# Patient Record
Sex: MALE | Race: WHITE | Employment: OTHER | URBAN - NONMETROPOLITAN AREA
[De-identification: names, ages, dates, MRNs, and addresses within clinical notes are randomized per-mention and may not be internally consistent; named-entity substitution may affect disease eponyms.]

---

## 2017-04-18 ENCOUNTER — OFFICE VISIT (OUTPATIENT)
Dept: FAMILY MEDICINE CLINIC | Age: 82
End: 2017-04-18

## 2017-04-18 VITALS
TEMPERATURE: 98 F | SYSTOLIC BLOOD PRESSURE: 105 MMHG | BODY MASS INDEX: 31.98 KG/M2 | OXYGEN SATURATION: 96 % | WEIGHT: 199 LBS | HEART RATE: 66 BPM | HEIGHT: 66 IN | DIASTOLIC BLOOD PRESSURE: 68 MMHG

## 2017-04-18 DIAGNOSIS — D22.9 SUSPICIOUS NEVUS: ICD-10-CM

## 2017-04-18 DIAGNOSIS — R05.9 COUGH: ICD-10-CM

## 2017-04-18 DIAGNOSIS — J06.9 URI, ACUTE: Primary | ICD-10-CM

## 2017-04-18 PROCEDURE — 99214 OFFICE O/P EST MOD 30 MIN: CPT | Performed by: NURSE PRACTITIONER

## 2017-04-18 RX ORDER — AZITHROMYCIN 250 MG/1
TABLET, FILM COATED ORAL
Qty: 1 PACKET | Refills: 0 | Status: SHIPPED | OUTPATIENT
Start: 2017-04-18 | End: 2017-08-10 | Stop reason: ALTCHOICE

## 2017-04-18 RX ORDER — BENZONATATE 100 MG/1
100 CAPSULE ORAL 3 TIMES DAILY PRN
Qty: 30 CAPSULE | Refills: 0 | Status: SHIPPED | OUTPATIENT
Start: 2017-04-18 | End: 2017-04-25

## 2017-04-18 ASSESSMENT — ENCOUNTER SYMPTOMS
GASTROINTESTINAL NEGATIVE: 1
SHORTNESS OF BREATH: 0
EYES NEGATIVE: 1
SORE THROAT: 1
COUGH: 1

## 2017-08-07 ENCOUNTER — OFFICE VISIT (OUTPATIENT)
Dept: SURGERY | Age: 82
End: 2017-08-07

## 2017-08-07 VITALS
HEIGHT: 65 IN | DIASTOLIC BLOOD PRESSURE: 70 MMHG | WEIGHT: 206 LBS | BODY MASS INDEX: 34.32 KG/M2 | SYSTOLIC BLOOD PRESSURE: 122 MMHG

## 2017-08-07 DIAGNOSIS — D48.5 NEOPLASM OF UNCERTAIN BEHAVIOR OF SKIN: Primary | ICD-10-CM

## 2017-08-07 PROCEDURE — 99213 OFFICE O/P EST LOW 20 MIN: CPT | Performed by: SURGERY

## 2017-08-09 ENCOUNTER — TELEPHONE (OUTPATIENT)
Dept: SURGERY | Age: 82
End: 2017-08-09

## 2017-08-14 ENCOUNTER — HOSPITAL ENCOUNTER (OUTPATIENT)
Dept: SURGERY | Age: 82
Discharge: HOME | End: 2017-08-14
Attending: SURGERY | Admitting: SURGERY

## 2017-08-15 ENCOUNTER — HOSPITAL ENCOUNTER (OUTPATIENT)
Dept: SURGERY | Age: 82
Discharge: HOME | End: 2017-08-15
Admitting: SURGERY

## 2017-08-15 VITALS
DIASTOLIC BLOOD PRESSURE: 69 MMHG | HEART RATE: 60 BPM | TEMPERATURE: 98.8 F | BODY MASS INDEX: 34.32 KG/M2 | WEIGHT: 206 LBS | HEIGHT: 65 IN | OXYGEN SATURATION: 96 % | SYSTOLIC BLOOD PRESSURE: 103 MMHG | RESPIRATION RATE: 16 BRPM

## 2017-08-15 PROCEDURE — 11441 EXC FACE-MM B9+MARG 0.6-1 CM: CPT | Performed by: SURGERY

## 2017-08-15 PROCEDURE — 12052 INTMD RPR FACE/MM 2.6-5.0 CM: CPT | Performed by: SURGERY

## 2017-08-15 ASSESSMENT — PAIN - FUNCTIONAL ASSESSMENT: PAIN_FUNCTIONAL_ASSESSMENT: 0-10

## 2017-08-15 ASSESSMENT — PAIN SCALES - GENERAL
PAINLEVEL_OUTOF10: 0
PAINLEVEL_OUTOF10: 0

## 2017-08-28 ENCOUNTER — TELEPHONE (OUTPATIENT)
Dept: SURGERY | Age: 82
End: 2017-08-28

## 2017-08-28 ENCOUNTER — OFFICE VISIT (OUTPATIENT)
Dept: SURGERY | Age: 82
End: 2017-08-28

## 2017-08-28 VITALS
SYSTOLIC BLOOD PRESSURE: 120 MMHG | DIASTOLIC BLOOD PRESSURE: 80 MMHG | BODY MASS INDEX: 33.99 KG/M2 | WEIGHT: 204 LBS | HEIGHT: 65 IN

## 2017-08-28 DIAGNOSIS — Z09 SURGERY FOLLOW-UP EXAMINATION: Primary | ICD-10-CM

## 2017-08-28 PROCEDURE — 99024 POSTOP FOLLOW-UP VISIT: CPT | Performed by: SURGERY

## 2017-12-18 ENCOUNTER — TELEPHONE (OUTPATIENT)
Dept: FAMILY MEDICINE CLINIC | Age: 82
End: 2017-12-18

## 2017-12-18 NOTE — TELEPHONE ENCOUNTER
Pt called in regards to seeing Dr Stephan Blakely. Patient has a throat infection and can't get over illness. Wife is asking for a call back.

## 2017-12-19 ENCOUNTER — OFFICE VISIT (OUTPATIENT)
Dept: FAMILY MEDICINE CLINIC | Age: 82
End: 2017-12-19

## 2017-12-19 VITALS
SYSTOLIC BLOOD PRESSURE: 100 MMHG | WEIGHT: 203.6 LBS | DIASTOLIC BLOOD PRESSURE: 60 MMHG | OXYGEN SATURATION: 96 % | BODY MASS INDEX: 33.51 KG/M2 | HEART RATE: 72 BPM

## 2017-12-19 DIAGNOSIS — R05.9 COUGH: Primary | ICD-10-CM

## 2017-12-19 DIAGNOSIS — R06.2 WHEEZE: ICD-10-CM

## 2017-12-19 DIAGNOSIS — J40 BRONCHITIS: ICD-10-CM

## 2017-12-19 DIAGNOSIS — J06.9 ACUTE UPPER RESPIRATORY INFECTION: ICD-10-CM

## 2017-12-19 DIAGNOSIS — J02.9 SORE THROAT: ICD-10-CM

## 2017-12-19 LAB — S PYO AG THROAT QL: NORMAL

## 2017-12-19 PROCEDURE — 99214 OFFICE O/P EST MOD 30 MIN: CPT | Performed by: NURSE PRACTITIONER

## 2017-12-19 PROCEDURE — 94640 AIRWAY INHALATION TREATMENT: CPT | Performed by: NURSE PRACTITIONER

## 2017-12-19 PROCEDURE — 87880 STREP A ASSAY W/OPTIC: CPT | Performed by: NURSE PRACTITIONER

## 2017-12-19 RX ORDER — PREDNISONE 10 MG/1
TABLET ORAL
Qty: 30 TABLET | Refills: 0 | Status: SHIPPED | OUTPATIENT
Start: 2017-12-19 | End: 2018-03-16 | Stop reason: ALTCHOICE

## 2017-12-19 RX ORDER — DOXYCYCLINE HYCLATE 100 MG
100 TABLET ORAL 2 TIMES DAILY
Qty: 20 TABLET | Refills: 0 | Status: SHIPPED | OUTPATIENT
Start: 2017-12-19 | End: 2017-12-29

## 2017-12-19 RX ORDER — BENZONATATE 100 MG/1
100 CAPSULE ORAL 3 TIMES DAILY PRN
Qty: 30 CAPSULE | Refills: 0 | Status: SHIPPED | OUTPATIENT
Start: 2017-12-19 | End: 2018-03-16 | Stop reason: ALTCHOICE

## 2017-12-19 RX ORDER — ALBUTEROL SULFATE 2.5 MG/3ML
2.5 SOLUTION RESPIRATORY (INHALATION) ONCE
Status: COMPLETED | OUTPATIENT
Start: 2017-12-19 | End: 2017-12-19

## 2017-12-19 RX ADMIN — ALBUTEROL SULFATE 2.5 MG: 2.5 SOLUTION RESPIRATORY (INHALATION) at 10:04

## 2017-12-19 RX ADMIN — Medication 0.5 MG: at 10:04

## 2017-12-19 ASSESSMENT — ENCOUNTER SYMPTOMS
RESPIRATORY NEGATIVE: 1
GASTROINTESTINAL NEGATIVE: 1
SORE THROAT: 1
VOICE CHANGE: 1
RHINORRHEA: 1

## 2017-12-19 NOTE — PATIENT INSTRUCTIONS
Patient Education        Cough: Care Instructions  Your Care Instructions    A cough is your body's response to something that bothers your throat or airways. Many things can cause a cough. You might cough because of a cold or the flu, bronchitis, or asthma. Smoking, postnasal drip, allergies, and stomach acid that backs up into your throat also can cause coughs. A cough is a symptom, not a disease. Most coughs stop when the cause, such as a cold, goes away. You can take a few steps at home to cough less and feel better. Follow-up care is a key part of your treatment and safety. Be sure to make and go to all appointments, and call your doctor if you are having problems. It's also a good idea to know your test results and keep a list of the medicines you take. How can you care for yourself at home? · Drink lots of water and other fluids. This helps thin the mucus and soothes a dry or sore throat. Honey or lemon juice in hot water or tea may ease a dry cough. · Take cough medicine as directed by your doctor. · Prop up your head on pillows to help you breathe and ease a dry cough. · Try cough drops to soothe a dry or sore throat. Cough drops don't stop a cough. Medicine-flavored cough drops are no better than candy-flavored drops or hard candy. · Do not smoke. Avoid secondhand smoke. If you need help quitting, talk to your doctor about stop-smoking programs and medicines. These can increase your chances of quitting for good. When should you call for help? Call 911 anytime you think you may need emergency care. For example, call if:  ? · You have severe trouble breathing. ?Call your doctor now or seek immediate medical care if:  ? · You cough up blood. ? · You have new or worse trouble breathing. ? · You have a new or higher fever. ? · You have a new rash. ? Watch closely for changes in your health, and be sure to contact your doctor if:  ? · You cough more deeply or more often, especially if you all instructions on the label. · Do not take two or more pain medicines at the same time unless the doctor told you to. Many pain medicines have acetaminophen, which is Tylenol. Too much acetaminophen (Tylenol) can be harmful. · Take an over-the-counter cough medicine that contains dextromethorphan to help quiet a dry, hacking cough so that you can sleep. Avoid cough medicines that have more than one active ingredient. Read and follow all instructions on the label. · Breathe moist air from a humidifier, hot shower, or sink filled with hot water. The heat and moisture will thin mucus so you can cough it out. · Do not smoke. Smoking can make bronchitis worse. If you need help quitting, talk to your doctor about stop-smoking programs and medicines. These can increase your chances of quitting for good. When should you call for help? Call 911 anytime you think you may need emergency care. For example, call if:  ? · You have severe trouble breathing. ?Call your doctor now or seek immediate medical care if:  ? · You have new or worse trouble breathing. ? · You cough up dark brown or bloody mucus (sputum). ? · You have a new or higher fever. ? · You have a new rash. ? Watch closely for changes in your health, and be sure to contact your doctor if:  ? · You cough more deeply or more often, especially if you notice more mucus or a change in the color of your mucus. ? · You are not getting better as expected. Where can you learn more? Go to https://AkohavirginieJetpac.EC[employer]-GEN Energy. org and sign in to your [employer] account. Enter H333 in the [facility] box to learn more about \"Bronchitis: Care Instructions. \"     If you do not have an account, please click on the \"Sign Up Now\" link. Current as of: May 12, 2017  Content Version: 11.4  © 8674-8659 Healthwise, Incorporated. Care instructions adapted under license by [facility] [facility] ([facility]).  If you have questions about a medical condition or this instruction, always ask your healthcare professional. [place] [address] any warranty or liability for your use of this information.

## 2017-12-19 NOTE — PROGRESS NOTES
[address] [facility] PHYSICIAN PRACTICES  [place] [address]  Dept: [phone]  Dept Fax: [phone]    Sommer Mcrcay is a 80 y.o. male who presents today for his medical conditions/complaints as noted below. Sommer Mccray is c/o of Pharyngitis (Stated about a week ago, no trouble swollowing); Chest Congestion (Started a week ago yesterday and has tried to take Mucinex, no fever); and Cough (that does not seem to be productive)        HPI:     Chief Complaint   Patient presents with    Pharyngitis     Stated about a week ago, no trouble swollowing    Chest Congestion     Started a week ago yesterday and has tried to take Mucinex, no fever    Cough     that does not seem to be productive       HPI    Upper Respiratory Infection  Patient complains of symptoms of a URI. Symptoms include bilateral ear pain, congestion, cough and sore throat. Onset of symptoms was 9 days ago, gradually worsening since that time. He also c/o congestion, cough described as non-productive, without wheezing, dyspnea or hemoptysis, nasal congestion, non productive cough and post nasal drip for the past 6 days . He is drinking moderate amounts of fluids. Evaluation to date: none. Treatment to date: Mucinex, which has been  not very effective.     Past Medical History:   Diagnosis Date    Arthritis     Bradycardia     OA (osteoarthritis)     PE (pulmonary embolism) 1991    Slow heart rate 2011    had pm    Wears glasses       Past Surgical History:   Procedure Laterality Date    CARDIAC SURGERY  09/20/2011    pacemaker, defib    CARPAL TUNNEL RELEASE  1/10/12    Right    CYST REMOVAL      OFF BACK    EYE SURGERY      bilateral cataracts    OTHER SURGICAL HISTORY Left 12/20/2016    excision of skin cancer of hand    OTHER SURGICAL HISTORY Left 08/15/2017    excision of lesions times 2, left ear    SKIN BIOPSY  5/17/2012    excision lesion behind the right ear       Family History field and the left lower field. He has no rales. He exhibits no tenderness. Abdominal: Soft. Bowel sounds are normal. He exhibits no distension and no mass. There is no tenderness. There is no rebound and no guarding. Musculoskeletal: Normal range of motion. He exhibits no edema, tenderness or deformity. Lymphadenopathy:     He has no cervical adenopathy. Neurological: He is alert and oriented to person, place, and time. Skin: Skin is warm and dry. No rash noted. He is not diaphoretic. Psychiatric: He has a normal mood and affect. His behavior is normal. Judgment and thought content normal.       Orders Only on 05/11/2015   Component Date Value Ref Range Status    Sodium 05/11/2015 142  136 - 145 mmol/L Final    Potassium 05/11/2015 5.1  3.5 - 5.1 mmol/L Final    Chloride 05/11/2015 104  99 - 110 mmol/L Final    CO2 05/11/2015 23  21 - 32 mmol/L Final    Anion Gap 05/11/2015 15  3 - 16 Final    Glucose 05/11/2015 111* 70 - 99 mg/dL Final    BUN 05/11/2015 18  7 - 20 mg/dL Final    CREATININE 05/11/2015 1.0  0.8 - 1.3 mg/dL Final    GFR Non- 05/11/2015 >60  >60 Final    Comment: >60 mL/min/1.73m2 EGFR, calc. for ages 25 and older using theMDRD formula (not corrected for   weight), is valid for stablerenal function.  GFR  05/11/2015 >60  >60 Final    Comment: Chronic Kidney Disease: less than 60 ml/min/1.73 sq. m. Kidney Failure: less than 15   ml/min/1.73 sq. m. Results valid for patients 18 years and older.  Calcium 05/11/2015 9.3  8.3 - 10.6 mg/dL Final           Assessment & Plan: The following diagnoses and conditions are stable with no further orders unless indicated:  1. Cough    2. Sore throat    3. Acute upper respiratory infection    4. Bronchitis    5. Wheeze        Annekrissy Ace was seen today for pharyngitis, chest congestion and cough. Will place Mr. Ma on doxycycline and prednisone for URI and bronchitis. Strep negative.   Tessalon peales

## 2018-01-26 ENCOUNTER — OFFICE VISIT (OUTPATIENT)
Dept: FAMILY MEDICINE CLINIC | Age: 83
End: 2018-01-26

## 2018-01-26 VITALS
BODY MASS INDEX: 33.88 KG/M2 | SYSTOLIC BLOOD PRESSURE: 118 MMHG | OXYGEN SATURATION: 95 % | HEART RATE: 74 BPM | WEIGHT: 205.8 LBS | DIASTOLIC BLOOD PRESSURE: 70 MMHG

## 2018-01-26 DIAGNOSIS — J40 BRONCHITIS: ICD-10-CM

## 2018-01-26 DIAGNOSIS — R06.2 WHEEZING: ICD-10-CM

## 2018-01-26 DIAGNOSIS — R68.89 FLU-LIKE SYMPTOMS: ICD-10-CM

## 2018-01-26 DIAGNOSIS — R06.02 SHORTNESS OF BREATH: Primary | ICD-10-CM

## 2018-01-26 LAB
INFLUENZA A ANTIBODY: NEGATIVE
INFLUENZA B ANTIBODY: NEGATIVE

## 2018-01-26 PROCEDURE — 87804 INFLUENZA ASSAY W/OPTIC: CPT | Performed by: NURSE PRACTITIONER

## 2018-01-26 PROCEDURE — 99213 OFFICE O/P EST LOW 20 MIN: CPT | Performed by: NURSE PRACTITIONER

## 2018-01-26 PROCEDURE — 94640 AIRWAY INHALATION TREATMENT: CPT | Performed by: NURSE PRACTITIONER

## 2018-01-26 RX ORDER — METHYLPREDNISOLONE 4 MG/1
TABLET ORAL
Qty: 1 KIT | Refills: 0 | Status: SHIPPED | OUTPATIENT
Start: 2018-01-26 | End: 2018-02-01

## 2018-01-26 RX ORDER — ALBUTEROL SULFATE 90 UG/1
2 AEROSOL, METERED RESPIRATORY (INHALATION) EVERY 6 HOURS PRN
Qty: 1 INHALER | Refills: 0 | Status: SHIPPED | OUTPATIENT
Start: 2018-01-26

## 2018-01-26 RX ORDER — ALBUTEROL SULFATE 2.5 MG/3ML
2.5 SOLUTION RESPIRATORY (INHALATION) ONCE
Status: COMPLETED | OUTPATIENT
Start: 2018-01-26 | End: 2018-01-26

## 2018-01-26 RX ORDER — DOXYCYCLINE HYCLATE 100 MG/1
100 CAPSULE ORAL 2 TIMES DAILY
Qty: 20 CAPSULE | Refills: 0 | Status: SHIPPED | OUTPATIENT
Start: 2018-01-26 | End: 2018-02-05

## 2018-01-26 RX ORDER — BUDESONIDE 0.5 MG/2ML
0.5 INHALANT ORAL ONCE
Status: COMPLETED | OUTPATIENT
Start: 2018-01-26 | End: 2018-01-26

## 2018-01-26 RX ADMIN — ALBUTEROL SULFATE 2.5 MG: 2.5 SOLUTION RESPIRATORY (INHALATION) at 12:01

## 2018-01-26 RX ADMIN — BUDESONIDE 500 MCG: 0.5 INHALANT ORAL at 12:01

## 2018-01-26 ASSESSMENT — ENCOUNTER SYMPTOMS
ABDOMINAL PAIN: 0
SHORTNESS OF BREATH: 1
SORE THROAT: 0
RHINORRHEA: 1
NAUSEA: 0
GASTROINTESTINAL NEGATIVE: 1
DIARRHEA: 0
WHEEZING: 1
SPUTUM PRODUCTION: 0
COUGH: 1
SWOLLEN GLANDS: 0
VOMITING: 0
EYES NEGATIVE: 1
SINUS PAIN: 0
HEMOPTYSIS: 0

## 2018-01-26 NOTE — PATIENT INSTRUCTIONS
Patient Education        Cough: Care Instructions  Your Care Instructions    A cough is your body's response to something that bothers your throat or airways. Many things can cause a cough. You might cough because of a cold or the flu, bronchitis, or asthma. Smoking, postnasal drip, allergies, and stomach acid that backs up into your throat also can cause coughs. A cough is a symptom, not a disease. Most coughs stop when the cause, such as a cold, goes away. You can take a few steps at home to cough less and feel better. Follow-up care is a key part of your treatment and safety. Be sure to make and go to all appointments, and call your doctor if you are having problems. It's also a good idea to know your test results and keep a list of the medicines you take. How can you care for yourself at home? · Drink lots of water and other fluids. This helps thin the mucus and soothes a dry or sore throat. Honey or lemon juice in hot water or tea may ease a dry cough. · Take cough medicine as directed by your doctor. · Prop up your head on pillows to help you breathe and ease a dry cough. · Try cough drops to soothe a dry or sore throat. Cough drops don't stop a cough. Medicine-flavored cough drops are no better than candy-flavored drops or hard candy. · Do not smoke. Avoid secondhand smoke. If you need help quitting, talk to your doctor about stop-smoking programs and medicines. These can increase your chances of quitting for good. When should you call for help? Call 911 anytime you think you may need emergency care. For example, call if:  ? · You have severe trouble breathing. ?Call your doctor now or seek immediate medical care if:  ? · You cough up blood. ? · You have new or worse trouble breathing. ? · You have a new or higher fever. ? · You have a new rash. ? Watch closely for changes in your health, and be sure to contact your doctor if:  ? · You cough more deeply or more often, especially if you notice more mucus or a change in the color of your mucus. ? · You have new symptoms, such as a sore throat, an earache, or sinus pain. ? · You do not get better as expected. Where can you learn more? Go to https://chpeloboeweb.[employer].. org and sign in to your [employer] account. Enter D279 in the [employer] box to learn more about \"Cough: Care Instructions. \"     If you do not have an account, please click on the \"Sign Up Now\" link. Current as of: May 12, 2017  Content Version: 11.5  © 0359-7574 Healthwise, Incorporated. Care instructions adapted under license by [facility] ([facility]). If you have questions about a medical condition or this instruction, always ask your healthcare professional. [address] any warranty or liability for your use of this information.

## 2018-01-26 NOTE — PROGRESS NOTES
Cancer Father      Past Surgical History:   Procedure Laterality Date    CARDIAC SURGERY  09/20/2011    pacemaker, defib    CARPAL TUNNEL RELEASE  1/10/12    Right    CYST REMOVAL      OFF BACK    EYE SURGERY      bilateral cataracts    OTHER SURGICAL HISTORY Left 12/20/2016    excision of skin cancer of hand    OTHER SURGICAL HISTORY Left 08/15/2017    excision of lesions times 2, left ear    SKIN BIOPSY  5/17/2012    excision lesion behind the right ear     Social History     Social History    Marital status:      Spouse name: N/A    Number of children: N/A    Years of education: N/A     Occupational History    Not on file. Social History Main Topics    Smoking status: Never Smoker    Smokeless tobacco: Never Used    Alcohol use No    Drug use: No    Sexual activity: Not on file     Other Topics Concern    Not on file     Social History Narrative    No narrative on file     Current Outpatient Prescriptions   Medication Sig Dispense Refill    predniSONE (DELTASONE) 10 MG tablet Take 40 mg for 3 days then 30 mg for 3 days then 20 mg for 3 days then 10 mg for 3 days 30 tablet 0    benzonatate (TESSALON PERLES) 100 MG capsule Take 1 capsule by mouth 3 times daily as needed for Cough 30 capsule 0    diphenhydrAMINE-APAP, sleep, (TYLENOL PM EXTRA STRENGTH)  MG tablet Take 1 tablet by mouth nightly as needed for Sleep      spironolactone (ALDACTONE) 25 MG tablet Take 25 mg by mouth daily      carvedilol (COREG) 12.5 MG tablet Take 12.5 mg by mouth 2 times daily (with meals)      losartan (COZAAR) 25 MG tablet Take 25 mg by mouth daily      aspirin 81 MG tablet Take 81 mg by mouth daily.  therapeutic multivitamin-minerals (THERAGRAN-M) tablet Take 1 tablet by mouth daily. No current facility-administered medications for this visit. Objective:       Physical Exam   Constitutional: He is oriented to person, place, and time.  Vital signs are normal. He appears well-developed and well-nourished. He is cooperative. Non-toxic appearance. He does not have a sickly appearance. No distress. HENT:   Head: Normocephalic and atraumatic. Right Ear: Hearing, tympanic membrane and ear canal normal.   Left Ear: Hearing, tympanic membrane and ear canal normal.   Nose: Nose normal.   Mouth/Throat: Uvula is midline, oropharynx is clear and moist and mucous membranes are normal.   Eyes: Conjunctivae and lids are normal.   Neck: Neck supple. Cardiovascular: Normal rate, regular rhythm, normal heart sounds and normal pulses. Pulmonary/Chest: Effort normal. No accessory muscle usage. No respiratory distress. He has wheezes. Abdominal: Soft. Normal appearance. There is no tenderness. Lymphadenopathy:        Head (right side): No submental and no submandibular adenopathy present. Head (left side): No submental and no submandibular adenopathy present. He has no cervical adenopathy. Neurological: He is alert and oriented to person, place, and time. Skin: Skin is warm and dry. No lesion and no rash noted. Psychiatric: He has a normal mood and affect. His speech is normal and behavior is normal. Cognition and memory are normal.       /70 (Site: Left Arm, Position: Sitting, Cuff Size: Large Adult)   Pulse 74   Wt 205 lb 12.8 oz (93.4 kg)   SpO2 95%   BMI 33.88 kg/m²   Body mass index is 33.88 kg/m². BP Readings from Last 2 Encounters:   01/26/18 118/70   12/19/17 100/60       Wt Readings from Last 3 Encounters:   01/26/18 205 lb 12.8 oz (93.4 kg)   12/19/17 203 lb 9.6 oz (92.4 kg)   08/28/17 204 lb (92.5 kg)       Lab Review   Office Visit on 12/19/2017   Component Date Value    Strep A Ag 12/19/2017 None Detected        No results found for this visit on 01/26/18. Assessment:       1. Shortness of breath    2. Wheezing    3. Bronchitis    4.  Flu-like symptoms        Results for POC orders placed in visit on 01/26/18   POCT Influenza A/B   Result Value Ref Range    Influenza A Ab negative     Influenza B Ab negative             Plan:       Jerome Hernandez was seen today for cough and chest congestion. Diagnoses and all orders for this visit:    Shortness of breath  -     albuterol (PROVENTIL) nebulizer solution 2.5 mg; Take 3 mLs by nebulization once  -     budesonide (PULMICORT) nebulizer suspension 500 mcg; Take 2 mLs by nebulization once  -     AK PRESSURIZED/NONPRESSURIZED INHALATION TREATMENT  -     albuterol sulfate HFA (VENTOLIN HFA) 108 (90 Base) MCG/ACT inhaler; Inhale 2 puffs into the lungs every 6 hours as needed for Wheezing  Nebulizer Treatment:   Nebulizer treatment given in office today using medications noted above. Breathing treatment performed for decreased air flow and/or mild diffuse wheezes. Patient tolerated procedure well. Lung auscultation post treatment revealed improvement in air flow, diminished or resolved wheezes, and subjective improvement. Patients pulse ox before treatment 95 %  Patients pulse ox after treatment 97 %     Wheezing  -     albuterol (PROVENTIL) nebulizer solution 2.5 mg; Take 3 mLs by nebulization once  -     budesonide (PULMICORT) nebulizer suspension 500 mcg; Take 2 mLs by nebulization once  -     AK PRESSURIZED/NONPRESSURIZED INHALATION TREATMENT  -     methylPREDNISolone (MEDROL, ARLYN,) 4 MG tablet; Take as directed for 6 days. -     albuterol sulfate HFA (VENTOLIN HFA) 108 (90 Base) MCG/ACT inhaler; Inhale 2 puffs into the lungs every 6 hours as needed for Wheezing    Bronchitis  -     doxycycline hyclate (VIBRAMYCIN) 100 MG capsule; Take 1 capsule by mouth 2 times daily for 10 days    Flu-like symptoms  -     POCT Influenza A/B          Patient has been instructed call the office immediately with new symptoms, change in symptoms or worsening of symptoms. If this is not feasible, patient is instructed to report to the emergency room. Medication profile reviewed.  Medication side effects and possible impairments from medications were discussed as applicable. Allergies were reviewed. Health maintenance was reviewed and updated as appropriate.

## 2018-03-16 ENCOUNTER — OFFICE VISIT (OUTPATIENT)
Dept: FAMILY MEDICINE CLINIC | Age: 83
End: 2018-03-16

## 2018-03-16 VITALS
DIASTOLIC BLOOD PRESSURE: 72 MMHG | OXYGEN SATURATION: 96 % | TEMPERATURE: 97.7 F | HEIGHT: 65 IN | WEIGHT: 207.6 LBS | SYSTOLIC BLOOD PRESSURE: 116 MMHG | HEART RATE: 66 BPM | BODY MASS INDEX: 34.59 KG/M2

## 2018-03-16 DIAGNOSIS — K12.0 CANKER SORES ORAL: Primary | ICD-10-CM

## 2018-03-16 PROCEDURE — 99213 OFFICE O/P EST LOW 20 MIN: CPT | Performed by: NURSE PRACTITIONER

## 2018-03-16 ASSESSMENT — PATIENT HEALTH QUESTIONNAIRE - PHQ9
2. FEELING DOWN, DEPRESSED OR HOPELESS: 0
SUM OF ALL RESPONSES TO PHQ QUESTIONS 1-9: 0
1. LITTLE INTEREST OR PLEASURE IN DOING THINGS: 0
SUM OF ALL RESPONSES TO PHQ9 QUESTIONS 1 & 2: 0

## 2018-03-16 NOTE — PATIENT INSTRUCTIONS
citrus fruits, nuts, seeds, and tomatoes. · To soothe your canker sore and help it heal:  ¨ Use an over-the-counter numbing medicine, such as Orabase or Anbesol. ¨ Dab a bit of Milk of Magnesia on the canker sore 3 or 4 times a day. · Put ice on your sore to reduce the pain. · Take anti-inflammatory medicines to reduce pain, as needed. These include ibuprofen (Advil, Motrin) and naproxen (Aleve). Read and follow all instructions on the label. · Use a soft-bristle toothbrush, and brush your teeth well but carefully. · Do not smoke or use spit tobacco. Tobacco can cause mouth problems and slow healing. If you need help quitting, talk to your doctor about stop-smoking programs and medicines. These can increase your chances of quitting for good. When should you call for help? Call your doctor now or seek immediate medical care if:  ? · You have signs of infection, such as:  ¨ Increased pain, swelling, warmth, or redness. ¨ Red streaks leading from the area. ¨ Pus draining from the area. ¨ A fever. ? Watch closely for changes in your health, and be sure to contact your doctor if:  ? · You do not get better as expected. Where can you learn more? Go to https://GigaBryte.Guavas. org and sign in to your [employer] account. Enter C962 in the [facility] box to learn more about \"Canker Sore: Care Instructions. \"     If you do not have an account, please click on the \"Sign Up Now\" link. Current as of: May 12, 2017  Content Version: 11.5  © 8882-6812 Healthwise, [email or web address]. Care instructions adapted under license by [facility] ([facility]). If you have questions about a medical condition or this instruction, always ask your healthcare professional. [address] any warranty or liability for your use of this information.

## 2018-03-19 ASSESSMENT — ENCOUNTER SYMPTOMS
SINUS PAIN: 0
VOICE CHANGE: 0
RESPIRATORY NEGATIVE: 1
TROUBLE SWALLOWING: 0
SINUS PRESSURE: 0
SORE THROAT: 0
RHINORRHEA: 0
FACIAL SWELLING: 0

## 2018-03-19 NOTE — PROGRESS NOTES
[facility]YSICIAN PRACTICES  [facility] FAMILY [place] [address]  Dept: [phone]  Dept Fax: [phone]    Abel Clement is a 80 y.o. male who presents today for his medical conditions/complaints as noted below. Abel Clement is c/o of Mouth Lesions (He went to dentist 1 week ago for dental filling. He has felt a rough spot where the numbing medication was at. It is sore and his lower lip is swollen. )        HPI:     Chief Complaint   Patient presents with    Mouth Lesions     He went to dentist 1 week ago for dental filling. He has felt a rough spot where the numbing medication was at. It is sore and his lower lip is swollen. HPI    Patient presents to the office today for a mouth sore that he has had for about one week. He states he went to the dentist for a dental filling one week ago and noticed the same day that he developed a sore the bottom of his left leg. This sore is at the right lower side of his lip and is causing pain, swelling, tenderness. He states that the sore was improving over the last week and now it has not improved over the last 2 days. He has not used anything to help with the pain.     Past Medical History:   Diagnosis Date    Arthritis     Bradycardia     OA (osteoarthritis)     PE (pulmonary embolism) 1991    Slow heart rate 2011    had pm    Wears glasses       Past Surgical History:   Procedure Laterality Date    CARDIAC SURGERY  09/20/2011    pacemaker, defib    CARPAL TUNNEL RELEASE  1/10/12    Right    CYST REMOVAL      OFF BACK    EYE SURGERY      bilateral cataracts    OTHER SURGICAL HISTORY Left 12/20/2016    excision of skin cancer of hand    OTHER SURGICAL HISTORY Left 08/15/2017    excision of lesions times 2, left ear    SKIN BIOPSY  5/17/2012    excision lesion behind the right ear       Family History   Problem Relation Age of Onset    Arthritis Father     Cancer Father        Social History   Substance Use Topics

## 2018-07-05 ENCOUNTER — TELEPHONE (OUTPATIENT)
Dept: FAMILY MEDICINE CLINIC | Age: 83
End: 2018-07-05

## 2018-07-05 NOTE — TELEPHONE ENCOUNTER
Attempted to contact patient on 7/5/2018. Result: left message on the patient's voicemail asking patient to return my call. Pre-Visit planning not completed.

## 2018-07-09 RX ORDER — ATORVASTATIN CALCIUM 10 MG/1
TABLET, FILM COATED ORAL
COMMUNITY
Start: 2018-05-16

## 2018-07-09 NOTE — TELEPHONE ENCOUNTER
Dr. Dena Simms:    Notes: This patient has an upcoming appointment with you for a Routine Physical.     In planning for that visit I have completed the following pre-visit planning:     Pre-Visit Planning Checklist:  Patient contacted: yes  Verified patient by name and date of birth: yes    Health Maintenance items reviewed:    No pre-visit planning health maintenance topics to review at this time    Labs and procedures pended: Fasting Lipid Panel  and CMP   Labs and procedures discussed with patient: yes  Reminded patient to check with their insurance company about coverage for lab tests and lab location: no    Preliminary Medication Reconciliation: was performed. Reminded patient to arrive early: yes    Please complete the med-reconciliation and sign the appropriate labs as soon as possible.       Eryn Sheets, [address]  Pre-Services Specialist

## 2018-07-16 ENCOUNTER — OFFICE VISIT (OUTPATIENT)
Dept: FAMILY MEDICINE CLINIC | Age: 83
End: 2018-07-16

## 2018-07-16 VITALS
SYSTOLIC BLOOD PRESSURE: 118 MMHG | BODY MASS INDEX: 34.25 KG/M2 | DIASTOLIC BLOOD PRESSURE: 68 MMHG | WEIGHT: 205.8 LBS | OXYGEN SATURATION: 96 % | HEART RATE: 67 BPM

## 2018-07-16 DIAGNOSIS — I25.83 CORONARY ARTERY DISEASE DUE TO LIPID RICH PLAQUE: ICD-10-CM

## 2018-07-16 DIAGNOSIS — Z23 NEED FOR SHINGLES VACCINE: ICD-10-CM

## 2018-07-16 DIAGNOSIS — E78.2 MIXED HYPERLIPIDEMIA: ICD-10-CM

## 2018-07-16 DIAGNOSIS — I25.5 ISCHEMIC CARDIOMYOPATHY: ICD-10-CM

## 2018-07-16 DIAGNOSIS — M51.36 LUMBAR DEGENERATIVE DISC DISEASE: ICD-10-CM

## 2018-07-16 DIAGNOSIS — M47.812 CERVICAL SPINE ARTHRITIS: ICD-10-CM

## 2018-07-16 DIAGNOSIS — Z00.00 ANNUAL PHYSICAL EXAM: Primary | ICD-10-CM

## 2018-07-16 DIAGNOSIS — I25.10 CORONARY ARTERY DISEASE DUE TO LIPID RICH PLAQUE: ICD-10-CM

## 2018-07-16 PROCEDURE — 99397 PER PM REEVAL EST PAT 65+ YR: CPT | Performed by: FAMILY MEDICINE

## 2018-07-16 NOTE — PATIENT INSTRUCTIONS
Please return to our office for fasting labwork. You are being asked to return for fasting labs. Our definition of fasting is nothing to eat for 12 hours prior to the lab draw. We encourage you to drink all the black coffee and/or water that you can. This allows your veins to be prominent, making it easier for the phlebotomist to draw your blood.

## 2018-07-16 NOTE — PROGRESS NOTES
History and Physical      Ericka Lucero  YOB: 1932    Date of Service:  7/16/2018    Chief Complaint:  Ericka Lucero is a 80 y.o. male who presents for complete physical examination. He has the MDI for bronchitis last winter but hasn't needed it recently. He sees Dr Camille Ordonez, he states his EF was low and now has a defibrillator. He states he does have a low back pain, he uses lidocaine patches OTC and Icy Hot and Tylenol Arthritis, advised him due to heart issues he should not use NSAID's. HPI: Annual exam    Patient now has a defibrillator pacemaker. Ejection fraction has ran around 35. He does find he wears out easily with activity. Low back is hurting considerable no radiation down the legs no bowel or bladder issues. Takes Tylenol no NSAIDs.     Wt Readings from Last 3 Encounters:   07/16/18 205 lb 12.8 oz (93.4 kg)   03/16/18 207 lb 9.6 oz (94.2 kg)   01/26/18 205 lb 12.8 oz (93.4 kg)     BP Readings from Last 3 Encounters:   07/16/18 118/68   03/16/18 116/72   01/26/18 118/70       Patient Active Problem List   Diagnosis    Arthritis    Pulmonary embolism ([address]    Bradycardia    Wears glasses    Slow heart rate    OA (osteoarthritis)    Cervical spine arthritis ([address]    Squamous cell skin cancer    Neoplasm of uncertain behavior of skin       Preventive Care:  Health Maintenance   Topic Date Due    Shingles Vaccine (1 of 2 - 2 Dose Series) 08/12/1982    Potassium monitoring  05/11/2016    Creatinine monitoring  05/11/2016    Flu vaccine (1) 09/01/2018    Pneumococcal low/med risk (2 of 2 - PPSV23) 10/10/2018    DTaP/Tdap/Td vaccine (2 - Td) 01/30/2023      Self-testicular exams: No  Sexual activity: Discussed   Last eye exam: Uncertain  Exercise: no regular exercise  Seatbelt use: Yes  Lipid panel:  No results found for: CHOL, TRIG, HDL, LDLCALC, LDLDIRECT    Living will: Uncertain    Immunization History   Administered Date(s) Administered    Influenza Virus Vaccine are normal. There is no organomegaly, mass or bruit. Genitourinary:  Not examine. Musculoskeletal: Normal range of motion, no synovitis. He exhibits 1-2+ LE bilateral edema. Neurological: He is alert and oriented to person, place, and time. He has normal reflexes. No cranial nerve deficit. Coordination normal.   Skin: Skin is warm, dry and intact. No suspicious lesions are noted. Psychiatric: He has a normal mood and affect. His speech is normal and behavior is normal. Judgment, cognition and memory are normal.     Assessment/Plan:     Diagnosis Orders   1. Annual physical exam     2. Need for shingles vaccine  zoster recombinant adjuvanted vaccine (SHINGRIX) 50 MCG SUSR injection   3. Lumbar degenerative disc disease     4. Coronary artery disease due to lipid rich plaque  Lipid, Fasting    COMPREHENSIVE METABOLIC PANEL   5. Ischemic cardiomyopathy  Lipid, Fasting    COMPREHENSIVE METABOLIC PANEL   6. Mixed hyperlipidemia  Lipid, Fasting    COMPREHENSIVE METABOLIC PANEL   7. Cervical spine arthritis ([facility] [address]     Patient will except a shingles vaccine. No symptoms of coronary insufficiency. No overt findings of CHF. He will continue treatment with 4% lidocaine topical and Tylenol for his low back, does not feel he needs to see a specialist at this time. Lipids will be updated. Arthritis of the neck as well. Follow-up in a year, sooner as needed. Patient should call the office immediately with new or ongoing signs or symptoms or worsening, or proceed to the emergency room. No changes in past medical history, past surgical history, social history, or family history were noted during the patient encounter unless specifically listed above. All updates of past medical history, past surgical history, social history, or family history were reviewed personally by me during the office visit. All problems listed in the assessment are stable unless noted otherwise.   Medication profile reviewed personally by me

## 2018-07-18 ENCOUNTER — NURSE ONLY (OUTPATIENT)
Dept: FAMILY MEDICINE CLINIC | Age: 83
End: 2018-07-18

## 2018-07-18 DIAGNOSIS — E78.2 MIXED HYPERLIPIDEMIA: ICD-10-CM

## 2018-07-18 DIAGNOSIS — I25.83 CORONARY ARTERY DISEASE DUE TO LIPID RICH PLAQUE: ICD-10-CM

## 2018-07-18 DIAGNOSIS — I25.10 CORONARY ARTERY DISEASE DUE TO LIPID RICH PLAQUE: ICD-10-CM

## 2018-07-18 DIAGNOSIS — I25.5 ISCHEMIC CARDIOMYOPATHY: ICD-10-CM

## 2018-07-18 LAB
A/G RATIO: 1.7 (ref 1.1–2.2)
ALBUMIN SERPL-MCNC: 4.1 G/DL (ref 3.4–5)
ALP BLD-CCNC: 76 U/L (ref 40–129)
ALT SERPL-CCNC: 16 U/L (ref 10–40)
ANION GAP SERPL CALCULATED.3IONS-SCNC: 12 MMOL/L (ref 3–16)
AST SERPL-CCNC: 21 U/L (ref 15–37)
BILIRUB SERPL-MCNC: 0.8 MG/DL (ref 0–1)
BUN BLDV-MCNC: 25 MG/DL (ref 7–20)
CALCIUM SERPL-MCNC: 9.2 MG/DL (ref 8.3–10.6)
CHLORIDE BLD-SCNC: 103 MMOL/L (ref 99–110)
CHOLESTEROL, FASTING: 85 MG/DL (ref 0–199)
CO2: 25 MMOL/L (ref 21–32)
CREAT SERPL-MCNC: 1.4 MG/DL (ref 0.8–1.3)
GFR AFRICAN AMERICAN: 58
GFR NON-AFRICAN AMERICAN: 48
GLOBULIN: 2.4 G/DL
GLUCOSE BLD-MCNC: 136 MG/DL (ref 70–99)
HDLC SERPL-MCNC: 33 MG/DL (ref 40–60)
LDL CHOLESTEROL CALCULATED: 34 MG/DL
POTASSIUM SERPL-SCNC: 5.4 MMOL/L (ref 3.5–5.1)
SODIUM BLD-SCNC: 140 MMOL/L (ref 136–145)
TOTAL PROTEIN: 6.5 G/DL (ref 6.4–8.2)
TRIGLYCERIDE, FASTING: 88 MG/DL (ref 0–150)
VLDLC SERPL CALC-MCNC: 18 MG/DL

## 2018-07-18 PROCEDURE — 36415 COLL VENOUS BLD VENIPUNCTURE: CPT | Performed by: FAMILY MEDICINE

## 2018-07-20 DIAGNOSIS — R94.4 KIDNEY FUNCTION TEST ABNORMAL: Primary | ICD-10-CM

## 2018-07-20 DIAGNOSIS — E87.5 SERUM POTASSIUM ELEVATED: ICD-10-CM

## 2018-07-21 LAB
ANION GAP SERPL CALCULATED.3IONS-SCNC: 13 MMOL/L (ref 3–16)
BUN BLDV-MCNC: 23 MG/DL (ref 7–20)
CALCIUM SERPL-MCNC: 8.8 MG/DL (ref 8.3–10.6)
CHLORIDE BLD-SCNC: 104 MMOL/L (ref 99–110)
CO2: 23 MMOL/L (ref 21–32)
CREAT SERPL-MCNC: 1.3 MG/DL (ref 0.8–1.3)
GFR AFRICAN AMERICAN: >60
GFR NON-AFRICAN AMERICAN: 52
GLUCOSE BLD-MCNC: 186 MG/DL (ref 70–99)
POTASSIUM SERPL-SCNC: 4.8 MMOL/L (ref 3.5–5.1)
SODIUM BLD-SCNC: 140 MMOL/L (ref 136–145)

## 2018-07-23 DIAGNOSIS — R73.9 HYPERGLYCEMIA: Primary | ICD-10-CM

## 2018-07-23 DIAGNOSIS — Z13.0 SCREENING FOR DEFICIENCY ANEMIA: ICD-10-CM

## 2018-07-25 ENCOUNTER — NURSE ONLY (OUTPATIENT)
Dept: FAMILY MEDICINE CLINIC | Age: 83
End: 2018-07-25

## 2018-07-25 DIAGNOSIS — Z13.0 SCREENING FOR DEFICIENCY ANEMIA: ICD-10-CM

## 2018-07-25 DIAGNOSIS — R73.9 HYPERGLYCEMIA: ICD-10-CM

## 2018-07-25 LAB
BASOPHILS ABSOLUTE: 0.1 K/UL (ref 0–0.2)
BASOPHILS RELATIVE PERCENT: 0.8 %
EOSINOPHILS ABSOLUTE: 0.5 K/UL (ref 0–0.6)
EOSINOPHILS RELATIVE PERCENT: 6 %
HCT VFR BLD CALC: 37.7 % (ref 40.5–52.5)
HEMOGLOBIN: 12.9 G/DL (ref 13.5–17.5)
LYMPHOCYTES ABSOLUTE: 2.8 K/UL (ref 1–5.1)
LYMPHOCYTES RELATIVE PERCENT: 33.4 %
MCH RBC QN AUTO: 33.6 PG (ref 26–34)
MCHC RBC AUTO-ENTMCNC: 34.3 G/DL (ref 31–36)
MCV RBC AUTO: 97.9 FL (ref 80–100)
MONOCYTES ABSOLUTE: 1.1 K/UL (ref 0–1.3)
MONOCYTES RELATIVE PERCENT: 13.4 %
NEUTROPHILS ABSOLUTE: 3.9 K/UL (ref 1.7–7.7)
NEUTROPHILS RELATIVE PERCENT: 46.4 %
PDW BLD-RTO: 13 % (ref 12.4–15.4)
PLATELET # BLD: 205 K/UL (ref 135–450)
PMV BLD AUTO: 8.1 FL (ref 5–10.5)
RBC # BLD: 3.85 M/UL (ref 4.2–5.9)
WBC # BLD: 8.3 K/UL (ref 4–11)

## 2018-07-25 PROCEDURE — 36415 COLL VENOUS BLD VENIPUNCTURE: CPT | Performed by: FAMILY MEDICINE

## 2018-07-26 LAB
ESTIMATED AVERAGE GLUCOSE: 177.2 MG/DL
HBA1C MFR BLD: 7.8 %

## 2018-07-28 DIAGNOSIS — R73.9 HYPERGLYCEMIA: Primary | ICD-10-CM

## 2018-08-17 ENCOUNTER — TELEPHONE (OUTPATIENT)
Dept: FAMILY MEDICINE CLINIC | Age: 83
End: 2018-08-17

## 2018-08-20 RX ORDER — METFORMIN HYDROCHLORIDE 500 MG/1
TABLET, EXTENDED RELEASE ORAL
Qty: 180 TABLET | Refills: 3 | Status: SHIPPED | OUTPATIENT
Start: 2018-08-20 | End: 2019-08-19 | Stop reason: SDUPTHER

## 2019-06-13 ENCOUNTER — NURSE ONLY (OUTPATIENT)
Dept: FAMILY MEDICINE CLINIC | Age: 84
End: 2019-06-13

## 2019-08-19 RX ORDER — METFORMIN HYDROCHLORIDE 500 MG/1
TABLET, EXTENDED RELEASE ORAL
Qty: 180 TABLET | Refills: 3 | Status: SHIPPED | OUTPATIENT
Start: 2019-08-19 | End: 2020-12-24

## 2020-01-21 ENCOUNTER — OFFICE VISIT (OUTPATIENT)
Dept: ORTHOPEDIC SURGERY | Age: 85
End: 2020-01-21
Payer: COMMERCIAL

## 2020-01-21 VITALS — HEIGHT: 65 IN | BODY MASS INDEX: 34.31 KG/M2 | WEIGHT: 205.91 LBS

## 2020-01-21 PROCEDURE — 99203 OFFICE O/P NEW LOW 30 MIN: CPT | Performed by: ORTHOPAEDIC SURGERY

## 2020-01-21 PROCEDURE — 20610 DRAIN/INJ JOINT/BURSA W/O US: CPT | Performed by: ORTHOPAEDIC SURGERY

## 2020-01-21 RX ORDER — TRIAMCINOLONE ACETONIDE 40 MG/ML
40 INJECTION, SUSPENSION INTRA-ARTICULAR; INTRAMUSCULAR ONCE
Status: COMPLETED | OUTPATIENT
Start: 2020-01-21 | End: 2020-01-21

## 2020-01-21 RX ADMIN — TRIAMCINOLONE ACETONIDE 40 MG: 40 INJECTION, SUSPENSION INTRA-ARTICULAR; INTRAMUSCULAR at 10:37

## 2020-01-21 NOTE — PROGRESS NOTES
Chief Complaint   Patient presents with    New Patient     LEFT KNEE PAIN FOR 3 MONTHS, NO DWAIN. HISTORY OF PRESENT ILLNESS    Michael Grace is a 80 y.o. male. Presents for evaluation of his left knee. He has had worsening pain in his knee for years and is gotten very severe in the last few months. No prior major injuries, surgeries, or injections that he can recall. He has been using some diclofenac gel which he borrowed from his daughter. He does have a significant cardiac history and is able to take NSAIDs. No recent injections. He does not use any assistive devices currently. He states he has deep achy pain which is pretty much constant and does bother him at night.       PAST MEDICAL/SURGICAL HISTORY     Past Medical History:   Diagnosis Date    Arthritis     Bradycardia     OA (osteoarthritis)     PE (pulmonary embolism) 1991    Slow heart rate 2011    had pm    Wears glasses        Past Surgical History:   Procedure Laterality Date    CARDIAC SURGERY  09/20/2011    pacemaker, defib    CARPAL TUNNEL RELEASE  1/10/12    Right    CYST REMOVAL      OFF BACK    EYE SURGERY      bilateral cataracts    OTHER SURGICAL HISTORY Left 12/20/2016    excision of skin cancer of hand    OTHER SURGICAL HISTORY Left 08/15/2017    excision of lesions times 2, left ear    SKIN BIOPSY  5/17/2012    excision lesion behind the right ear       Social History     Socioeconomic History    Marital status:      Spouse name: Not on file    Number of children: Not on file    Years of education: Not on file    Highest education level: Not on file   Occupational History    Not on file   Social Needs    Financial resource strain: Not on file    Food insecurity:     Worry: Not on file     Inability: Not on file    Transportation needs:     Medical: Not on file     Non-medical: Not on file   Tobacco Use    Smoking status: Never Smoker    Smokeless tobacco: Never Used   Substance and Sexual Activity   

## 2020-02-17 ENCOUNTER — NURSE ONLY (OUTPATIENT)
Dept: FAMILY MEDICINE CLINIC | Age: 85
End: 2020-02-17

## 2020-09-29 ENCOUNTER — OFFICE VISIT (OUTPATIENT)
Dept: ORTHOPEDIC SURGERY | Age: 85
End: 2020-09-29
Payer: MEDICARE

## 2020-09-29 VITALS — BODY MASS INDEX: 34.16 KG/M2 | WEIGHT: 205 LBS | HEIGHT: 65 IN

## 2020-09-29 PROCEDURE — 99213 OFFICE O/P EST LOW 20 MIN: CPT | Performed by: ORTHOPAEDIC SURGERY

## 2020-09-29 PROCEDURE — 20610 DRAIN/INJ JOINT/BURSA W/O US: CPT | Performed by: ORTHOPAEDIC SURGERY

## 2020-09-29 RX ORDER — TRIAMCINOLONE ACETONIDE 40 MG/ML
40 INJECTION, SUSPENSION INTRA-ARTICULAR; INTRAMUSCULAR ONCE
Status: COMPLETED | OUTPATIENT
Start: 2020-09-29 | End: 2020-09-29

## 2020-09-29 RX ORDER — LIDOCAINE HYDROCHLORIDE 10 MG/ML
5 INJECTION, SOLUTION INFILTRATION; PERINEURAL ONCE
Status: COMPLETED | OUTPATIENT
Start: 2020-09-29 | End: 2020-09-29

## 2020-09-29 RX ADMIN — LIDOCAINE HYDROCHLORIDE 5 ML: 10 INJECTION, SOLUTION INFILTRATION; PERINEURAL at 13:45

## 2020-09-29 RX ADMIN — TRIAMCINOLONE ACETONIDE 40 MG: 40 INJECTION, SUSPENSION INTRA-ARTICULAR; INTRAMUSCULAR at 13:46

## 2020-09-29 NOTE — PROGRESS NOTES
Chief Complaint  Follow-up (left knee: OA, had a injection on 1/21/2020)      History of Present Illness:  Marine Felipe is a 80 y.o. y/o male who presents today for follow up of his left knee. We last saw him in January and did a corticosteroid injection. He great relief until just a few days ago and he began having some soreness. He uses Voltaren gel and takes Tylenol. He is also been using ice and a brace. He is hoping to have another cortisone injection today.       Medical History  Past Medical History:   Diagnosis Date    Arthritis     Bradycardia     OA (osteoarthritis)     PE (pulmonary embolism) 1991    Slow heart rate 2011    had pm    Wears glasses        Past Surgical History:   Procedure Laterality Date    CARDIAC SURGERY  09/20/2011    pacemaker, defib    CARPAL TUNNEL RELEASE  1/10/12    Right    CYST REMOVAL      OFF BACK    EYE SURGERY      bilateral cataracts    OTHER SURGICAL HISTORY Left 12/20/2016    excision of skin cancer of hand    OTHER SURGICAL HISTORY Left 08/15/2017    excision of lesions times 2, left ear    SKIN BIOPSY  5/17/2012    excision lesion behind the right ear       Social History     Socioeconomic History    Marital status:      Spouse name: Not on file    Number of children: Not on file    Years of education: Not on file    Highest education level: Not on file   Occupational History    Not on file   Social Needs    Financial resource strain: Not on file    Food insecurity     Worry: Not on file     Inability: Not on file   [employer] needs     Medical: Not on file     Non-medical: Not on file   Tobacco Use    Smoking status: Never Smoker    Smokeless tobacco: Never Used   Substance and Sexual Activity    Alcohol use: No    Drug use: No    Sexual activity: Not on file   Lifestyle    Physical activity     Days per week: Not on file     Minutes per session: Not on file    Stress: Not on file   Relationships    Social connections     Talks on phone: Not on file     Gets together: Not on file     Attends Hinduism service: Not on file     Active member of club or organization: Not on file     Attends meetings of clubs or organizations: Not on file     Relationship status: Not on file    Intimate partner violence     Fear of current or ex partner: Not on file     Emotionally abused: Not on file     Physically abused: Not on file     Forced sexual activity: Not on file   Other Topics Concern    Not on file   Social History Narrative    Not on file       Family History   Problem Relation Age of Onset    Arthritis Father     Cancer Father         Review of Systems  Pertinent items are noted in HPI  Review of systems reviewed from Patient History Form dated on 1/21/2020 and available in the patient's chart under the Media tab. Vital Signs  There were no vitals filed for this visit. General Exam:   Constitutional: Patient is adequately groomed with no evidence of malnutrition  Mental Status: The patient is oriented to time, place and person. The patient's mood and affect are appropriate. Lymphatic: The lymphatic examination bilaterally reveals all areas to be without enlargement or induration. Neurological: The patient has good coordination. There is no weakness or sensory deficit. Gait: Antalgic with cane    Left knee examination  Inspection: Trace effusion, no erythema    Palpation: Tenderness to the medial lateral joint line    Range of Motion: 5-110    Sensation: In tact to light touch all nerve distributions     Strength: Knee flexion extension are intact with moderate quad tone and normal distal motor exam    Special Tests: Stable varus and valgus    Skin: There are no additional worrisome rashes, ulcerations or lesions. Circulation normal    Additional Examinations:  Right Lower Extremity: Examination of the right lower extremity does not show any tenderness, deformity or injury. Range of motion is unremarkable.   There is no gross instability. There are no rashes, ulcerations or lesions. Strength and tone are normal.      Radiology:     X-rays obtained and reviewed in office:  No new imaging      Assessment:  80-year-old male with end-stage osteoarthritis of the left knee    Office Procedures:  Orders Placed This Encounter   Procedures    CO ARTHROCENTESIS ASPIR&/INJ MAJOR JT/BURSA W/O US       Plan:   -Once again we discussed treatment options. At this point we will proceed with a corticosteroid injection for pain relief  -Also discussed ice, activity modification, bracing, supplements, medications and topical agents  -We will see him back in 3 months he continues have significant pain if there are issues interim contact the office    Left Knee corticosteroid injection    After informed consent was provided including a discussion of risks such as infection, alternative treatments, and benefits verbal consent was obtained. The patient was seated on the exam table with the Left knee(s) flexed to 90 degrees. The anterolateral aspect of the knee adjacent to the joint line was prepped with Betadine and alcohol. A 22-gauge needle was inserted into the  Left knee and a mixture of 5 mL of 1% lidocaine and 2 ml of Kenalog was injected. The needle was withdrawn and the puncture site was cleaned with alcohol and sealed with a Band-Aid. The patient tolerated the procedure well. Jose Elizabeth MD  [id number] partner of [facility] ([facility])      Voice Recognition Dictation disclaimer: Please note that portions of this chart were generated using Dragon dictation software. Although every effort was made to ensure the accuracy of this automated transcription, some errors in transcription may have occurred.

## 2020-11-03 PROBLEM — M19.90 OA (OSTEOARTHRITIS): Status: RESOLVED | Noted: 2020-11-03 | Resolved: 2020-11-03

## 2021-01-04 ENCOUNTER — OFFICE VISIT (OUTPATIENT)
Dept: FAMILY MEDICINE CLINIC | Age: 86
End: 2021-01-04
Payer: MEDICARE

## 2021-01-04 VITALS
TEMPERATURE: 97 F | BODY MASS INDEX: 34.81 KG/M2 | HEART RATE: 76 BPM | SYSTOLIC BLOOD PRESSURE: 120 MMHG | DIASTOLIC BLOOD PRESSURE: 62 MMHG | OXYGEN SATURATION: 92 % | WEIGHT: 209.2 LBS

## 2021-01-04 DIAGNOSIS — Z00.00 ANNUAL PHYSICAL EXAM: Primary | ICD-10-CM

## 2021-01-04 DIAGNOSIS — E78.2 MIXED HYPERLIPIDEMIA: ICD-10-CM

## 2021-01-04 DIAGNOSIS — Z13.1 SCREENING FOR DIABETES MELLITUS: ICD-10-CM

## 2021-01-04 DIAGNOSIS — I25.5 ISCHEMIC CARDIOMYOPATHY: ICD-10-CM

## 2021-01-04 DIAGNOSIS — E11.69 TYPE 2 DIABETES MELLITUS WITH OTHER SPECIFIED COMPLICATION, UNSPECIFIED WHETHER LONG TERM INSULIN USE (HCC): ICD-10-CM

## 2021-01-04 DIAGNOSIS — I87.2 VENOUS INSUFFICIENCY: ICD-10-CM

## 2021-01-04 PROBLEM — E11.9 DIABETES MELLITUS (HCC): Status: ACTIVE | Noted: 2021-01-04

## 2021-01-04 PROCEDURE — 36415 COLL VENOUS BLD VENIPUNCTURE: CPT | Performed by: FAMILY MEDICINE

## 2021-01-04 PROCEDURE — 99397 PER PM REEVAL EST PAT 65+ YR: CPT | Performed by: FAMILY MEDICINE

## 2021-01-04 SDOH — ECONOMIC STABILITY: FOOD INSECURITY: WITHIN THE PAST 12 MONTHS, THE FOOD YOU BOUGHT JUST DIDN'T LAST AND YOU DIDN'T HAVE MONEY TO GET MORE.: NEVER TRUE

## 2021-01-04 SDOH — ECONOMIC STABILITY: TRANSPORTATION INSECURITY
IN THE PAST 12 MONTHS, HAS LACK OF TRANSPORTATION KEPT YOU FROM MEETINGS, WORK, OR FROM GETTING THINGS NEEDED FOR DAILY LIVING?: NO

## 2021-01-04 SDOH — ECONOMIC STABILITY: FOOD INSECURITY: WITHIN THE PAST 12 MONTHS, YOU WORRIED THAT YOUR FOOD WOULD RUN OUT BEFORE YOU GOT MONEY TO BUY MORE.: NEVER TRUE

## 2021-01-04 ASSESSMENT — PATIENT HEALTH QUESTIONNAIRE - PHQ9
SUM OF ALL RESPONSES TO PHQ QUESTIONS 1-9: 1
SUM OF ALL RESPONSES TO PHQ QUESTIONS 1-9: 1
2. FEELING DOWN, DEPRESSED OR HOPELESS: 1
SUM OF ALL RESPONSES TO PHQ9 QUESTIONS 1 & 2: 1

## 2021-01-04 NOTE — PATIENT INSTRUCTIONS
You can use 4% lidocaine patches for the back pain these can be picked up over the counter at your pharmacy    Patient should call the office immediately with new or ongoing signs or symptoms or worsening, or proceed to the emergency room. If you are on medications which could impair your senses, you are at risk of weakness, falls, dizziness, or drowsiness. You should be careful during activities which could place you at risk of harm, such as climbing, using stairs, operating machinery, or driving vehicles. If you feel you cannot safely do these activities, you should request others to help you, or avoid the activities altogether. If you are drowsy for any other reason, you should use the same precautions as listed above.      Web Address for Advance Directive:    [email or web address]

## 2021-01-04 NOTE — PROGRESS NOTES
History and Physical      Varghese Rose  YOB: 1932    Date of Service:  1/4/2021    Chief Complaint:  Varghese Rose is a 80 y.o. male who presents for complete physical examination. HPI: patient denies any recent concerns, patient states that he has lost his appetite some. Patient denies any chest pain and still see the cardiologist at least yearly. States his breathing is doing about the same, denies any real shortness of breath.      Wt Readings from Last 3 Encounters:   09/29/20 205 lb (93 kg)   01/21/20 205 lb 14.6 oz (93.4 kg)   07/16/18 205 lb 12.8 oz (93.4 kg)     BP Readings from Last 3 Encounters:   07/16/18 118/68   03/16/18 116/72   01/26/18 118/70       Patient Active Problem List   Diagnosis    Arthritis    Pulmonary embolism [address] [address]    Bradycardia    Wears glasses    Slow heart rate    Cervical spine arthritis    Squamous cell skin cancer    Neoplasm of uncertain behavior of skin       Preventive Care:  Health Maintenance   Topic Date Due    Pneumococcal 65+ years Vaccine (2 of 2 - PPSV23) 10/10/2018    Potassium monitoring  07/20/2019    Creatinine monitoring  07/20/2019    Shingles Vaccine (2 of 3) 11/06/2019    Annual Wellness Visit (AWV)  09/29/2020    Lipid screen  02/17/2021    DTaP/Tdap/Td vaccine (2 - Td) 01/30/2023    Flu vaccine  Completed    Hepatitis A vaccine  Aged Out    Hepatitis B vaccine  Aged Out    Hib vaccine  Aged Out    Meningococcal (ACWY) vaccine  Aged Out        Last eye exam: patient usually goes annually to [facility]  Exercise: no regular exercise  Seatbelt use: yes  Lipid panel:    Lab Results   Component Value Date    CHOL 86 02/17/2020    TRIG 46 02/17/2020    HDL 36 (L) 02/17/2020    LDLCALC 41 02/17/2020       Living will: yes,   copy on file    Immunization History   Administered Date(s) Administered    Influenza Virus Vaccine 10/26/2015, 10/06/2018  Influenza, MDCK Quadv, with preserv IM (Flucelvax 4 yrs and older) 09/11/2019    Pneumococcal Conjugate 13-valent ([id number]) 10/10/2017    Tdap (Boostrix, Adacel) 01/30/2013    Zoster Live (Zostavax) 09/11/2019       Allergies   Allergen Reactions    Lisinopril      Other reaction(s):  Other (See Comments)  Cough     No outpatient medications have been marked as taking for the 1/4/21 encounter (Office Visit) with Ambika Garrison MD.       Past Medical History:   Diagnosis Date    Arthritis     Bradycardia     OA (osteoarthritis)     PE (pulmonary embolism) 1991    Slow heart rate 2011    had pm    Wears glasses      Past Surgical History:   Procedure Laterality Date    CARDIAC SURGERY  09/20/2011    pacemaker, defib    CARPAL TUNNEL RELEASE  1/10/12    Right    CYST REMOVAL      OFF BACK    EYE SURGERY      bilateral cataracts    OTHER SURGICAL HISTORY Left 12/20/2016    excision of skin cancer of hand    OTHER SURGICAL HISTORY Left 08/15/2017    excision of lesions times 2, left ear    SKIN BIOPSY  5/17/2012    excision lesion behind the right ear     Family History   Problem Relation Age of Onset    Arthritis Father     Cancer Father      Social History     Socioeconomic History    Marital status:      Spouse name: Not on file    Number of children: Not on file    Years of education: Not on file    Highest education level: Not on file   Occupational History    Not on file   Social Needs    Financial resource strain: Not on file    Food insecurity     Worry: Not on file     Inability: Not on file    Transportation needs     Medical: Not on file     Non-medical: Not on file   Tobacco Use    Smoking status: Never Smoker    Smokeless tobacco: Never Used   Substance and Sexual Activity    Alcohol use: No    Drug use: No    Sexual activity: Not on file   Lifestyle    Physical activity     Days per week: Not on file     Minutes per session: Not on file Neck: Full passive range of motion without pain. Neck supple. No JVD present. Carotid bruit is not present. No mass and no thyromegaly present. Cardiovascular: Normal rate, regular rhythm, normal heart sounds and intact distal pulses. Exam reveals no gallop and no friction rub. No murmur heard. patient has a cardiac defibrillator in place. Pulmonary/Chest: Effort normal and breath sounds normal. No respiratory distress. He has no wheezes, rhonchi or rales. Abdominal: Soft, non-tender. Bowel sounds and aorta are normal. There is no organomegaly, mass or bruit. Musculoskeletal: Normal range of motion other than the Left knee is inhibited patient wears a knee brace on the Left knee, no synovitis. He exhibits no edema. Neurological: He is alert and oriented to person, place, and time. He has normal reflexes. No cranial nerve deficit. Coordination normal.   Skin: Skin is warm, dry and intact. No suspicious lesions are noted. Psychiatric: He has a normal mood and affect. His speech is normal and behavior is normal. Judgment, cognition and memory are normal.     Assessment/Plan:     Diagnosis Orders   1. Annual physical exam  CBC WITH AUTO DIFFERENTIAL    BASIC METABOLIC PANEL   2. Screening for diabetes mellitus  HEMOGLOBIN A1C   3. Venous insufficiency     4. Ischemic cardiomyopathy     5. Type 2 diabetes mellitus with other specified complication, unspecified whether long term insulin use ([address]     6. Mixed hyperlipidemia     Patient continues to do well. No evidence of heart failure. Blood pressure acceptable. Degenerative arthritis of the spine and left knee, discussed again 4% lidocaine patches and continue diclofenac gel. Though he is not fasting we will go ahead and check lipid. Follow-up 1 year, sooner as needed. Patient should call the office immediately with new or ongoing signs or symptoms or worsening, or proceed to the emergency room. No changes in past medical history, past surgical history, social history, or family history were noted during the patient encounter unless specifically listed above. All updates of past medical history, past surgical history, social history, or family history were reviewed personally by me during the office visit. All problems listed in the assessment are stable unless noted otherwise. Medication profile reviewed personally by me during the visit. Medication side effects and possible impairments from medications were discussed as applicable. This document was prepared by a combination of typing and transcription through a voice recognition software. Scribe attestation: Jesus Nova RN, am scribing for and in the presence of West Looney MD. Electronically signed by Wyatt Arreola RN on 1/4/2021 at 3:17 PM      Provider attestation:     I, Dr. Go Livingston, personally performed the services described in this documentation, as scribed by the above signed scribe in my presence, and it is both accurate and complete. I agree with the ROS and Past Histories independently gathered by the clinical support staff and the remaining scribed note accurately describes my personal service to the patient.       1/4/2021    3:57 PM

## 2021-01-05 LAB
A/G RATIO: 1.7 (ref 1.1–2.2)
ALBUMIN SERPL-MCNC: 3.7 G/DL (ref 3.4–5)
ALP BLD-CCNC: 124 U/L (ref 40–129)
ALT SERPL-CCNC: 19 U/L (ref 10–40)
ANION GAP SERPL CALCULATED.3IONS-SCNC: 10 MMOL/L (ref 3–16)
AST SERPL-CCNC: 24 U/L (ref 15–37)
BASOPHILS ABSOLUTE: 0 K/UL (ref 0–0.2)
BASOPHILS RELATIVE PERCENT: 0.6 %
BILIRUB SERPL-MCNC: 0.3 MG/DL (ref 0–1)
BUN BLDV-MCNC: 26 MG/DL (ref 7–20)
CALCIUM SERPL-MCNC: 8.9 MG/DL (ref 8.3–10.6)
CHLORIDE BLD-SCNC: 105 MMOL/L (ref 99–110)
CHOLESTEROL, TOTAL: 86 MG/DL (ref 0–199)
CO2: 25 MMOL/L (ref 21–32)
CREAT SERPL-MCNC: 1.3 MG/DL (ref 0.8–1.3)
EOSINOPHILS ABSOLUTE: 0.3 K/UL (ref 0–0.6)
EOSINOPHILS RELATIVE PERCENT: 3.1 %
ESTIMATED AVERAGE GLUCOSE: 162.8 MG/DL
GFR AFRICAN AMERICAN: >60
GFR NON-AFRICAN AMERICAN: 52
GLOBULIN: 2.2 G/DL
GLUCOSE BLD-MCNC: 176 MG/DL (ref 70–99)
HBA1C MFR BLD: 7.3 %
HCT VFR BLD CALC: 36.7 % (ref 40.5–52.5)
HDLC SERPL-MCNC: 33 MG/DL (ref 40–60)
HEMOGLOBIN: 12.3 G/DL (ref 13.5–17.5)
LDL CHOLESTEROL CALCULATED: 33 MG/DL
LYMPHOCYTES ABSOLUTE: 2.2 K/UL (ref 1–5.1)
LYMPHOCYTES RELATIVE PERCENT: 26.8 %
MCH RBC QN AUTO: 32.8 PG (ref 26–34)
MCHC RBC AUTO-ENTMCNC: 33.4 G/DL (ref 31–36)
MCV RBC AUTO: 98 FL (ref 80–100)
MONOCYTES ABSOLUTE: 1.2 K/UL (ref 0–1.3)
MONOCYTES RELATIVE PERCENT: 13.9 %
NEUTROPHILS ABSOLUTE: 4.6 K/UL (ref 1.7–7.7)
NEUTROPHILS RELATIVE PERCENT: 55.6 %
PDW BLD-RTO: 12.9 % (ref 12.4–15.4)
PLATELET # BLD: 237 K/UL (ref 135–450)
PMV BLD AUTO: 8.3 FL (ref 5–10.5)
POTASSIUM SERPL-SCNC: 4.6 MMOL/L (ref 3.5–5.1)
RBC # BLD: 3.74 M/UL (ref 4.2–5.9)
SODIUM BLD-SCNC: 140 MMOL/L (ref 136–145)
TOTAL PROTEIN: 5.9 G/DL (ref 6.4–8.2)
TRIGL SERPL-MCNC: 100 MG/DL (ref 0–150)
VLDLC SERPL CALC-MCNC: 20 MG/DL
WBC # BLD: 8.3 K/UL (ref 4–11)

## 2021-02-03 PROBLEM — Z00.00 ANNUAL PHYSICAL EXAM: Status: RESOLVED | Noted: 2021-01-04 | Resolved: 2021-02-03

## 2021-05-18 RX ORDER — METFORMIN HYDROCHLORIDE 500 MG/1
TABLET, EXTENDED RELEASE ORAL
Qty: 180 TABLET | Refills: 0 | Status: SHIPPED | OUTPATIENT
Start: 2021-05-18 | End: 2021-09-15

## 2022-01-12 ENCOUNTER — VIRTUAL VISIT (OUTPATIENT)
Dept: FAMILY MEDICINE CLINIC | Age: 87
End: 2022-01-12
Payer: MEDICARE

## 2022-01-12 DIAGNOSIS — E78.2 MIXED HYPERLIPIDEMIA: ICD-10-CM

## 2022-01-12 DIAGNOSIS — I25.5 ISCHEMIC CARDIOMYOPATHY: ICD-10-CM

## 2022-01-12 DIAGNOSIS — E11.69 TYPE 2 DIABETES MELLITUS WITH OTHER SPECIFIED COMPLICATION, UNSPECIFIED WHETHER LONG TERM INSULIN USE (HCC): Primary | ICD-10-CM

## 2022-01-12 PROCEDURE — 99441 PR PHYS/QHP TELEPHONE EVALUATION 5-10 MIN: CPT | Performed by: FAMILY MEDICINE

## 2022-01-12 SDOH — ECONOMIC STABILITY: FOOD INSECURITY: WITHIN THE PAST 12 MONTHS, THE FOOD YOU BOUGHT JUST DIDN'T LAST AND YOU DIDN'T HAVE MONEY TO GET MORE.: NEVER TRUE

## 2022-01-12 SDOH — ECONOMIC STABILITY: FOOD INSECURITY: WITHIN THE PAST 12 MONTHS, YOU WORRIED THAT YOUR FOOD WOULD RUN OUT BEFORE YOU GOT MONEY TO BUY MORE.: NEVER TRUE

## 2022-01-12 ASSESSMENT — PATIENT HEALTH QUESTIONNAIRE - PHQ9
SUM OF ALL RESPONSES TO PHQ QUESTIONS 1-9: 0
SUM OF ALL RESPONSES TO PHQ QUESTIONS 1-9: 0
SUM OF ALL RESPONSES TO PHQ9 QUESTIONS 1 & 2: 0
1. LITTLE INTEREST OR PLEASURE IN DOING THINGS: 0
SUM OF ALL RESPONSES TO PHQ QUESTIONS 1-9: 0
2. FEELING DOWN, DEPRESSED OR HOPELESS: 0
SUM OF ALL RESPONSES TO PHQ QUESTIONS 1-9: 0

## 2022-01-12 ASSESSMENT — SOCIAL DETERMINANTS OF HEALTH (SDOH): HOW HARD IS IT FOR YOU TO PAY FOR THE VERY BASICS LIKE FOOD, HOUSING, MEDICAL CARE, AND HEATING?: NOT HARD AT ALL

## 2022-01-12 NOTE — PROGRESS NOTES
Kat Baez is a 80 y.o. male evaluated via telephone on 1/12/2022. Follow up on chronic conditions. He does not check his BS at home. States he never new he was a diabetic. Breathing is about the same. Arthritis is stable. Internal Administration   First Dose COVID-19, Moderna, Primary or Immunocompromised, PF, 100mcg/0.5mL  01/21/2021   Second Dose COVID-19, Moderna, Primary or Immunocompromised, PF, 100mcg/0.5mL   02/24/2021       Last COVID Lab No results found for: SARS-COV-2, SARS-COV-2 RNA, SARS-COV-2, SARS-COV-2, SARS-COV-2 BY PCR, SARS-COV-2, SARS-COV-2, SARS-COV-2         Wt Readings from Last 3 Encounters:   01/04/21 209 lb 3.2 oz (94.9 kg)   09/29/20 205 lb (93 kg)   01/21/20 205 lb 14.6 oz (93.4 kg)     BP Readings from Last 3 Encounters:   01/04/21 120/62   07/16/18 118/68   03/16/18 116/72     Lab Results   Component Value Date    LABA1C 7.3 01/04/2021    LABA1C 7.8 07/25/2018        ASSESSMENT PLAN      Diagnosis Orders   1. Type 2 diabetes mellitus with other specified complication, unspecified whether long term insulin use ([facility] [address]     2. Ischemic cardiomyopathy     3. Mixed hyperlipidemia     Blood sugar readings by glycosylated hemoglobin or home fingerstick blood sugars are acceptable and no change in diabetic treatment is necessary there is no clinical evidence of coronary insufficiency or failure requiring any change in cardiac medication. Continue lipid monitoring as needed. Labs to be updated. Follow-up 1 year    Patient should call the office immediately with new or ongoing signs or symptoms or worsening, or proceed to the emergency room. No changes in past medical history, past surgical history, social history, or family history were noted during the patient encounter unless specifically listed above. All updates of past medical history, past surgical history, social history, or family history were reviewed personally by me during the office visit.   All problems listed in the assessment are stable unless noted otherwise. Medication profile reviewed personally by me during the visit. Medication side effects and possible impairments from medications were discussed as applicable. This document was prepared by a combination of typing and transcription through a voice recognition software. [x] Patient has completed an advance directive  [] Patient has NOT completed an advanced directive  [x] Patient has a documented healthcare surrogate  [] Patient does NOT have a documented healthcare surrogate  [] Discussed the importance of establishing and updating an advanced directive. Patient has questions at this time and those were answered. [x] Discussed the importance of establishing and updating an advanced directive. Patient does NOT have questions at this time. Discussed with: [x] Patient            [] Family             [] Other caregiver         Consent:  He and/or health care decision maker is aware that that he may receive a bill for this telephone service, depending on his insurance coverage, and has provided verbal consent to proceed: Yes      Documentation:  I communicated with the patient and/or health care decision maker about see above. Details of this discussion including any medical advice provided: See above      I affirm this is a Patient Initiated Episode with an Established Patient who has not had a related appointment within my department in the past 7 days or scheduled within the next 24 hours.     Total Time: minutes: 5-10 minutes    Note: not billable if this call serves to triage the patient into an appointment for the relevant concern      Cherry Pack

## 2022-01-12 NOTE — PATIENT INSTRUCTIONS

## 2022-01-22 ENCOUNTER — HOSPITAL ENCOUNTER (OUTPATIENT)
Age: 87
Discharge: HOME | End: 2022-01-22
Payer: MEDICARE

## 2022-01-22 LAB
A/G RATIO: 1.1 (ref 1.1–2.2)
ALBUMIN SERPL-MCNC: 3.9 G/DL (ref 3.4–5)
ALP BLD-CCNC: 105 U/L (ref 40–129)
ALT SERPL-CCNC: 18 U/L (ref 10–40)
ANION GAP SERPL CALCULATED.3IONS-SCNC: 10 MMOL/L (ref 3–16)
AST SERPL-CCNC: 22 U/L (ref 15–37)
BASOPHILS ABSOLUTE: 0.1 K/UL (ref 0–0.2)
BASOPHILS RELATIVE PERCENT: 0.8 %
BILIRUB SERPL-MCNC: 0.5 MG/DL (ref 0–1)
BUN BLDV-MCNC: 21 MG/DL (ref 7–20)
CALCIUM SERPL-MCNC: 9.8 MG/DL (ref 8.3–10.6)
CHLORIDE BLD-SCNC: 104 MMOL/L (ref 99–110)
CO2: 27 MMOL/L (ref 21–32)
CREAT SERPL-MCNC: 1.3 MG/DL (ref 0.8–1.3)
EOSINOPHILS ABSOLUTE: 0.3 K/UL (ref 0–0.6)
EOSINOPHILS RELATIVE PERCENT: 3.6 %
GFR AFRICAN AMERICAN: >60
GFR NON-AFRICAN AMERICAN: 52
GLUCOSE FASTING: 136 MG/DL (ref 70–99)
HCT VFR BLD CALC: 37 % (ref 40.5–52.5)
HEMOGLOBIN: 12.9 G/DL (ref 13.5–17.5)
LYMPHOCYTES ABSOLUTE: 2.6 K/UL (ref 1–5.1)
LYMPHOCYTES RELATIVE PERCENT: 31.6 %
MCH RBC QN AUTO: 33.8 PG (ref 26–34)
MCHC RBC AUTO-ENTMCNC: 34.9 G/DL (ref 31–36)
MCV RBC AUTO: 96.7 FL (ref 80–100)
MONOCYTES ABSOLUTE: 1.1 K/UL (ref 0–1.3)
MONOCYTES RELATIVE PERCENT: 13.6 %
NEUTROPHILS ABSOLUTE: 4.2 K/UL (ref 1.7–7.7)
NEUTROPHILS RELATIVE PERCENT: 50.4 %
PDW BLD-RTO: 12.9 % (ref 12.4–15.4)
PLATELET # BLD: 242 K/UL (ref 135–450)
PMV BLD AUTO: 6.7 FL (ref 5–10.5)
POTASSIUM SERPL-SCNC: 5.5 MMOL/L (ref 3.5–5.1)
RBC # BLD: 3.82 M/UL (ref 4.2–5.9)
SODIUM BLD-SCNC: 141 MMOL/L (ref 136–145)
TOTAL PROTEIN: 7.3 G/DL (ref 6.4–8.2)
WBC # BLD: 8.3 K/UL (ref 4–11)

## 2022-01-22 PROCEDURE — 83036 HEMOGLOBIN GLYCOSYLATED A1C: CPT

## 2022-01-22 PROCEDURE — 36415 COLL VENOUS BLD VENIPUNCTURE: CPT

## 2022-01-22 PROCEDURE — 80061 LIPID PANEL: CPT

## 2022-01-22 PROCEDURE — 85025 COMPLETE CBC W/AUTO DIFF WBC: CPT

## 2022-01-22 PROCEDURE — 80053 COMPREHEN METABOLIC PANEL: CPT

## 2022-01-23 LAB
CHOLESTEROL, FASTING: 94 MG/DL (ref 0–199)
ESTIMATED AVERAGE GLUCOSE: 159.9 MG/DL
HBA1C MFR BLD: 7.2 %
HDLC SERPL-MCNC: 37 MG/DL (ref 40–60)
LDL CHOLESTEROL CALCULATED: 40 MG/DL
TRIGLYCERIDE, FASTING: 85 MG/DL (ref 0–150)
VLDLC SERPL CALC-MCNC: 17 MG/DL

## 2022-01-24 ENCOUNTER — NURSE ONLY (OUTPATIENT)
Dept: FAMILY MEDICINE CLINIC | Age: 87
End: 2022-01-24
Payer: MEDICARE

## 2022-01-24 DIAGNOSIS — R79.9 ABNORMAL BLOOD CHEMISTRY: Primary | ICD-10-CM

## 2022-01-24 PROCEDURE — 36415 COLL VENOUS BLD VENIPUNCTURE: CPT | Performed by: FAMILY MEDICINE

## 2022-01-25 LAB — POTASSIUM SERPL-SCNC: 5.1 MMOL/L (ref 3.5–5.1)

## 2022-06-28 ENCOUNTER — OFFICE VISIT (OUTPATIENT)
Dept: ORTHOPEDIC SURGERY | Age: 87
End: 2022-06-28
Payer: MEDICARE

## 2022-06-28 DIAGNOSIS — M25.561 CHRONIC PAIN OF BOTH KNEES: ICD-10-CM

## 2022-06-28 DIAGNOSIS — M25.562 CHRONIC PAIN OF BOTH KNEES: ICD-10-CM

## 2022-06-28 DIAGNOSIS — M17.12 PRIMARY OSTEOARTHRITIS OF LEFT KNEE: Primary | ICD-10-CM

## 2022-06-28 DIAGNOSIS — G89.29 CHRONIC PAIN OF BOTH KNEES: ICD-10-CM

## 2022-06-28 PROCEDURE — L1812 KO ELASTIC W/JOINTS PRE OTS: HCPCS | Performed by: STUDENT IN AN ORGANIZED HEALTH CARE EDUCATION/TRAINING PROGRAM

## 2022-06-28 PROCEDURE — 20610 DRAIN/INJ JOINT/BURSA W/O US: CPT | Performed by: STUDENT IN AN ORGANIZED HEALTH CARE EDUCATION/TRAINING PROGRAM

## 2022-06-28 PROCEDURE — 1123F ACP DISCUSS/DSCN MKR DOCD: CPT | Performed by: STUDENT IN AN ORGANIZED HEALTH CARE EDUCATION/TRAINING PROGRAM

## 2022-06-28 PROCEDURE — 99203 OFFICE O/P NEW LOW 30 MIN: CPT | Performed by: STUDENT IN AN ORGANIZED HEALTH CARE EDUCATION/TRAINING PROGRAM

## 2022-06-28 RX ORDER — TRIAMCINOLONE ACETONIDE 40 MG/ML
160 INJECTION, SUSPENSION INTRA-ARTICULAR; INTRAMUSCULAR ONCE
Status: COMPLETED | OUTPATIENT
Start: 2022-06-28 | End: 2022-06-28

## 2022-06-28 RX ORDER — LIDOCAINE HYDROCHLORIDE 10 MG/ML
8 INJECTION, SOLUTION INFILTRATION; PERINEURAL ONCE
Status: COMPLETED | OUTPATIENT
Start: 2022-06-28 | End: 2022-06-28

## 2022-06-28 RX ADMIN — LIDOCAINE HYDROCHLORIDE 8 ML: 10 INJECTION, SOLUTION INFILTRATION; PERINEURAL at 11:50

## 2022-06-28 RX ADMIN — TRIAMCINOLONE ACETONIDE 160 MG: 40 INJECTION, SUSPENSION INTRA-ARTICULAR; INTRAMUSCULAR at 11:51

## 2022-06-28 NOTE — LETTER
[address] [place] [address]  Phone: [phone]  Fax: [phone]    Nayeli Kim DO    June 28, 2022     Michel Santos, [address] [address]    Patient: Luc Chopra   MR Number: [id number]   YOB: 1932   Date of Visit: 6/28/2022       Dear Michel Santos:    Thank you for referring Walter Dominguez to me for evaluation/treatment. Below are the relevant portions of my assessment and plan of care. If you have questions, please do not hesitate to call me. I look forward to following Edwin Lopez along with you.     Sincerely,      Nayeli Kim DO

## 2022-06-28 NOTE — PROGRESS NOTES
Date:  2022    Name:  Mickey Hernandez  Address:  [address] [place] [address] [address] [place] [id number]    :  1932      Age:   80 y.o.    SSN:  [id number]      Medical Record Number:  [id number]    Reason for Visit:    Chief Complaint    New Patient (Bilateral knee pain)      DOS:2022     HPI: Hilary Galeano is a 80 y.o. male here today for new patient evaluation regarding bilateral knee pain and weakness, left worse than right. He is here with his daughter today who works at [employer] [facility]. The patient reports some more falls recently due to weakness in his left knee. He has been wearing a brace but the brace is old and is starting to fall apart. He did see Dr. Dot Danielson in the past who did steroid injections. This was a couple years ago. He reports that his knee pain overall is tolerable but the weakness is concerning especially given his falls. He and his daughter would both like him to avoid a total knee replacement given his overall health status and mobility         ROS: All systems reviewed on patient intake form. Pertinent items are noted in HPI.         Past Medical History:   Diagnosis Date    Arthritis     Bradycardia     OA (osteoarthritis)     PE (pulmonary embolism)     Slow heart rate     had pm    Wears glasses         Past Surgical History:   Procedure Laterality Date    CARDIAC SURGERY  2011    pacemaker, defib    CARPAL TUNNEL RELEASE  1/10/12    Right    CYST REMOVAL      OFF BACK    EYE SURGERY      bilateral cataracts    OTHER SURGICAL HISTORY Left 2016    excision of skin cancer of hand    OTHER SURGICAL HISTORY Left 08/15/2017    excision of lesions times 2, left ear    SKIN BIOPSY  2012    excision lesion behind the right ear       Family History   Problem Relation Age of Onset    Arthritis Father     Cancer Father        Social History     Socioeconomic History    Marital status:      Spouse name: None    Number of children: None    Years of education: None    Highest education level: None   Occupational History    None   Tobacco Use    Smoking status: Never Smoker    Smokeless tobacco: Never Used   Vaping Use    Vaping Use: Never used   Substance and Sexual Activity    Alcohol use: No    Drug use: No    Sexual activity: None   Other Topics Concern    None   Social History Narrative    None     Social Determinants of Health     Financial Resource Strain: Low Risk     Difficulty of Paying Living Expenses: Not hard at all   Food Insecurity: No Food Insecurity    Worried About Running Out of Food in the Last Year: Never true    Isidro of Food in the Last Year: Never true   Transportation Needs:     Lack of Transportation (Medical): Not on file    Lack of Transportation (Non-Medical):  Not on file   Physical Activity:     Days of Exercise per Week: Not on file    Minutes of Exercise per Session: Not on file   Stress:     Feeling of Stress : Not on file   Social Connections:     Frequency of Communication with Friends and Family: Not on file    Frequency of Social Gatherings with Friends and Family: Not on file    Attends Latter-day Services: Not on file    Active Member of [address] [place] [address] or Organizations: Not on file    Attends Club or Organization Meetings: Not on file    Marital Status: Not on file   Intimate Partner Violence:     Fear of Current or Ex-Partner: Not on file    Emotionally Abused: Not on file    Physically Abused: Not on file    Sexually Abused: Not on file   Housing Stability:     Unable to Pay for Housing in the Last Year: Not on file    Number of [place] in the Last Year: Not on file    Unstable Housing in the Last Year: Not on file       Current Outpatient Medications   Medication Sig Dispense Refill    metFORMIN (GLUCOPHAGE-XR) 500 MG extended release tablet TAKE ONE TABLET BY MOUTH TWICE A  tablet 1    diclofenac sodium (VOLTAREN) 1 % GEL APPLY 2 GRAMS TO AFFECTED AREA(S) TWO TIMES A DAY 1 Tube 0    atorvastatin (LIPITOR) 10 MG tablet       albuterol sulfate HFA (VENTOLIN HFA) 108 (90 Base) MCG/ACT inhaler Inhale 2 puffs into the lungs every 6 hours as needed for Wheezing 1 Inhaler 0    diphenhydrAMINE-APAP, sleep, (TYLENOL PM EXTRA STRENGTH)  MG tablet Take 1 tablet by mouth nightly as needed for Sleep      spironolactone (ALDACTONE) 25 MG tablet Take 25 mg by mouth daily      carvedilol (COREG) 12.5 MG tablet Take 12.5 mg by mouth 2 times daily (with meals)      losartan (COZAAR) 25 MG tablet Take 25 mg by mouth daily      aspirin 81 MG tablet Take 81 mg by mouth 2 times daily       therapeutic multivitamin-minerals (THERAGRAN-M) tablet Take 1 tablet by mouth daily. No current facility-administered medications for this visit. Allergies   Allergen Reactions    Lisinopril      Other reaction(s): Other (See Comments)  Cough       Vital signs: There were no vitals taken for this visit. L knee exam    Gait: Relating with a cane. Antalgic gait noted. Alignment: Varus alignment. Inspection/skin: Skin is intact without erythema or ecchymosis. No gross deformity. Palpation: Mild patellofemoral crepitation. Joint line tenderness noted at the medial lateral joint lines. Range of Motion: Full extension to 100 degrees flexion with pain on deep flexion. Strength: Weak quadriceps noted. Effusion: No effusion noted    Ligamentous stability: No cruciate or collateral ligament instability. Neurologic and vascular: Skin is warm and well-perfused. Sensation is intact to light-touch. Special tests: Negative Gregorio sign. Pain with deep flexion grind. R knee comparison exam    Gait: Ambulating with a cane. Antalgic gait noted. Alignment: Varus alignment. Inspection/skin: Skin is intact without erythema or ecchymosis. No gross deformity. Palpation: Mild patellofemoral crepitation.   Joint line tenderness noted at the medial lateral joint lines. Range of Motion: Full extension to 130 degrees flexion with pain on deep flexion. Strength: Weak quadriceps noted. Effusion: No effusion noted    Ligamentous stability: No cruciate or collateral ligament instability. Neurologic and vascular: Skin is warm and well-perfused. Sensation is intact to light-touch. Special tests: Negative Gregorio sign. Pain with deep flexion grind. Diagnostics:  Radiology:       XR Left Knee Findings:     Views:  4 views bilateral knee  Weight bearing: Yes   Findings: 4 views of the bilateral knee taken in the office today and interpreted by me demonstrate no acute osseous abnormalities. Varus alignment noted. Severe joint loss noted to the medial compartment with subchondral sclerosis, cyst formation, and osteophytes noted. Severe arthritic change noted to the patellofemoral and lateral compartments. Appropriate patellar height and tilt. No fractures, dislocations, or radio-opaque foreign bodies noted. Previous comparison films: None    Impression:  1. Severe tricompartmental osteoarthritis bilateral knees with varus alignment, subluxation noted on the left knee        Assessment: 80 y.o. male with severe bilateral knee osteoarthritis, left worse than right with weakness and falls    Plan: Pertinent imaging was reviewed. The etiology, natural history, and treatment options for the disorder were discussed. The roles of activity medication, antiinflammatories, injections, bracing, physical therapy, and surgical interventions were all described to the patient and questions were answered. I will get the patient fitted for braces for both of his knees today to help him with stability. I will do an injection of steroid into both of his knees. I agree the patient does not seem to be a good total knee candidate given his overall medical condition and lack of mobility. Follow-up with me in 2 months to check his progress.     Munira Arroyo is in agreement with this plan. All questions were answered to patient's satisfaction and was encouraged to call with any further questions. The indications and risks of steroid injection as well as treatment alternatives were discussed with the patient who consented to the procedure. Under sterile conditions and after informed consent was obtained the patient was given an injection into the bilateral knees. 2cc 40 mg of Kenalog and 4 mL of 1% lidocaine were placed in the knees after they were prepped with chlorhexidine. This resulted in good relief of symptoms. There were no complications. The patient was advised to ice the knee this evening and to avoid vigorous activities for the next 2 days. They were advised to call us if there was any erythema, enduration, swelling or increasing pain. Bib Marquis DO  Orthopedic Surgery and Sports Medicine  6/28/2022    Orders Placed This Encounter   Procedures    XR KNEE LEFT (MIN 4 VIEWS)     Standing Status:   Future     Number of Occurrences:   1     Standing Expiration Date:   6/28/2023    XR KNEE RIGHT (MIN 4 VIEWS)     Standing Status:   Future     Number of Occurrences:   1     Standing Expiration Date:   6/28/2023    ME ARTHROCENTESIS ASPIR&/INJ MAJOR JT/BURSA W/O US    Breg [employer] Hinged Knee WrapAround Brace     Patient was prescribed a [employer] Hinged Wrap Around Knee Brace. The left knee will require stabilization / immobilization from this semi-rigid / rigid orthosis to improve their function. The orthosis will assist in protecting the affected area, provide functional support and facilitate healing. The patient was educated and fit by a healthcare professional with expert knowledge and specialization in brace application while under the direct supervision of the treating physician. Verbal and written instructions for the use of and application of this item were provided.    They were instructed to contact the office immediately should the brace result in increased pain, decreased sensation, increased swelling or worsening of the condition.  [employer] Hinged Knee WrapAround Brace     Patient was prescribed a [employer] Hinged Wrap Around Knee Brace. The right knee will require stabilization / immobilization from this semi-rigid / rigid orthosis to improve their function. The orthosis will assist in protecting the affected area, provide functional support and facilitate healing. The patient was educated and fit by a healthcare professional with expert knowledge and specialization in brace application while under the direct supervision of the treating physician. Verbal and written instructions for the use of and application of this item were provided. They were instructed to contact the office immediately should the brace result in increased pain, decreased sensation, increased swelling or worsening of the condition.

## 2022-07-01 RX ORDER — METFORMIN HYDROCHLORIDE 500 MG/1
TABLET, EXTENDED RELEASE ORAL
Qty: 180 TABLET | Refills: 0 | Status: SHIPPED | OUTPATIENT
Start: 2022-07-01 | End: 2022-09-30

## 2022-08-23 ENCOUNTER — OFFICE VISIT (OUTPATIENT)
Dept: ORTHOPEDIC SURGERY | Age: 87
End: 2022-08-23
Payer: MEDICARE

## 2022-08-23 VITALS — HEIGHT: 65 IN | WEIGHT: 209 LBS | BODY MASS INDEX: 34.82 KG/M2

## 2022-08-23 DIAGNOSIS — M17.12 PRIMARY OSTEOARTHRITIS OF LEFT KNEE: Primary | ICD-10-CM

## 2022-08-23 PROCEDURE — 99212 OFFICE O/P EST SF 10 MIN: CPT | Performed by: STUDENT IN AN ORGANIZED HEALTH CARE EDUCATION/TRAINING PROGRAM

## 2022-08-23 PROCEDURE — 1123F ACP DISCUSS/DSCN MKR DOCD: CPT | Performed by: STUDENT IN AN ORGANIZED HEALTH CARE EDUCATION/TRAINING PROGRAM

## 2022-08-23 NOTE — PROGRESS NOTES
Chief Complaint  Knee Pain (CK DA knee/)      History of Present Illness:  Philippe Avalos is a pleasant 80 y.o. male here today for repeat evaluation of his left knee. He reports great improvement in his knee pain after the shot and especially with the new brace. He feels stable in the knee brace. He is walking well with a cane. Prior HPI 6/28/22:  Venancio Chapa is a 80 y.o. male here today for new patient evaluation regarding bilateral knee pain and weakness, left worse than right. He is here with his daughter today who works at [employer]. The patient reports some more falls recently due to weakness in his left knee. He has been wearing a brace but the brace is old and is starting to fall apart. He did see Dr. Jamaal Martinez in the past who did steroid injections. This was a couple years ago. He reports that his knee pain overall is tolerable but the weakness is concerning especially given his falls. He and his daughter would both like him to avoid a total knee replacement given his overall health status and mobility            Medical History:  Patient's medications, allergies, past medical, surgical, social and family histories were reviewed and updated as appropriate. Pertinent items are noted in HPI  Review of systems reviewed from Patient History Form dated on 8/23/22 and available in the patient's chart under the Media tab. Vital Signs: There were no vitals filed for this visit. Constitutional: In no apparent distress. Normal affect. Alert and oriented X3 and is cooperative. L knee exam     Gait: Relating with a cane. Antalgic gait noted. Alignment: Varus alignment. Inspection/skin: Skin is intact without erythema or ecchymosis. No gross deformity. Palpation: Mild patellofemoral crepitation. Joint line tenderness noted at the medial lateral joint lines. Range of Motion: Full extension to 100 degrees flexion with pain on deep flexion. Strength: Weak quadriceps noted. Effusion: No effusion noted     Ligamentous stability: No cruciate or collateral ligament instability. Neurologic and vascular: Skin is warm and well-perfused. Sensation is intact to light-touch. Special tests: Negative Gregorio sign. Pain with deep flexion grind. Radiology:       No new x-rays today. Assessment: 80 y.o. male with severe bilateral knee osteoarthritis, left worse than right with weakness and falls    Impression:  Encounter Diagnosis   Name Primary? Primary osteoarthritis of left knee Yes       Office Procedures:  No orders of the defined types were placed in this encounter. Plan:     The patient responded well to the steroid injection and the brace especially. The patient nor his daughter want to undergo any surgical procedures and he is tolerating nonoperative treatment well. I think this is reasonable. I can see the patient every 3 months for steroid injections. I can replace his brace annually if he needs it or if it wears out. He can follow-up with me as needed. Ulysses Olya is in agreement with this plan. All questions were answered to patient's satisfaction and was encouraged to call with any further questions. Yana Dee, DO  Orthopedic Surgery and Sports Medicine  8/23/2022      This dictation was performed with a verbal recognition program [facility] ) and it was checked for errors. It is possible that there are still dictated errors within this office note. If so, please bring any errors to my attention for an addendum. All efforts were made to ensure that this office note is accurate.

## 2022-09-30 RX ORDER — METFORMIN HYDROCHLORIDE 500 MG/1
TABLET, EXTENDED RELEASE ORAL
Qty: 180 TABLET | Refills: 0 | Status: SHIPPED | OUTPATIENT
Start: 2022-09-30

## 2022-09-30 NOTE — TELEPHONE ENCOUNTER
Refill Request     CONFIRM preferrred pharmacy with the patient. If Mail Order Rx - Pend for 90 day refill. Last Seen: Last Seen Department: 1/12/2022  Last Seen by PCP: 1/12/2022    Last Written: 07/01/2022, 180 tablets, 0 refills        Next Appointment:   No future appointments.           Requested Prescriptions     Pending Prescriptions Disp Refills    metFORMIN (GLUCOPHAGE-XR) 500 MG extended release tablet [Pharmacy Med Name: METFORMIN HCL  MG TABLET] 180 tablet 0     Sig: TAKE ONE TABLET BY MOUTH TWICE A DAY

## 2022-10-05 ENCOUNTER — NURSE ONLY (OUTPATIENT)
Dept: FAMILY MEDICINE CLINIC | Age: 87
End: 2022-10-05
Payer: MEDICARE

## 2022-10-05 DIAGNOSIS — Z23 NEED FOR INFLUENZA VACCINATION: Primary | ICD-10-CM

## 2022-10-05 PROCEDURE — 90694 VACC AIIV4 NO PRSRV 0.5ML IM: CPT | Performed by: NURSE PRACTITIONER

## 2022-10-05 PROCEDURE — G0008 ADMIN INFLUENZA VIRUS VAC: HCPCS | Performed by: NURSE PRACTITIONER

## 2022-11-11 ENCOUNTER — TELEPHONE (OUTPATIENT)
Dept: FAMILY MEDICINE CLINIC | Age: 87
End: 2022-11-11

## 2022-11-11 NOTE — TELEPHONE ENCOUNTER
Called to schedule patients Medicare AW appointment but was unable to leave message as voicemail is not set up. His spouse has a VV Medicare appointment on 11/15 if he calls the office I could do his visit at this same time.

## 2022-12-05 ENCOUNTER — OFFICE VISIT (OUTPATIENT)
Dept: FAMILY MEDICINE CLINIC | Age: 87
End: 2022-12-05
Payer: MEDICARE

## 2022-12-05 VITALS
WEIGHT: 193 LBS | HEIGHT: 65 IN | BODY MASS INDEX: 32.15 KG/M2 | DIASTOLIC BLOOD PRESSURE: 76 MMHG | HEART RATE: 70 BPM | SYSTOLIC BLOOD PRESSURE: 122 MMHG | OXYGEN SATURATION: 100 %

## 2022-12-05 DIAGNOSIS — S31.829A BUTTOCK WOUND, LEFT, INITIAL ENCOUNTER: Primary | ICD-10-CM

## 2022-12-05 DIAGNOSIS — L89.321 PRESSURE INJURY OF LEFT BUTTOCK, STAGE 1: ICD-10-CM

## 2022-12-05 PROCEDURE — 1123F ACP DISCUSS/DSCN MKR DOCD: CPT | Performed by: NURSE PRACTITIONER

## 2022-12-05 PROCEDURE — 99213 OFFICE O/P EST LOW 20 MIN: CPT | Performed by: NURSE PRACTITIONER

## 2022-12-05 RX ORDER — FOAM BANDAGE 8"X5.5"
BANDAGE TOPICAL
Qty: 15 EACH | Refills: 0 | Status: SHIPPED | OUTPATIENT
Start: 2022-12-05

## 2022-12-05 RX ORDER — ZINC OXIDE/COD LIVER OIL 40 %
PASTE (GRAM) TOPICAL
Qty: 99 G | Refills: 0 | Status: SHIPPED | OUTPATIENT
Start: 2022-12-05

## 2022-12-05 ASSESSMENT — ENCOUNTER SYMPTOMS
RESPIRATORY NEGATIVE: 1
COLOR CHANGE: 0

## 2022-12-05 NOTE — PROGRESS NOTES
[facility]  [facility]  [address] [place] [address]  [phone]  Dept: [phone]  Dept Fax: [phone]  Loc: [phone]    Carmen Chavez is a 80 y.o. male who presents today for his medical conditions/complaints as noted below. Carmen Chavez is c/o of Other (Pt states he has a wound on his left butt cheek that has been there for a month and is painful. )        HPI:     Chief Complaint   Patient presents with    Other     Pt states he has a wound on his left butt cheek that has been there for a month and is painful. HPI    Zachariah Cronin presents to the office today to discuss a sore on his bottom that he feels is not healing. He states he has noticed it for about one month. He states the area is sore. He states it is hard for him to sit on his buttocks region because of the pain. He has not placed anything on the wound. He states it does not itch. It is not sore to touch. He has not noticed any drainage. He denies any fever or chills.     Past Medical History:   Diagnosis Date    Arthritis     Bradycardia     OA (osteoarthritis)     PE (pulmonary embolism) 1991    Slow heart rate 2011    had pm    Wears glasses       Past Surgical History:   Procedure Laterality Date    CARDIAC SURGERY  09/20/2011    pacemaker, defib    CARPAL TUNNEL RELEASE  1/10/12    Right    CYST REMOVAL      OFF BACK    EYE SURGERY      bilateral cataracts    OTHER SURGICAL HISTORY Left 12/20/2016    excision of skin cancer of hand    OTHER SURGICAL HISTORY Left 08/15/2017    excision of lesions times 2, left ear    SKIN BIOPSY  5/17/2012    excision lesion behind the right ear       Family History   Problem Relation Age of Onset    Arthritis Father     Cancer Father        Social History     Tobacco Use    Smoking status: Never    Smokeless tobacco: Never   Substance Use Topics    Alcohol use: No      Current Outpatient Medications   Medication Sig Dispense Refill    Diaper Rash Products (Ileana Mullins MULTI-PURPOSE HEALING) OINT Apply to buttocks region two times a day 99 g 0    Silver (AQUACEL AG FOAM/SACRAL) 8\"X7\" PADS Place sacral mepliex border on buttocks and change every 3 days or sooner if soiled 15 each 0    metFORMIN (GLUCOPHAGE-XR) 500 MG extended release tablet TAKE ONE TABLET BY MOUTH TWICE A  tablet 0    diclofenac sodium (VOLTAREN) 1 % GEL APPLY 2 GRAMS TO AFFECTED AREA(S) TWO TIMES A DAY 1 Tube 0    atorvastatin (LIPITOR) 10 MG tablet       albuterol sulfate HFA (VENTOLIN HFA) 108 (90 Base) MCG/ACT inhaler Inhale 2 puffs into the lungs every 6 hours as needed for Wheezing 1 Inhaler 0    diphenhydrAMINE-APAP, sleep, (TYLENOL PM EXTRA STRENGTH)  MG tablet Take 1 tablet by mouth nightly as needed for Sleep      spironolactone (ALDACTONE) 25 MG tablet Take 25 mg by mouth daily      carvedilol (COREG) 12.5 MG tablet Take 12.5 mg by mouth 2 times daily (with meals)      losartan (COZAAR) 25 MG tablet Take 25 mg by mouth daily      aspirin 81 MG tablet Take 81 mg by mouth 2 times daily       therapeutic multivitamin-minerals (THERAGRAN-M) tablet Take 1 tablet by mouth daily. No current facility-administered medications for this visit. Allergies   Allergen Reactions    Lisinopril      Other reaction(s): Other (See Comments)  Cough       :      Review of Systems   Constitutional: Negative. Respiratory: Negative. Cardiovascular: Negative. Skin:  Positive for wound. Negative for color change, pallor and rash. Neurological: Negative. Psychiatric/Behavioral: Negative.          Objective:     Vitals:    12/05/22 1201   BP: 122/76   Site: Right Upper Arm   Position: Sitting   Cuff Size: Medium Adult   Pulse: 70   SpO2: 100%   Weight: 193 lb (87.5 kg)   Height: 5' 5\" (1.651 m)     Wt Readings from Last 3 Encounters:   12/05/22 193 lb (87.5 kg)   08/23/22 209 lb (94.8 kg)   01/04/21 209 lb 3.2 oz (94.9 kg)     Temp Readings from Last 3 Encounters:   01/04/21 97 °F (36.1 °C) (Temporal)   03/16/18 97.7 °F (36.5 °C) (Oral)   08/15/17 98.8 °F (37.1 °C) (Temporal)     BP Readings from Last 3 Encounters:   12/05/22 122/76   01/04/21 120/62   07/16/18 118/68     Pulse Readings from Last 3 Encounters:   12/05/22 70   01/04/21 76   07/16/18 67     Physical Exam  Vitals and nursing note reviewed. Constitutional:       General: He is not in acute distress. Appearance: Normal appearance. He is well-developed. He is obese. He is not diaphoretic. HENT:      Head: Normocephalic and atraumatic. Right Ear: External ear normal.      Left Ear: External ear normal.      Nose: Nose normal.   Eyes:      General:         Right eye: No discharge. Left eye: No discharge. Conjunctiva/sclera: Conjunctivae normal.   Cardiovascular:      Rate and Rhythm: Normal rate. Pulmonary:      Effort: Pulmonary effort is normal.   Musculoskeletal:         General: No deformity. Normal range of motion. Cervical back: Normal range of motion and neck supple. No rigidity. Skin:     General: Skin is warm and dry. Coloration: Skin is not jaundiced or pale. Findings: Wound present. No bruising, erythema, lesion or rash. Neurological:      Mental Status: He is alert and oriented to person, place, and time. Mental status is at baseline. Psychiatric:         Mood and Affect: Mood normal.         Behavior: Behavior normal.         Thought Content: Thought content normal.         Judgment: Judgment normal.       Nurse Only on 01/24/2022   Component Date Value Ref Range Status    Potassium 01/24/2022 5.1  3.5 - 5.1 mmol/L Final           Assessment & Plan: The following diagnoses and conditions are stable with no further orders unless indicated:  1. Buttock wound, left, initial encounter    2. Pressure injury of left buttock, stage 1        Mimi Huerta was seen today for other. Superficial pressure ulcer to left buttocks with surrounding dry and flaky skin.   Recommend him placing Desitin ointment to the buttocks region at least 2 times daily. Recommend sacral Mepilex border to help relieve pressure off of the buttocks to help the wound heal.  Recommend him changing positions often. Recommend him sitting on soft cushions instead of hard cushions such as sitting on a chair with a cushion instead of a hard wooden chair. He verbalizes understanding. Informed him to call the office if his symptoms fail to improve. Wound recheck follow up in one week. He verbalized understanding. Diagnoses and all orders for this visit:    Buttock wound, left, initial encounter  -     Diaper Rash Products ([address]) Angel De La Garza; Apply to buttocks region two times a day  -     Silver (AQUACEL AG FOAM/SACRAL) 8\"X7\" PADS; Place sacral mepliex border on buttocks and change every 3 days or sooner if soiled    Pressure injury of left buttock, stage 1  -     Diaper Rash Products (DESITIN MULTI-PURPOSE HEALING) OINT; Apply to buttocks region two times a day  -     Silver (AQUACEL AG FOAM/SACRAL) 8\"X7\" PADS; Place sacral mepliex border on buttocks and change every 3 days or sooner if soiled    Prior to Visit Medications    Medication Sig Taking?  Authorizing Provider   Diaper Rash Products (DESITIN MULTI-PURPOSE HEALING) OINT Apply to buttocks region two times a day Yes MARIFER Garland CNP   Silver (AQUACEL AG FOAM/SACRAL) 8\"X7\" PADS Place sacral mepliex border on buttocks and change every 3 days or sooner if soiled Yes MARIFER Garland CNP   metFORMIN (GLUCOPHAGE-XR) 500 MG extended release tablet TAKE ONE TABLET BY MOUTH TWICE A DAY Yes MARIFER Gordillo CNP   diclofenac sodium (VOLTAREN) 1 % GEL APPLY 2 GRAMS TO AFFECTED AREA(S) TWO TIMES A DAY Yes Margie Joiner MD   atorvastatin (LIPITOR) 10 MG tablet  Yes Historical Provider, MD   albuterol sulfate HFA (VENTOLIN HFA) 108 (90 Base) MCG/ACT inhaler Inhale 2 puffs into the lungs every 6 hours as needed for Wheezing Yes MARIFER Michelle - CNP   diphenhydrAMINE-APAP, sleep, (TYLENOL PM EXTRA STRENGTH)  MG tablet Take 1 tablet by mouth nightly as needed for Sleep Yes Historical Provider, MD   spironolactone (ALDACTONE) 25 MG tablet Take 25 mg by mouth daily Yes Historical Provider, MD   carvedilol (COREG) 12.5 MG tablet Take 12.5 mg by mouth 2 times daily (with meals) Yes Historical Provider, MD   losartan (COZAAR) 25 MG tablet Take 25 mg by mouth daily Yes Historical Provider, MD   aspirin 81 MG tablet Take 81 mg by mouth 2 times daily  Yes Historical Provider, MD   therapeutic multivitamin-minerals (THERAGRAN-M) tablet Take 1 tablet by mouth daily. Yes Historical Provider, MD     Orders Placed This Encounter   Medications    Diaper Rash Products (DESITIN MULTI-PURPOSE HEALING) OINT     Sig: Apply to buttocks region two times a day     Dispense:  99 g     Refill:  0    Silver (AQUACEL AG FOAM/SACRAL) 8\"X7\" PADS     Sig: Place sacral mepliex border on buttocks and change every 3 days or sooner if soiled     Dispense:  15 each     Refill:  0     Please order for patient if possible. May not be covered by insurance and he may need to pay cash price         Return if symptoms worsen or fail to improve. Patient should call the office immediately with new or ongoing signs or symptoms or worsening, or proceedto the emergency room. No changes in past medical history, past surgical history, social history, or family history were noted during the patient encounter unless specifically listed above. All updates of past medicalhistory, past surgical history, social history, or family history were reviewed personally by me during the office visit. All problems listed in the assessment are stable unless noted otherwise. Medication profilereviewed personally by me during the office visit. Medication side effects and possible impairments from medications were discussed as applicable.      Call if pattern of symptoms change or persists for an extended time. This document was prepared by a combination of typing and transcription through a voice recognition software. All medications have the potential for adverse effects. All medications effect each person differently. Please read and review provided information related to medication. If the medication that you have been prescribed has the potential to cause sedation, do not drive or operate car, truck, or heavy machinery until you know how the medication will effect you. If you experience any adverse effects from the medication, please call the office or report to the emergency department. Learning About Preventing Pressure Injuries  What are pressure injuries? A pressure injury to the skin is caused by constant pressure over a period of time. The constant pressure blocks the blood supply to the skin. This causes skin cells to die and creates a sore. Pressure injuries are also called bedsores. Pressure injuries usually occur over bony areas, such as the hips, lower back, elbows, heels, and shoulders. Pressure injuries can also occur in places where the skin folds over on itself, or where medical equipment presses on the skin, such as when oxygen tubes press on the ears or cheeks. Pressure injuries can range from red areas on the surface of the skin to severe tissue damage that goes deep into muscle and bone. Severe sores are hard to treat and slow to heal. When pressure injuries do not heal properly, problems such as bone, blood, and skin infections can develop. What causes pressure injuries? Things that cause pressure injuries include:  Pressure on the skin and tissues. For example, the sores may happen when a person lies in bed or sits in a wheelchair for a long time. This is the most common cause of pressure injuries. Sliding down in a bed or chair, forcing the skin to fold over itself (shear force).   Being pulled across bed sheets or other surfaces (friction burns). As we get older, our skin gets more thin and dry and less elastic, so it is easier to damage. Poor nutrition and not getting enough fluids make these natural changes in the skin worse. Skin in this condition may easily develop a pressure injury. Skin can also be damaged by sweat, feces, or urine, making pressure injuries more likely and harder to heal.  How can you help prevent pressure injuries? If you are not able to do these things yourself, ask a family member or friend for help. Change position often  In a bed, change position every 2 hours. In a wheelchair or other type of chair, shift your weight every 15 minutes, and give yourself a full relief of weight every hour. For a weight shift, lean forward and to the left and right. Push up out of the chair with your arms. If you have a chair that tilts, use the tilt control to help relieve pressure. For a full relief of weight, stand up or move to another chair or bed if you are able to. Personal care  Check your skin every day, especially around bony areas. When a pressure injury is forming, skin temperature can be different than nearby skin. It might be warmer or cooler. The skin can feel either firmer or softer than the surrounding skin. Keep your skin clean and free of sweat, wound drainage, urine, and feces. Use skin lotions to keep your skin from drying out and cracking. Barrier lotions or creams have ingredients that can act as a shield to help protect the skin from moisture or irritation. Try not to slide or slump across sheets in a chair or bed. And try not to sleep in a recliner chair. Lifestyle choices  Eat healthy foods with plenty of protein to help heal damaged skin and to help new skin grow. Get plenty of fluids. Stay at a healthy weight. Being either overweight or underweight can make pressure injuries more likely. If you smoke, stop. Smoking dries the skin and reduces its blood supply.  If you need help quitting, talk to your doctor about stop-smoking programs and medicines. These can increase your chances of quitting for good. Ask about using cushions or pads  Overlays are special pads you put on top of a mattress. They provide a softer surface that will fit your body's shape better than a regular mattress. Cushions or devices can be used to reduce pressure on certain areas of the body. For example, you can use a \"medical heel pillow\" to help prevent pressure injuries on heels. You can also get cushions for seating surfaces, such as wheelchair seats. Talk with your doctor about cushions and pads. Some products, such as doughnut-type devices, may actually cause pressure injuries or make them worse. Where can you learn more? Go to https://[employer]. org and sign in to your [employer] account. Enter [id number] in the [employer] box to learn more about \"Learning About Preventing Pressure Injuries. \"     If you do not have an account, please click on the \"Sign Up Now\" link. Current as of: March 28, 2022               Content Version: 13.4  © 9481-2705 [employer]. Care instructions adapted under license by [facility] ([facility]). If you have questions about a medical condition or this instruction, always ask your healthcare professional. [address] any warranty or liability for your use of this information.

## 2023-02-16 ENCOUNTER — OFFICE VISIT (OUTPATIENT)
Dept: FAMILY MEDICINE CLINIC | Age: 88
End: 2023-02-16
Payer: MEDICARE

## 2023-02-16 VITALS
OXYGEN SATURATION: 95 % | HEART RATE: 62 BPM | HEIGHT: 65 IN | WEIGHT: 196 LBS | BODY MASS INDEX: 32.65 KG/M2 | DIASTOLIC BLOOD PRESSURE: 64 MMHG | SYSTOLIC BLOOD PRESSURE: 108 MMHG

## 2023-02-16 DIAGNOSIS — R09.89 GLOBUS SENSATION: ICD-10-CM

## 2023-02-16 DIAGNOSIS — K21.9 GASTROESOPHAGEAL REFLUX DISEASE WITHOUT ESOPHAGITIS: Primary | ICD-10-CM

## 2023-02-16 DIAGNOSIS — R14.0 ABDOMINAL BLOATING: ICD-10-CM

## 2023-02-16 DIAGNOSIS — R14.2 BELCHING: ICD-10-CM

## 2023-02-16 PROCEDURE — 1123F ACP DISCUSS/DSCN MKR DOCD: CPT | Performed by: NURSE PRACTITIONER

## 2023-02-16 PROCEDURE — 99213 OFFICE O/P EST LOW 20 MIN: CPT | Performed by: NURSE PRACTITIONER

## 2023-02-16 RX ORDER — PANTOPRAZOLE SODIUM 40 MG/1
40 TABLET, DELAYED RELEASE ORAL
Qty: 30 TABLET | Refills: 1 | Status: SHIPPED | OUTPATIENT
Start: 2023-02-16

## 2023-02-16 SDOH — ECONOMIC STABILITY: INCOME INSECURITY: HOW HARD IS IT FOR YOU TO PAY FOR THE VERY BASICS LIKE FOOD, HOUSING, MEDICAL CARE, AND HEATING?: NOT HARD AT ALL

## 2023-02-16 SDOH — ECONOMIC STABILITY: FOOD INSECURITY: WITHIN THE PAST 12 MONTHS, THE FOOD YOU BOUGHT JUST DIDN'T LAST AND YOU DIDN'T HAVE MONEY TO GET MORE.: NEVER TRUE

## 2023-02-16 SDOH — ECONOMIC STABILITY: HOUSING INSECURITY
IN THE LAST 12 MONTHS, WAS THERE A TIME WHEN YOU DID NOT HAVE A STEADY PLACE TO SLEEP OR SLEPT IN A SHELTER (INCLUDING NOW)?: NO

## 2023-02-16 SDOH — ECONOMIC STABILITY: FOOD INSECURITY: WITHIN THE PAST 12 MONTHS, YOU WORRIED THAT YOUR FOOD WOULD RUN OUT BEFORE YOU GOT MONEY TO BUY MORE.: NEVER TRUE

## 2023-02-16 ASSESSMENT — ENCOUNTER SYMPTOMS
CONSTIPATION: 0
CHANGE IN BOWEL HABIT: 0
NAUSEA: 1
ABDOMINAL PAIN: 0
BLOOD IN STOOL: 0
SWOLLEN GLANDS: 0
RECTAL PAIN: 0
VOICE CHANGE: 1
SORE THROAT: 0
RHINORRHEA: 0
SINUS PAIN: 0
COUGH: 0
SINUS PRESSURE: 0
VOMITING: 0
ANAL BLEEDING: 0
RESPIRATORY NEGATIVE: 1
TROUBLE SWALLOWING: 1
ABDOMINAL DISTENTION: 1
VISUAL CHANGE: 0
DIARRHEA: 0

## 2023-02-16 ASSESSMENT — PATIENT HEALTH QUESTIONNAIRE - PHQ9
SUM OF ALL RESPONSES TO PHQ QUESTIONS 1-9: 0
SUM OF ALL RESPONSES TO PHQ QUESTIONS 1-9: 0
SUM OF ALL RESPONSES TO PHQ9 QUESTIONS 1 & 2: 0
2. FEELING DOWN, DEPRESSED OR HOPELESS: 0
SUM OF ALL RESPONSES TO PHQ QUESTIONS 1-9: 0
SUM OF ALL RESPONSES TO PHQ QUESTIONS 1-9: 0
1. LITTLE INTEREST OR PLEASURE IN DOING THINGS: 0

## 2023-02-16 NOTE — PROGRESS NOTES
[place]  PHYSICIAN PRACTICES  [facility]  [address] [place] [address]  [phone]  Dept: [phone]  Dept Fax: [phone]  Loc: [phone]    Zaria Pascal is a 80 y.o. male who presents today for his medical conditions/complaints as noted below. Zaria Pascal is c/o of Other (Pt has been having gas and feels like when he does eat he feels full and bloated. )        HPI:     Chief Complaint   Patient presents with    Other     Pt has been having gas and feels like when he does eat he feels full and bloated. Bloated  This is a new problem. The current episode started more than 1 month ago (3 to 4 months ago). The problem occurs constantly. The problem has been unchanged. Associated symptoms include anorexia (Lack of appetite) and nausea. Pertinent negatives include no abdominal pain, arthralgias, change in bowel habit, chest pain, chills, congestion, coughing, diaphoresis, fatigue, fever, headaches, joint swelling, myalgias, neck pain, numbness, rash, sore throat, swollen glands, urinary symptoms, vertigo, visual change, vomiting or weakness. Associated symptoms comments: Globulus sensation and postnasal drainage; Frequent throat clearing; hoarse at times; hard time swallowing at times with dry foods . Nothing aggravates the symptoms.  Treatments tried: Nexium 20 mg daily (helped initially, but no longer)       Past Medical History:   Diagnosis Date    Arthritis     Bradycardia     OA (osteoarthritis)     PE (pulmonary embolism) 1991    Slow heart rate 2011    had pm    Wears glasses       Past Surgical History:   Procedure Laterality Date    CARDIAC SURGERY  09/20/2011    pacemaker, defib    CARPAL TUNNEL RELEASE  1/10/12    Right    CYST REMOVAL      OFF BACK    EYE SURGERY      bilateral cataracts    OTHER SURGICAL HISTORY Left 12/20/2016    excision of skin cancer of hand    OTHER SURGICAL HISTORY Left 08/15/2017    excision of lesions times 2, left ear    SKIN BIOPSY  5/17/2012 excision lesion behind the right ear       Family History   Problem Relation Age of Onset    Arthritis Father     Cancer Father        Social History     Tobacco Use    Smoking status: Never    Smokeless tobacco: Never   Substance Use Topics    Alcohol use: No      Current Outpatient Medications   Medication Sig Dispense Refill    pantoprazole (PROTONIX) 40 MG tablet Take 1 tablet by mouth every morning (before breakfast) 30 tablet 1    Diaper Rash Products (DESITIN MULTI-PURPOSE HEALING) OINT Apply to buttocks region two times a day 99 g 0    Silver (AQUACEL AG FOAM/SACRAL) 8\"X7\" PADS Place sacral mepliex border on buttocks and change every 3 days or sooner if soiled 15 each 0    metFORMIN (GLUCOPHAGE-XR) 500 MG extended release tablet TAKE ONE TABLET BY MOUTH TWICE A  tablet 0    diclofenac sodium (VOLTAREN) 1 % GEL APPLY 2 GRAMS TO AFFECTED AREA(S) TWO TIMES A DAY 1 Tube 0    atorvastatin (LIPITOR) 10 MG tablet       albuterol sulfate HFA (VENTOLIN HFA) 108 (90 Base) MCG/ACT inhaler Inhale 2 puffs into the lungs every 6 hours as needed for Wheezing 1 Inhaler 0    diphenhydrAMINE-APAP, sleep, (TYLENOL PM EXTRA STRENGTH)  MG tablet Take 1 tablet by mouth nightly as needed for Sleep      spironolactone (ALDACTONE) 25 MG tablet Take 25 mg by mouth daily      carvedilol (COREG) 12.5 MG tablet Take 12.5 mg by mouth 2 times daily (with meals)      losartan (COZAAR) 25 MG tablet Take 25 mg by mouth daily      aspirin 81 MG tablet Take 81 mg by mouth 2 times daily       therapeutic multivitamin-minerals (THERAGRAN-M) tablet Take 1 tablet by mouth daily. No current facility-administered medications for this visit. Allergies   Allergen Reactions    Lisinopril      Other reaction(s): Other (See Comments)  Cough       :      Review of Systems   Constitutional:  Positive for appetite change (Lack of appetite at times).  Negative for activity change, chills, diaphoresis, fatigue, fever and unexpected weight change. HENT:  Positive for postnasal drip, trouble swallowing (INtermittently with dry foods) and voice change (Hoarse; has to clear throat often). Negative for congestion, rhinorrhea, sinus pressure, sinus pain, sneezing, sore throat and tinnitus. Respiratory: Negative. Negative for cough. Cardiovascular: Negative. Negative for chest pain. Gastrointestinal:  Positive for abdominal distention (Bloating sensation that improved after belching at times), anorexia (Lack of appetite) and nausea. Negative for abdominal pain, anal bleeding, blood in stool, change in bowel habit, constipation, diarrhea, rectal pain and vomiting. Musculoskeletal:  Negative for arthralgias, joint swelling, myalgias and neck pain. Skin: Negative. Negative for rash. Neurological: Negative. Negative for vertigo, weakness, numbness and headaches. Psychiatric/Behavioral: Negative. Objective:     Vitals:    02/16/23 1544   BP: 108/64   Site: Right Upper Arm   Position: Sitting   Cuff Size: Large Adult   Pulse: 62   SpO2: 95%   Weight: 196 lb (88.9 kg)   Height: 5' 5\" (1.651 m)     Wt Readings from Last 3 Encounters:   02/16/23 196 lb (88.9 kg)   12/05/22 193 lb (87.5 kg)   08/23/22 209 lb (94.8 kg)     Temp Readings from Last 3 Encounters:   01/04/21 97 °F (36.1 °C) (Temporal)   03/16/18 97.7 °F (36.5 °C) (Oral)   08/15/17 98.8 °F (37.1 °C) (Temporal)     BP Readings from Last 3 Encounters:   02/16/23 108/64   12/05/22 122/76   01/04/21 120/62     Pulse Readings from Last 3 Encounters:   02/16/23 62   12/05/22 70   01/04/21 76     Physical Exam  Vitals and nursing note reviewed. Constitutional:       General: He is not in acute distress. Appearance: Normal appearance. He is well-developed. He is obese. He is not diaphoretic. HENT:      Head: Normocephalic and atraumatic. Right Ear: Tympanic membrane, ear canal and external ear normal. There is no impacted cerumen.       Left Ear: Tympanic membrane, ear canal and external ear normal. There is no impacted cerumen. Nose: Nose normal. No congestion or rhinorrhea. Mouth/Throat:      Mouth: Mucous membranes are moist.      Pharynx: Oropharynx is clear. No oropharyngeal exudate or posterior oropharyngeal erythema. Eyes:      General: No scleral icterus. Right eye: No discharge. Left eye: No discharge. Conjunctiva/sclera: Conjunctivae normal.   Neck:      Vascular: No carotid bruit. Trachea: No tracheal deviation. Cardiovascular:      Rate and Rhythm: Normal rate and regular rhythm. Pulses: Normal pulses. Heart sounds: Normal heart sounds. No murmur heard. No friction rub. No gallop. Pulmonary:      Effort: Pulmonary effort is normal. No respiratory distress. Breath sounds: Normal breath sounds. No stridor. No wheezing, rhonchi or rales. Chest:      Chest wall: No tenderness. Abdominal:      General: Bowel sounds are normal. There is no distension. Palpations: Abdomen is soft. There is no mass. Tenderness: There is abdominal tenderness in the epigastric area and left upper quadrant. There is no guarding or rebound. Hernia: No hernia is present. Musculoskeletal:         General: No swelling, tenderness, deformity or signs of injury. Normal range of motion. Cervical back: Normal range of motion and neck supple. No rigidity. No muscular tenderness. Right lower leg: No edema. Left lower leg: No edema. Lymphadenopathy:      Cervical: No cervical adenopathy. Skin:     General: Skin is warm and dry. Capillary Refill: Capillary refill takes less than 2 seconds. Coloration: Skin is not jaundiced or pale. Findings: No bruising, erythema, lesion or rash. Neurological:      General: No focal deficit present. Mental Status: He is alert and oriented to person, place, and time. Mental status is at baseline. Cranial Nerves: No cranial nerve deficit. Sensory: No sensory deficit. Motor: No weakness or abnormal muscle tone. Coordination: Coordination normal.      Gait: Gait normal.      Deep Tendon Reflexes: Reflexes are normal and symmetric. Reflexes normal.   Psychiatric:         Mood and Affect: Mood normal.         Behavior: Behavior normal.         Thought Content: Thought content normal.         Judgment: Judgment normal.       Nurse Only on 01/24/2022   Component Date Value Ref Range Status    Potassium 01/24/2022 5.1  3.5 - 5.1 mmol/L Final           Assessment & Plan: The following diagnoses and conditions are stable with no further orders unless indicated:  1. Gastroesophageal reflux disease without esophagitis    2. Abdominal bloating    3. Belching    4. Globus sensation        Jimena Laurent was seen today for other. Concerned that GERD is not well controlled at this time. He is taking over-the-counter Nexium 20 mg daily. Recommend him increase his PPI to pantoprazole 40 mg daily. He is to stop taking over-the-counter Nexium once he starts taking pantoprazole 40 mg daily. Discussed improving diet and not eating before he goes to bed by 2 to 3 hours. He does admit that he has a snack or before he goes to bed. Recommend decreasing caffeine intake, high-fat foods, and spicy foods and foods that are high citric acid. He verbalized understanding. Recommend him following up in about 2 weeks. Did inform him that if he continues to feel like his symptoms are not improving or if he feels that he still having trouble swallowing at times, would recommend him following up with GI at that time. He verbalized understanding. Diagnoses and all orders for this visit:    Gastroesophageal reflux disease without esophagitis  -     pantoprazole (PROTONIX) 40 MG tablet; Take 1 tablet by mouth every morning (before breakfast)    Abdominal bloating  -     pantoprazole (PROTONIX) 40 MG tablet;  Take 1 tablet by mouth every morning (before breakfast)    Belching  -     pantoprazole (PROTONIX) 40 MG tablet; Take 1 tablet by mouth every morning (before breakfast)    Globus sensation  -     pantoprazole (PROTONIX) 40 MG tablet; Take 1 tablet by mouth every morning (before breakfast)    Prior to Visit Medications    Medication Sig Taking? Authorizing Provider   pantoprazole (PROTONIX) 40 MG tablet Take 1 tablet by mouth every morning (before breakfast) Yes MARIFER Gillis CNP   Diaper Rash Products ([address]) OINT Apply to buttocks region two times a day Yes MARIFER Gillis CNP   Silver (AQUACEL AG FOAM/SACRAL) 8\"X7\" PADS Place sacral mepliex border on buttocks and change every 3 days or sooner if soiled Yes MARIFER Gillis CNP   metFORMIN (GLUCOPHAGE-XR) 500 MG extended release tablet TAKE ONE TABLET BY MOUTH TWICE A DAY Yes MARIFER Betancourt CNP   diclofenac sodium (VOLTAREN) 1 % GEL APPLY 2 GRAMS TO AFFECTED AREA(S) TWO TIMES A DAY Yes Yudi Ogden MD   atorvastatin (LIPITOR) 10 MG tablet  Yes Historical Provider, MD   albuterol sulfate HFA (VENTOLIN HFA) 108 (90 Base) MCG/ACT inhaler Inhale 2 puffs into the lungs every 6 hours as needed for Wheezing Yes MARIFER Betancourt CNP   diphenhydrAMINE-APAP, sleep, (TYLENOL PM EXTRA STRENGTH)  MG tablet Take 1 tablet by mouth nightly as needed for Sleep Yes Historical Provider, MD   spironolactone (ALDACTONE) 25 MG tablet Take 25 mg by mouth daily Yes Historical Provider, MD   carvedilol (COREG) 12.5 MG tablet Take 12.5 mg by mouth 2 times daily (with meals) Yes Historical Provider, MD   losartan (COZAAR) 25 MG tablet Take 25 mg by mouth daily Yes Historical Provider, MD   aspirin 81 MG tablet Take 81 mg by mouth 2 times daily  Yes Historical Provider, MD   therapeutic multivitamin-minerals (THERAGRAN-M) tablet Take 1 tablet by mouth daily.  Yes Historical Provider, MD     Orders Placed This Encounter   Medications pantoprazole (PROTONIX) 40 MG tablet     Sig: Take 1 tablet by mouth every morning (before breakfast)     Dispense:  30 tablet     Refill:  1         Return in about 2 weeks (around 3/2/2023) for GERD follow up .    Patient should call the office immediately with new or ongoing signs or symptoms or worsening, or proceedto the emergency room.  No changes in past medical history, past surgical history, social history, or family history were noted during the patient encounter unless specifically listed above.  All updates of past medicalhistory, past surgical history, social history, or family history were reviewed personally by me during the office visit.  All problems listed in the assessment are stable unless noted otherwise.  Medication profilereviewed personally by me during the office visit.  Medication side effects and possible impairments from medications were discussed as applicable.     Call if pattern of symptoms change or persists for an extended time.    This document was prepared by a combination of typing and transcription through a voice recognition software.    All medications have the potential for adverse effects. All medications effect each person differently. Please read and review provided information related to medication. If the medication that you have been prescribed has the potential to cause sedation, do not drive or operate car, truck, or heavy machinery until you know how the medication will effect you. If you experience any adverse effects from the medication, please call the office or report to the emergency department.    GERD- information given to patient  Gastroesophageal reflux disease (GERD) is the backward flow of stomach acid into the esophagus. The esophagus is the tube that leads from your throat to your stomach. A one-way valve prevents the stomach acid from moving up into this tube. When you have GERD, this valve does not close tightly enough.  If you have mild GERD symptoms  including heartburn, you may be able to control the problem with antacids or over-the-counter medicine. Changing your diet, losing weight, and making other lifestyle changes can also help reduce symptoms. Follow-up care is a key part of your treatment and safety. Be sure to make and go to all appointments, and call your doctor if you are having problems. It's also a good idea to know your test results and keep a list of the medicines you take. How can you care for yourself at home? Take your medicines exactly as prescribed. Call your doctor if you think you are having a problem with your medicine. Your doctor may recommend over-the-counter medicine. For mild or occasional indigestion, antacids, such as Tums, Gaviscon, Mylanta, or Maalox, may help. Your doctor also may recommend over-the-counter acid reducers, such as Pepcid AC, Tagamet HB, Zantac 75, or Prilosec. Read and follow all instructions on the label. If you use these medicines often, talk with your doctor. Change your eating habits. It's best to eat several small meals instead of two or three large meals. After you eat, wait 2 to 3 hours before you lie down. Chocolate, mint, and alcohol can make GERD worse. Spicy foods, foods that have a lot of acid (like tomatoes and oranges), and coffee can make GERD symptoms worse in some people. If your symptoms are worse after you eat a certain food, you may want to stop eating that food to see if your symptoms get better. Do not smoke or chew tobacco. Smoking can make GERD worse. If you need help quitting, talk to your doctor about stop-smoking programs and medicines. These can increase your chances of quitting for good. If you have GERD symptoms at night, raise the head of your bed 6 to 8 inches by putting the frame on blocks or placing a foam wedge under the head of your mattress. (Adding extra pillows does not work.)  Do not wear tight clothing around your middle. Lose weight if you need to.  Losing just 5 to 10 pounds can help.

## 2023-03-02 ENCOUNTER — TELEPHONE (OUTPATIENT)
Dept: FAMILY MEDICINE CLINIC | Age: 88
End: 2023-03-02

## 2023-03-02 NOTE — TELEPHONE ENCOUNTER
----- Message from Kacey Elaine sent at 3/2/2023  3:50 PM EST -----  Subject: Message to Provider    QUESTIONS  Information for Provider? The patient called on 3/2/2023. He is asking if   he still needs to come in for his appt on 3/6/2023 with Iveth Jaramillo. He   stated the medicine she put him on is working and he was wondering if she   still wanted to see him. please contact the patient about this request.  ---------------------------------------------------------------------------  --------------  Shola CARR  [phone]; OK to leave message on voicemail  ---------------------------------------------------------------------------  --------------  SCRIPT ANSWERS  Relationship to Patient?  Self

## 2023-03-06 ENCOUNTER — OFFICE VISIT (OUTPATIENT)
Dept: FAMILY MEDICINE CLINIC | Age: 88
End: 2023-03-06
Payer: MEDICARE

## 2023-03-06 VITALS
HEART RATE: 60 BPM | SYSTOLIC BLOOD PRESSURE: 108 MMHG | OXYGEN SATURATION: 97 % | DIASTOLIC BLOOD PRESSURE: 62 MMHG | BODY MASS INDEX: 32.32 KG/M2 | WEIGHT: 194 LBS | HEIGHT: 65 IN

## 2023-03-06 DIAGNOSIS — R14.2 BELCHING: ICD-10-CM

## 2023-03-06 DIAGNOSIS — K21.9 GASTROESOPHAGEAL REFLUX DISEASE WITHOUT ESOPHAGITIS: Primary | ICD-10-CM

## 2023-03-06 DIAGNOSIS — R14.0 ABDOMINAL BLOATING: ICD-10-CM

## 2023-03-06 DIAGNOSIS — R09.89 GLOBUS SENSATION: ICD-10-CM

## 2023-03-06 DIAGNOSIS — Z23 NEED FOR PNEUMOCOCCAL VACCINE: ICD-10-CM

## 2023-03-06 PROCEDURE — 1123F ACP DISCUSS/DSCN MKR DOCD: CPT | Performed by: NURSE PRACTITIONER

## 2023-03-06 PROCEDURE — 99213 OFFICE O/P EST LOW 20 MIN: CPT | Performed by: NURSE PRACTITIONER

## 2023-03-06 PROCEDURE — G0009 ADMIN PNEUMOCOCCAL VACCINE: HCPCS | Performed by: NURSE PRACTITIONER

## 2023-03-06 PROCEDURE — 90677 PCV20 VACCINE IM: CPT | Performed by: NURSE PRACTITIONER

## 2023-03-06 RX ORDER — PANTOPRAZOLE SODIUM 40 MG/1
40 TABLET, DELAYED RELEASE ORAL
Qty: 90 TABLET | Refills: 0 | Status: SHIPPED | OUTPATIENT
Start: 2023-03-06

## 2023-03-06 ASSESSMENT — ENCOUNTER SYMPTOMS
RESPIRATORY NEGATIVE: 1
GASTROINTESTINAL NEGATIVE: 1

## 2023-03-06 NOTE — PATIENT INSTRUCTIONS
You are due COVID-19 vaccine (5th dose)     You are to stay on Pantoprazole 40 mg daily for 8 to 12 weeks and then may do a lower dose

## 2023-03-06 NOTE — PROGRESS NOTES
[place]  PHYSICIAN PRACTICES  [facility]  [address] [place] [address]  [phone]  Dept: [phone]  Dept Fax: [phone]  Loc: [phone]    Carmella Haas is a 7[address] y.o. male who presents today for his medical conditions/complaints as noted below. Carmella Haas is c/o of Gastroesophageal Reflux (Pt is doing much better and is taking the medication every day. )        HPI:     Chief Complaint   Patient presents with    Gastroesophageal Reflux     Pt is doing much better and is taking the medication every day. HPI    Abdullahi presents to the office today to follow up on his bloated sensation and GERD. At his last appointment, he was noted to having lack of appetite and nausea. He felt like he had a globulus sensation, hoarse voice, postnasal drainage, frequent throat clearing, and a hard time swallowing dry foods at times. He was taking Nexium 20 mg daily. His PPI was increased to 40 mg daily and he was told to stop his OTC Nexium. He was instructed not to eat prior to going to bed by 2 to 3 hours as he was having a snack prior to going to bed. Today, Abdullahi has been taking Pantoprazole 40 mg daily. He states he is doing much better. He states he is not having as much bloating sensation. He is not feeling nauseas. He no longer feels like he has to clear his throat frequently or postnasal drainage. He states he is swallowing much better now. He feels like his appetite is doing better. He states he is not belching as much. He admits to still having some postnasal drainage, but not as much as before. He is not eating late at night anymore and feel like this has helped. He is not up-to-date on his Prevnar. He is wanting his Prevnar vaccine today.      Past Medical History:   Diagnosis Date    Arthritis     Bradycardia     OA (osteoarthritis)     PE (pulmonary embolism) 1991    Slow heart rate 2011    had pm    Wears glasses       Past Surgical History:   Procedure Laterality Date CARDIAC SURGERY  09/20/2011    pacemaker, defib    CARPAL TUNNEL RELEASE  1/10/12    Right    CYST REMOVAL      OFF BACK    EYE SURGERY      bilateral cataracts    OTHER SURGICAL HISTORY Left 12/20/2016    excision of skin cancer of hand    OTHER SURGICAL HISTORY Left 08/15/2017    excision of lesions times 2, left ear    SKIN BIOPSY  5/17/2012    excision lesion behind the right ear       Family History   Problem Relation Age of Onset    Arthritis Father     Cancer Father        Social History     Tobacco Use    Smoking status: Never    Smokeless tobacco: Never   Substance Use Topics    Alcohol use: No      Current Outpatient Medications   Medication Sig Dispense Refill    pantoprazole (PROTONIX) 40 MG tablet Take 1 tablet by mouth every morning (before breakfast) Call if doing well for a lower dose when almost complete 90 tablet 0    Diaper Rash Products (DESITIN MULTI-PURPOSE HEALING) OINT Apply to buttocks region two times a day 99 g 0    Silver (AQUACEL AG FOAM/SACRAL) 8\"X7\" PADS Place sacral mepliex border on buttocks and change every 3 days or sooner if soiled 15 each 0    metFORMIN (GLUCOPHAGE-XR) 500 MG extended release tablet TAKE ONE TABLET BY MOUTH TWICE A  tablet 0    diclofenac sodium (VOLTAREN) 1 % GEL APPLY 2 GRAMS TO AFFECTED AREA(S) TWO TIMES A DAY 1 Tube 0    atorvastatin (LIPITOR) 10 MG tablet       albuterol sulfate HFA (VENTOLIN HFA) 108 (90 Base) MCG/ACT inhaler Inhale 2 puffs into the lungs every 6 hours as needed for Wheezing 1 Inhaler 0    diphenhydrAMINE-APAP, sleep, (TYLENOL PM EXTRA STRENGTH)  MG tablet Take 1 tablet by mouth nightly as needed for Sleep      spironolactone (ALDACTONE) 25 MG tablet Take 25 mg by mouth daily      carvedilol (COREG) 12.5 MG tablet Take 12.5 mg by mouth 2 times daily (with meals)      losartan (COZAAR) 25 MG tablet Take 25 mg by mouth daily      aspirin 81 MG tablet Take 81 mg by mouth 2 times daily       therapeutic multivitamin-minerals [facility]) tablet Take 1 tablet by mouth daily. No current facility-administered medications for this visit. Allergies   Allergen Reactions    Lisinopril      Other reaction(s): Other (See Comments)  Cough       :      Review of Systems   Constitutional: Negative. Respiratory: Negative. Cardiovascular: Negative. Gastrointestinal: Negative. Skin: Negative. Neurological: Negative. Psychiatric/Behavioral: Negative. Objective:     Vitals:    03/06/23 1358   BP: 108/62   Site: Right Upper Arm   Position: Sitting   Cuff Size: Medium Adult   Pulse: 60   SpO2: 97%   Weight: 194 lb (88 kg)   Height: 5' 5\" (1.651 m)     Wt Readings from Last 3 Encounters:   03/06/23 194 lb (88 kg)   02/16/23 196 lb (88.9 kg)   12/05/22 193 lb (87.5 kg)     Temp Readings from Last 3 Encounters:   01/04/21 97 °F (36.1 °C) (Temporal)   03/16/18 97.7 °F (36.5 °C) (Oral)   08/15/17 98.8 °F (37.1 °C) (Temporal)     BP Readings from Last 3 Encounters:   03/06/23 108/62   02/16/23 108/64   12/05/22 122/76     Pulse Readings from Last 3 Encounters:   03/06/23 60   02/16/23 62   12/05/22 70     Physical Exam  Vitals and nursing note reviewed. Constitutional:       General: He is not in acute distress. Appearance: Normal appearance. He is well-developed. He is obese. He is not diaphoretic. HENT:      Head: Normocephalic and atraumatic. Right Ear: Tympanic membrane, ear canal and external ear normal. There is no impacted cerumen. Left Ear: Tympanic membrane, ear canal and external ear normal. There is no impacted cerumen. Nose: Nose normal. No congestion or rhinorrhea. Mouth/Throat:      Mouth: Mucous membranes are moist.      Pharynx: No oropharyngeal exudate or posterior oropharyngeal erythema. Eyes:      General:         Right eye: No discharge. Left eye: No discharge. Extraocular Movements: Extraocular movements intact.       Conjunctiva/sclera: Conjunctivae normal. Pupils: Pupils are equal, round, and reactive to light. Cardiovascular:      Rate and Rhythm: Normal rate. Pulmonary:      Effort: Pulmonary effort is normal.   Abdominal:      General: Bowel sounds are normal. There is no distension. Palpations: Abdomen is soft. There is no mass. Tenderness: There is no abdominal tenderness. There is no right CVA tenderness, left CVA tenderness, guarding or rebound. Hernia: No hernia is present. Musculoskeletal:         General: No deformity. Normal range of motion. Cervical back: Normal range of motion and neck supple. No rigidity. Skin:     General: Skin is warm and dry. Coloration: Skin is not jaundiced or pale. Findings: No bruising, erythema, lesion or rash. Neurological:      Mental Status: He is alert and oriented to person, place, and time. Mental status is at baseline. Psychiatric:         Mood and Affect: Mood normal.         Behavior: Behavior normal.         Thought Content: Thought content normal.         Judgment: Judgment normal.       Nurse Only on 01/24/2022   Component Date Value Ref Range Status    Potassium 01/24/2022 5.1  3.5 - 5.1 mmol/L Final           Assessment & Plan: The following diagnoses and conditions are stable with no further orders unless indicated:  1. Gastroesophageal reflux disease without esophagitis    2. Abdominal bloating    3. Globus sensation    4. Belching    5. Need for pneumococcal vaccine        Ofelia Cortez was seen today for gastroesophageal reflux. GERD is well controlled. He may continue taking the pantoprazole 40 mg daily for the next 8 weeks. Will slowly decrease to a lower dose that he may take daily for several weeks and then may stop as long as his GERD continues to be well controlled. He verbalized understanding. 90-day prescription sent to the pharmacy. He is not having any trouble swallowing. He admits to doing much better with the higher dose of the pantoprazole.   Recommend him continuing to work on not eating 2 to 3 hours prior to going to bed and limiting caffeine intake. He verbalizes understanding. Pneumonia vaccine updated today. Diagnoses and all orders for this visit:    Gastroesophageal reflux disease without esophagitis  -     pantoprazole (PROTONIX) 40 MG tablet; Take 1 tablet by mouth every morning (before breakfast) Call if doing well for a lower dose when almost complete    Abdominal bloating  -     pantoprazole (PROTONIX) 40 MG tablet; Take 1 tablet by mouth every morning (before breakfast) Call if doing well for a lower dose when almost complete    Globus sensation  -     pantoprazole (PROTONIX) 40 MG tablet; Take 1 tablet by mouth every morning (before breakfast) Call if doing well for a lower dose when almost complete    Belching  -     pantoprazole (PROTONIX) 40 MG tablet; Take 1 tablet by mouth every morning (before breakfast) Call if doing well for a lower dose when almost complete    Need for pneumococcal vaccine    Prior to Visit Medications    Medication Sig Taking?  Authorizing Provider   pantoprazole (PROTONIX) 40 MG tablet Take 1 tablet by mouth every morning (before breakfast) Call if doing well for a lower dose when almost complete Yes MARIFER Power - CNP   Diaper Rash Products (DESITIN MULTI-PURPOSE HEALING) OINT Apply to buttocks region two times a day Yes Shantel DasilvaariaMARIFER - CNP   Silver (AQUACEL AG FOAM/SACRAL) 8\"X7\" PADS Place sacral mepliex border on buttocks and change every 3 days or sooner if soiled Yes MARIFER Power CNP   metFORMIN (GLUCOPHAGE-XR) 500 MG extended release tablet TAKE ONE TABLET BY MOUTH TWICE A DAY Yes MARIFER Townsend CNP   diclofenac sodium (VOLTAREN) 1 % GEL APPLY 2 GRAMS TO AFFECTED AREA(S) TWO TIMES A DAY Yes Zion Maxwell MD   atorvastatin (LIPITOR) 10 MG tablet  Yes Historical Provider, MD   albuterol sulfate HFA (VENTOLIN HFA) 108 (90 Base) MCG/ACT inhaler Inhale 2 puffs into the lungs every 6 hours as needed for Wheezing Yes Rhett Li APRN - CNP   diphenhydrAMINE-APAP, sleep, (TYLENOL PM EXTRA STRENGTH)  MG tablet Take 1 tablet by mouth nightly as needed for Sleep Yes Historical Provider, MD   spironolactone (ALDACTONE) 25 MG tablet Take 25 mg by mouth daily Yes Historical Provider, MD   carvedilol (COREG) 12.5 MG tablet Take 12.5 mg by mouth 2 times daily (with meals) Yes Historical Provider, MD   losartan (COZAAR) 25 MG tablet Take 25 mg by mouth daily Yes Historical Provider, MD   aspirin 81 MG tablet Take 81 mg by mouth 2 times daily  Yes Historical Provider, MD   therapeutic multivitamin-minerals (THERAGRAN-M) tablet Take 1 tablet by mouth daily. Yes Historical Provider, MD     Orders Placed This Encounter   Medications    pantoprazole (PROTONIX) 40 MG tablet     Sig: Take 1 tablet by mouth every morning (before breakfast) Call if doing well for a lower dose when almost complete     Dispense:  90 tablet     Refill:  0           Return if symptoms worsen or fail to improve. Patient should call the office immediately with new or ongoing signs or symptoms or worsening, or proceedto the emergency room. No changes in past medical history, past surgical history, social history, or family history were noted during the patient encounter unless specifically listed above. All updates of past medicalhistory, past surgical history, social history, or family history were reviewed personally by me during the office visit. All problems listed in the assessment are stable unless noted otherwise. Medication profilereviewed personally by me during the office visit. Medication side effects and possible impairments from medications were discussed as applicable. Call if pattern of symptoms change or persists for an extended time. This document was prepared by a combination of typing and transcription through a voice recognition software.     All medications have the potential for adverse effects. All medications effect each person differently. Please read and review provided information related to medication. If the medication that you have been prescribed has the potential to cause sedation, do not drive or operate car, truck, or heavy machinery until you know how the medication will effect you. If you experience any adverse effects from the medication, please call the office or report to the emergency department.

## 2023-03-07 ENCOUNTER — OFFICE VISIT (OUTPATIENT)
Dept: FAMILY MEDICINE CLINIC | Age: 88
End: 2023-03-07
Payer: MEDICARE

## 2023-03-07 VITALS
HEART RATE: 73 BPM | OXYGEN SATURATION: 91 % | WEIGHT: 195 LBS | SYSTOLIC BLOOD PRESSURE: 114 MMHG | DIASTOLIC BLOOD PRESSURE: 70 MMHG | BODY MASS INDEX: 32.45 KG/M2

## 2023-03-07 DIAGNOSIS — G56.03 BILATERAL CARPAL TUNNEL SYNDROME: Primary | ICD-10-CM

## 2023-03-07 DIAGNOSIS — Z98.890 HISTORY OF CARPAL TUNNEL RELEASE OF BOTH WRISTS: ICD-10-CM

## 2023-03-07 PROCEDURE — 99213 OFFICE O/P EST LOW 20 MIN: CPT | Performed by: FAMILY MEDICINE

## 2023-03-07 PROCEDURE — 1123F ACP DISCUSS/DSCN MKR DOCD: CPT | Performed by: FAMILY MEDICINE

## 2023-03-07 NOTE — PROGRESS NOTES
Chief Complaint   Patient presents with    Numbness     Left thumb        Internal Administration   First Dose COVID-19, MODERNA BLUE border, Primary or Immunocompromised, (age 12y+), IM, 100 mcg/0.5mL  2021   Second Dose COVID-19, MODERNA ESTUARDO border, Primary or Immunocompromised, (age 12y+), IM, 100 mcg/0.5mL   2021       Last COVID Lab No results found for: SARS-COV-2, SARS-COV-2 RNA, SARS-COV-2, SARS-COV-2, SARS-COV-2 BY PCR, SARS-COV-2, SARS-COV-2, SARS-COV-2         Wt Readings from Last 3 Encounters:   23 195 lb (88.5 kg)   23 194 lb (88 kg)   23 196 lb (88.9 kg)     BP Readings from Last 3 Encounters:   23 114/70   23 108/62   23 108/64     Lab Results   Component Value Date    LABA1C 7.2 2022    LABA1C 7.3 2021    LABA1C 7.8 2018         HPI:   Ana Farrar is a 80 y.o. (: 1932) here today for     Numbness of the left thumb and sometimes his right thumb. States its been worse the last 6 weeks. He states he had carpal tunnel surgery years ago- about 25 years ago for it. Informed its possible its carpal tunnel coming back. Discussed that since its likely carpal tunnel coming back informed he could go to see a hand surgeon to get his opinion on what to do for it. [x] Patient has completed an advance directive  [] Patient has NOT completed an advanced directive  [x] Patient has a documented healthcare surrogate  [] Patient does NOT have a documented healthcare surrogate  [] Discussed the importance of establishing and updating an advanced directive. Patient has questions at this time and those were answered. [x] Discussed the importance of establishing and updating an advanced directive. Patient does NOT have questions at this time.     Discussed with: [x] Patient            [] Family             [] Other caregiver    Patient's medications, allergies, past medical, surgical, social and family histories were reviewed and updated as appropriateon 3/7/2023 at 1:47 PM.      Review of Systems  Additional review of systems may be scanned into the mediasection of this medical record. Any responses requiring further intervention were pursued. OBJECTIVE:  Estimated body mass index is 32.45 kg/m² as calculated from the following:    Height as of 3/6/23: 5' 5\" (1.651 m). Weight as of this encounter: 195 lb (88.5 kg). Vitals:    03/07/23 1341   BP: 114/70   Pulse: 73   SpO2: 91%   Weight: 195 lb (88.5 kg)     Physical Exam  Vitals and nursing note reviewed. Constitutional:       General: He is not in acute distress. Appearance: He is well-developed. He is not diaphoretic. HENT:      Head: Normocephalic and atraumatic. Right Ear: External ear normal.      Left Ear: External ear normal.      Nose: Nose normal.   Eyes:      General: Lids are normal. No scleral icterus. Right eye: No discharge. Left eye: No discharge. Pupils: Pupils are equal, round, and reactive to light. Neck:      Thyroid: No thyromegaly. Vascular: No JVD. Cardiovascular:      Rate and Rhythm: Normal rate and regular rhythm. Pulmonary:      Effort: Pulmonary effort is normal. No respiratory distress. Abdominal:      Palpations: Abdomen is soft. There is no hepatomegaly or splenomegaly. Tenderness: There is no abdominal tenderness. Musculoskeletal:      Comments: Decreased muscle mass of the left hand muscle near thumb and right thumb, left is worse than right. Decreased sensation left thumb left index and middle finger. Prior scar bilateral palm. Skin:     General: Skin is warm and dry. Coloration: Skin is not pale. Findings: No erythema or rash. Comments: Turgor normal   Psychiatric:         Behavior: Behavior normal.         Thought Content: Thought content normal.         Judgment: Judgment normal.          ASSESSMENT PLAN        Diagnosis Orders   1.  Bilateral carpal tunnel syndrome  Abril Rivera MD, Hand Surgery (Hand, Wrist, Upper Extremity), [place]      2. History of carpal tunnel release of both wrists  Meir Arana MD, Hand Surgery (Hand, Wrist, Upper Extremity), [place]      The patient does seem to be significantly affected by this. Refer to hand surgery. I did warn him that any intervention or procedure may not fully recover his sensation. He understands but would like to pursue further. Patient should call the office immediately with new or ongoing signs or symptoms or worsening, or proceed to the emergency room. No changes in past medical history, past surgical history, social history, or family history were noted during the patient encounter unless specifically listed above. All updates of past medical history, past surgical history, social history, or family history were reviewed personally by me during the office visit. All problems listed in the assessment are stable unless noted otherwise. Medication profile reviewed personally by me during the visit. Medication side effects and possible impairments from medications were discussed as applicable. Unless stated otherwise, we will continue current medications as listed in the medication review report contained in the patient's medical record. This document was prepared by a combination of typing and transcription through a voice recognition software. Scribe attestation: Johny Staples RN, am scribing for and in the presence of Aniya Salazar MD. Electronically signed by Emma Wilhelm RN on 3/7/2023 at 1:47 PM    Physician attestation:     I, Dr. Marge Ivy, personally performed the services described in this documentation, as scribed by the above signed scribe in my presence, and it is both accurate and complete.  I agree with the ROS and Past Histories independently gathered by the clinical support staff and the remaining scribed note accurately describes my personal service to the patient.       3/7/2023    2:26 PM

## 2023-03-21 ENCOUNTER — OFFICE VISIT (OUTPATIENT)
Dept: ORTHOPEDIC SURGERY | Age: 88
End: 2023-03-21
Payer: MEDICARE

## 2023-03-21 VITALS — RESPIRATION RATE: 18 BRPM | HEIGHT: 65 IN | BODY MASS INDEX: 32.49 KG/M2 | WEIGHT: 195 LBS

## 2023-03-21 DIAGNOSIS — R20.0 NUMBNESS AND TINGLING: Primary | ICD-10-CM

## 2023-03-21 DIAGNOSIS — R20.2 NUMBNESS AND TINGLING: Primary | ICD-10-CM

## 2023-03-21 PROCEDURE — 99213 OFFICE O/P EST LOW 20 MIN: CPT | Performed by: NURSE PRACTITIONER

## 2023-03-21 NOTE — PROGRESS NOTES
in the same distribution bilaterally. Vascular examination reveals normal, good capillary refill, and good color bilaterally  Swelling is mild in the distal volar forearm bilaterally  Muscular strength is clinically appropriate bilaterally. Examination for Carpal Tunnel Syndrome shows Carpal Tunnel Compression Test to be Moderately Positive on the right & Mildly Positive on the left. The patient displays moderate baseline symptoms to potentially confound the exam.  The thenar musculature is mildly atrophied & weakened. Impression:  Mr. Keisha Chaves has developed Carpal Tunnel Syndrome, which is currently exacerbated, and presents requesting further treatment. Plan:  I have had a thorough discussion with Mr. Keisha Chaves regarding the treatment options available for his initially presenting bilateral carpal tunnel syndrome, which is causing him significant symptoms and difficulty. I have outlined for Mr. Keisha Chaves the risk, benefits and consequences of the various treatment modalities, including a reasonable expectation for the long term success of each. We have discussed the likelihood that further, more aggressive treatment may be required for his current presenting condition. Based upon our current discussion and a reasonable understating of the options available to him, Mr. Keisha Chaves has selected to proceed with a conservative plan of treatment consisting of: the use of protective splints, activity modification, and the judicious use of over-the-counter anti-inflammatory medications if allowed by his primary care physician. An appropriately sized Removable Carpal Tunnel Splint was not indicated. Instructions were given regarding splint use and wear as well as suggestions for use of the other modalities were discussed.   I have clearly explained to him that the above outlined treatment plan should not be expected to 'cure' his carpal tunnel syndrome, but we are rather treating the

## 2023-03-23 ENCOUNTER — TELEPHONE (OUTPATIENT)
Dept: FAMILY MEDICINE CLINIC | Age: 88
End: 2023-03-23

## 2023-03-23 DIAGNOSIS — K21.9 GASTROESOPHAGEAL REFLUX DISEASE WITHOUT ESOPHAGITIS: ICD-10-CM

## 2023-03-23 DIAGNOSIS — R14.0 ABDOMINAL BLOATING: ICD-10-CM

## 2023-03-23 DIAGNOSIS — R14.2 BELCHING: ICD-10-CM

## 2023-03-23 DIAGNOSIS — R09.89 GLOBUS SENSATION: ICD-10-CM

## 2023-03-23 NOTE — TELEPHONE ENCOUNTER
Did he take the Pantoprazole 40 mg for a full 8 weeks? If so, can send in lower dose. If no, would recommend Pantoprazole 40 mg daily for at least 8 weeks and then can slowly decrease to 20 mg daily.  Please route back to this provider

## 2023-03-23 NOTE — TELEPHONE ENCOUNTER
----- Message from [facility] sent at 3/23/2023 10:51 AM EDT -----  Subject: Message to Provider    QUESTIONS  Information for Provider? Patient wanted to let the doctor know that he is   still using the prescription for the 30 day supply of the pantoprazole   (PROTONIX) 40 MG tablet. He is now just getting into the 90 day supply. He   said with the regimen the doctor gave him at the appt on 3/6 that he is   doing better. He has been off of it for a few days and is doing good. He   wants to know if he should call the pharmacy and request the 90 day refill   at 40 MG or have the lower dose call in? Send to the [facility] in [place].  ---------------------------------------------------------------------------  --------------  [facility]  [phone]; OK to leave message on voicemail  ---------------------------------------------------------------------------  --------------  SCRIPT ANSWERS  Relationship to Patient?  Self

## 2023-03-24 RX ORDER — PANTOPRAZOLE SODIUM 20 MG/1
20 TABLET, DELAYED RELEASE ORAL DAILY
Qty: 90 TABLET | Refills: 0 | Status: SHIPPED | OUTPATIENT
Start: 2023-03-24

## 2023-03-24 RX ORDER — PANTOPRAZOLE SODIUM 40 MG/1
40 TABLET, DELAYED RELEASE ORAL
Qty: 90 TABLET | Refills: 0 | Status: CANCELLED | OUTPATIENT
Start: 2023-03-24

## 2023-03-24 NOTE — TELEPHONE ENCOUNTER
I understand that the patient wants to stay on the Protonix 40 mg but the Issue is we do not want the patient to stay on the Protonix 40 mg more than 8 weeks.   If he has completed 8 weeks at the 40 mg then we can send a prescription for the Protonix 20 mg to his pharmacy as requested  Please clarify if the patient did complete a full 8 weeks of Protonix 40 mg

## 2023-03-24 NOTE — TELEPHONE ENCOUNTER
Spoke with patient. He wants to stay on the Protonix 40 mg 1 PO qam.  His insurance will cover a 90 day supply. He is completely out of this medication and requesting a 90 day supply sent to [facility] in [place]. I cancelled the one that Adela sent to the [facility] in [place]. [place].   They were not smiley to transfer the Rx to [place]

## 2023-03-24 NOTE — TELEPHONE ENCOUNTER
Patient did complete 8 weeks of the Protonix 40 mg. Pending Protonix 20 mg to be sent to [facility] in [place].   Patient advised on the decrease

## 2023-05-31 ENCOUNTER — TELEPHONE (OUTPATIENT)
Dept: FAMILY MEDICINE CLINIC | Age: 88
End: 2023-05-31

## 2023-05-31 ENCOUNTER — TELEPHONE (OUTPATIENT)
Dept: ORTHOPEDIC SURGERY | Age: 88
End: 2023-05-31

## 2023-05-31 NOTE — TELEPHONE ENCOUNTER
Test Results     Type of Test: EMG  Date of Test: 5/9/23  Location of Test: [facility]  Patient Contact Number: Medardo Romero [phone]    REQUESTING THAT CATALINA RICHTER CALL WITH RESULTS.

## 2023-05-31 NOTE — TELEPHONE ENCOUNTER
Called to schedule Medicare AW appointment patient not in left a message with his son to have him give the office a call back

## 2023-06-01 ENCOUNTER — TELEPHONE (OUTPATIENT)
Dept: ORTHOPEDIC SURGERY | Age: 88
End: 2023-06-01

## 2023-06-01 NOTE — TELEPHONE ENCOUNTER
I called patient's daughter, Horacio Shelton, to review EMG results with her, with the patient's permission. Review of Electrodiagnostic Testing:  Electrodiagnostic Studies performed by another Physician outside of my practice were Personally Reviewed & Interpreted by myself today. Test performed on: 5/9/23    NERVE CONDUCTION STUDY:  RIGHT   Median Nerve: Sensory Latency: NR  Motor Latency: 5.7  Ulnar Nerve:  Conduction Velocity:  42.3    LEFT  Median Nerve: Sensory Latency: NR  Motor Latency: 5.7  Ulnar Nerve:  Conduction Velocity:  46.0    EMG:  RIGHT  Not Performed    LEFT  Abnormal  ABD    My Interpretation: This study is consistent with: Severe RIGHT Median Nerve Entrapment at the Carpal Tunnel, Severe LEFT Median Nerve Entrapment at the Carpal Tunnel, Mild RIGHT Ulnar Nerve Entrapment at the Cubital Tunnel, and Mild LEFT  Ulnar Nerve Entrapment at the Cubital Tunnel      I discussed treatment options with [facility]. They are considering diagnostic cortisone carpal tunnel injections vs surgery. Patient to follow up with Dr. Kiara Zamora to discuss further.     Kenneth Nicholson, APRN - CNP

## 2023-06-19 RX ORDER — PANTOPRAZOLE SODIUM 20 MG/1
20 TABLET, DELAYED RELEASE ORAL DAILY
Qty: 90 TABLET | Refills: 0 | Status: SHIPPED | OUTPATIENT
Start: 2023-06-19

## 2023-06-19 NOTE — TELEPHONE ENCOUNTER
Last appt 3/7/2023, no appt scheduled  Routing to [facility] due to PCP out of office

## 2023-06-20 ENCOUNTER — TELEPHONE (OUTPATIENT)
Dept: FAMILY MEDICINE CLINIC | Age: 88
End: 2023-06-20

## 2023-06-20 NOTE — TELEPHONE ENCOUNTER
Pt's insurance care coordinator called and she said that she noticed that pt had not filled his metformin for awhile. So she gave him a call and he told her that he was taking 1/2 tab of the 500 mg BID. The script was suppose to be take it 1 tab by mouth BID. She was just wanting to let you know.

## 2023-07-24 ENCOUNTER — OFFICE VISIT (OUTPATIENT)
Dept: ORTHOPEDIC SURGERY | Age: 88
End: 2023-07-24
Payer: MEDICARE

## 2023-07-24 VITALS — WEIGHT: 195 LBS | BODY MASS INDEX: 32.49 KG/M2 | HEIGHT: 65 IN | RESPIRATION RATE: 16 BRPM

## 2023-07-24 DIAGNOSIS — G56.03 BILATERAL CARPAL TUNNEL SYNDROME: Primary | ICD-10-CM

## 2023-07-24 PROCEDURE — 20526 THER INJECTION CARP TUNNEL: CPT | Performed by: ORTHOPAEDIC SURGERY

## 2023-07-24 PROCEDURE — 99204 OFFICE O/P NEW MOD 45 MIN: CPT | Performed by: ORTHOPAEDIC SURGERY

## 2023-07-24 RX ORDER — TRIAMCINOLONE ACETONIDE 40 MG/ML
40 INJECTION, SUSPENSION INTRA-ARTICULAR; INTRAMUSCULAR ONCE
Status: COMPLETED | OUTPATIENT
Start: 2023-07-24 | End: 2023-07-24

## 2023-07-24 RX ADMIN — TRIAMCINOLONE ACETONIDE 40 MG: 40 INJECTION, SUSPENSION INTRA-ARTICULAR; INTRAMUSCULAR at 16:07

## 2023-07-24 NOTE — PROGRESS NOTES
Mr. Laina Heller is a 80 y.o. right handed man  who is seen today in Hand Surgical Consultation at the request of Kavita Hilario MD.    He presents today regarding Bilateral symptoms which have been present for approximately 20+ years. A history of antecedent trauma or injury is Absent. He reports symptoms to include severe numbness & tingling in the Median Innervated Digits. Hand symptoms do not Frequently awaken him from sleep. He reports mild pain located in the palmar both wrists. Symptoms are worsening over time. His family reports that he had SEVERE carpal tunnel syndrome previously treated with surgery which was not entirely helpful    Previous treatment has included Prior Surgical release by Reid Sanchez. Patience Wilson MD  of the both Carpal Tunnels 20+ years ago which relieved his symptoms A Little. Sandipkirill Chung He does not claim relation of his symptoms to his required work activities. He has undergone electrodiagnostic testing. I have today reviewed with Laina Heller the clinically relevant, past medical history, medications, allergies,  family history, social history, and Review Of Systems & I have documented any details relevant to today's presenting complaints in my history above. Mr. Malachi Nur self-reported past medical history, medications, allergies,  family history, social history, and Review Of Systems have been scanned into the chart under the \"Media\" tab. Physical Exam:  Mr. Malachi Nur most recent vitals:  Vitals  Respirations: 16  Height: 5' 5\" (165.1 cm)  Weight - Scale: 195 lb (88.5 kg)    He is well nourished, oriented to person, place & time. He demonstrates appropriate mood and affect as well as normal gait and station.     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

## 2023-07-24 NOTE — PROGRESS NOTES
Administrations This Visit       triamcinolone acetonide (KENALOG-40) injection 40 mg       Admin Date  07/24/2023  16:07 Action  Given Dose  40 mg Route  Other Site  Other Administered By  Scout Tavares MA    Ordering Provider: Caitlin Rodriguez MD    NDC: 4393-1567-55    Lot#: [id number]    : [employer] (PRIMARY CARE)    Patient Supplied?: No    Comments: Left Hand      Admin Date  07/24/2023  16:07 Action  Given Dose  40 mg Route  Other Site  Other Administered By  Scout Tavares MA    Ordering Provider: Caitlin Rodriguez MD    NDC: 0249-5891-48    Lot#: [id number]    : [employer] (PRIMARY CARE)    Patient Supplied?: No    Comments: Right Hand

## 2023-07-26 ENCOUNTER — TELEPHONE (OUTPATIENT)
Dept: PHARMACY | Facility: CLINIC | Age: 88
End: 2023-07-26

## 2023-07-26 NOTE — TELEPHONE ENCOUNTER
POPULATION HEALTH CLINICAL PHARMACY: ADHERENCE REVIEW  Identified care gap per Cathy: fills at [facility]: Diabetes adherence      [facility] [id number] - Michael Montague [address] [place] [phone]  [address] [place] [address]  Phone: [phone] Fax: [phone]      Patient also appears to be prescribed: ACE/ARB, Diabetes, and Statin     Current Outpatient Medications   Medication Instructions    albuterol sulfate HFA (VENTOLIN HFA) 108 (90 Base) MCG/ACT inhaler 2 puffs, Inhalation, EVERY 6 HOURS PRN    aspirin 81 mg, Oral, 2 TIMES DAILY    atorvastatin (LIPITOR) 10 MG tablet No dose, route, or frequency recorded. carvedilol (COREG) 12.5 mg, Oral, 2 TIMES DAILY WITH MEALS    Diaper Rash Products (DESITIN MULTI-PURPOSE HEALING) OINT Apply to buttocks region two times a day    diclofenac sodium (VOLTAREN) 1 % GEL APPLY 2 GRAMS TO AFFECTED AREA(S) TWO TIMES A DAY    diphenhydrAMINE-APAP, sleep, (TYLENOL PM EXTRA STRENGTH)  MG tablet 1 tablet, Oral, NIGHTLY PRN    losartan (COZAAR) 25 mg, Oral, DAILY    metFORMIN (GLUCOPHAGE-XR) 500 MG extended release tablet TAKE ONE TABLET BY MOUTH TWICE A DAY    pantoprazole (PROTONIX) 20 mg, Oral, DAILY    Silver (AQUACEL AG FOAM/SACRAL) 8\"X7\" PADS Place sacral mepliex border on buttocks and change every 3 days or sooner if soiled    spironolactone (ALDACTONE) 25 mg, Oral, DAILY    therapeutic multivitamin-minerals (THERAGRAN-M) tablet 1 tablet, Oral, DAILY         ASSESSMENT    DIABETES ADHERENCE  Insurance Records claims through 7/17/23  YTD [address] = FIRST FILL; Potential Fail Date: 8/6/23:   Metformin  mg last filled for 90 days supply on 3/10/23. Next refill due: 6/8/23    Per Reconciled Dispense Report:    METFORMIN ER 500MG GP TAB 03/10/2023 90 180 tablet MARIFER Pérez - CNP [facility] [id number]. .. METFORMIN ER 500MG GP TAB 10/03/2022 90 180 tablet MARIFER Pérez - CNP [facility] [id number]. ..

## 2023-08-18 ENCOUNTER — TELEPHONE (OUTPATIENT)
Dept: PHARMACY | Facility: CLINIC | Age: 88
End: 2023-08-18

## 2023-08-18 NOTE — TELEPHONE ENCOUNTER
Kendra Yo MD - Metformin Dose Update    During a recent adherence outreach (see 7/26/23 pharmacy encounter), the following was noted after chart review:  Patient is cutting 500 mg tablets of metformin in half and taking 250 mg twice daily (not 500 mg twice daily, as prescribed). Per [facility] 6/20/23 telephone encounter:  Pt's insurance care coordinator called and she said that she noticed that pt had not filled his metformin for awhile. So she gave him a call and he told her that he was taking 1/2 tab of the 500 mg BID. The script was suppose to be take it 1 tab by mouth BID. She was just wanting to let you know. Can metformin prescription be updated to reflect dose the patient is currently taking and sent to pharmacy for patient's next refill? As patient is taking metformin extended release (cannot cut tablets), may consider a change to 500 mg extended release once daily with the evening meal, if appropriate for patient.     LOV: 3/7/23  Next: QUIN    Thank you,  Woody Ballesteros, PharmD, [facility]  Department, toll free: [phone], option 1

## 2023-08-23 NOTE — TELEPHONE ENCOUNTER
No provider response to request to update metformin prescription. I updated the [facility] medication list using patient \"taking differently\".       Woody Ballesteros, PharmD, [facility]  Department, toll free: [phone], option 1      For Pharmacy Admin Tracking Only  Program: Ari in place:  No  Recommendation Provided To: Provider: 1 via Note to Provider  Intervention Detail: Adherence Monitorin and New Rx: 1, reason: Improve Adherence, Patient Preference  Intervention Accepted By: Provider: 0  Gap Closed?: No   Time Spent (min): 30

## 2023-10-09 RX ORDER — METFORMIN HYDROCHLORIDE 500 MG/1
500 TABLET, EXTENDED RELEASE ORAL 2 TIMES DAILY
Qty: 180 TABLET | Refills: 0 | Status: SHIPPED | OUTPATIENT
Start: 2023-10-09

## 2023-10-09 NOTE — TELEPHONE ENCOUNTER
Refill Request     CONFIRM preferrred pharmacy with the patient. If Mail Order Rx - Pend for 90 day refill. Last Seen: Last Seen Department: 6/14/2023  Last Seen by PCP: 6/14/2023    Last Written: 9/30/22    If no future appointment scheduled, route STAFF MESSAGE with patient name to the [facility] for scheduling. Next Appointment:   No future appointments. Message sent to [facility] to schedule appt with patient?   N/A      Requested Prescriptions     Pending Prescriptions Disp Refills    metFORMIN (GLUCOPHAGE-XR) 500 MG extended release tablet 180 tablet 0     Sig: Take 1 tablet by mouth 2 times daily

## 2023-10-20 RX ORDER — PANTOPRAZOLE SODIUM 20 MG/1
20 TABLET, DELAYED RELEASE ORAL DAILY
Qty: 90 TABLET | Refills: 3 | Status: SHIPPED | OUTPATIENT
Start: 2023-10-20

## 2023-10-20 NOTE — TELEPHONE ENCOUNTER
Refill Request     CONFIRM preferrred pharmacy with the patient. If Mail Order Rx - Pend for 90 day refill. Last Seen: Last Seen Department: 6/14/2023  Last Seen by PCP: Visit date not found    Last Written: 6-    If no future appointment scheduled, route STAFF MESSAGE with patient name to the [facility] for scheduling. Next Appointment:   No future appointments. Message sent to [address] [place] [address] to schedule appt with patient?   N/A      Requested Prescriptions     Pending Prescriptions Disp Refills    pantoprazole (PROTONIX) 20 MG tablet [Pharmacy Med Name: PANTOPRAZOLE SOD DR 20 MG TAB] 90 tablet 0     Sig: TAKE 1 TABLET BY MOUTH DAILY

## 2024-01-05 RX ORDER — METFORMIN HYDROCHLORIDE 500 MG/1
500 TABLET, EXTENDED RELEASE ORAL 2 TIMES DAILY
Qty: 180 TABLET | Refills: 0 | Status: SHIPPED | OUTPATIENT
Start: 2024-01-05

## 2024-01-05 NOTE — TELEPHONE ENCOUNTER
Refill Request     CONFIRM preferrred pharmacy with the patient.    If Mail Order Rx - Pend for 90 day refill.      Last Seen: Last Seen Department: 6/14/2023  Last Seen by PCP: 6/14/2023    Last Written: 10-9-2023    If no future appointment scheduled, route STAFF MESSAGE with patient name to the  Pool for scheduling.      Next Appointment:   No future appointments.    Message sent to  to schedule appt with patient?  YES      Requested Prescriptions     Pending Prescriptions Disp Refills    metFORMIN (GLUCOPHAGE-XR) 500 MG extended release tablet [Pharmacy Med Name: METFORMIN HCL  MG TABLET] 180 tablet 0     Sig: TAKE 1 TABLET BY MOUTH TWICE A DAY

## 2024-01-11 ENCOUNTER — HOSPITAL ENCOUNTER (OUTPATIENT)
Age: 89
Discharge: HOME | End: 2024-01-11
Payer: MEDICARE

## 2024-01-11 LAB
ALBUMIN SERPL-MCNC: 3.7 G/DL (ref 3.4–5)
ALP SERPL-CCNC: 130 U/L (ref 40–129)
ALT SERPL-CCNC: 13 U/L (ref 10–40)
ANION GAP SERPL CALCULATED.3IONS-SCNC: 11 MMOL/L (ref 3–16)
AST SERPL-CCNC: 21 U/L (ref 15–37)
BILIRUB DIRECT SERPL-MCNC: <0.2 MG/DL (ref 0–0.3)
BILIRUB INDIRECT SERPL-MCNC: ABNORMAL MG/DL (ref 0–1)
BILIRUB SERPL-MCNC: 0.7 MG/DL (ref 0–1)
BUN SERPL-MCNC: 24 MG/DL (ref 7–20)
CALCIUM SERPL-MCNC: 9.6 MG/DL (ref 8.3–10.6)
CHLORIDE SERPL-SCNC: 107 MMOL/L (ref 99–110)
CO2 SERPL-SCNC: 23 MMOL/L (ref 21–32)
CREAT SERPL-MCNC: 1.3 MG/DL (ref 0.8–1.3)
GFR SERPLBLD CREATININE-BSD FMLA CKD-EPI: 52 ML/MIN/{1.73_M2}
GLUCOSE SERPL-MCNC: 120 MG/DL (ref 70–99)
POTASSIUM SERPL-SCNC: 5.1 MMOL/L (ref 3.5–5.1)
PROT SERPL-MCNC: 6.8 G/DL (ref 6.4–8.2)
SODIUM SERPL-SCNC: 141 MMOL/L (ref 136–145)

## 2024-01-11 PROCEDURE — 80076 HEPATIC FUNCTION PANEL: CPT

## 2024-01-11 PROCEDURE — 80061 LIPID PANEL: CPT

## 2024-01-11 PROCEDURE — 80048 BASIC METABOLIC PNL TOTAL CA: CPT

## 2024-01-12 LAB
CHOLEST SERPL-MCNC: 98 MG/DL (ref 0–199)
HDLC SERPL-MCNC: 38 MG/DL (ref 40–60)
LDLC SERPL CALC-MCNC: 47 MG/DL
TRIGL SERPL-MCNC: 65 MG/DL (ref 0–150)
VLDLC SERPL CALC-MCNC: 13 MG/DL

## 2024-02-14 ENCOUNTER — TELEPHONE (OUTPATIENT)
Dept: FAMILY MEDICINE CLINIC | Age: 89
End: 2024-02-14

## 2024-02-14 NOTE — TELEPHONE ENCOUNTER
[phone] called and she was wanting to let you know what the pt told the pharmacist. He told them that he had stopped the metformin completely that it caused him to have bad GI effects.

## 2024-03-11 ENCOUNTER — HOSPITAL ENCOUNTER (OUTPATIENT)
Age: 89
Discharge: HOME | End: 2024-03-11
Payer: MEDICARE

## 2024-03-11 ENCOUNTER — OFFICE VISIT (OUTPATIENT)
Dept: FAMILY MEDICINE CLINIC | Age: 89
End: 2024-03-11
Payer: MEDICARE

## 2024-03-11 ENCOUNTER — HOSPITAL ENCOUNTER (OUTPATIENT)
Dept: GENERAL RADIOLOGY | Age: 89
Discharge: HOME | End: 2024-03-11
Payer: MEDICARE

## 2024-03-11 VITALS
HEART RATE: 76 BPM | DIASTOLIC BLOOD PRESSURE: 67 MMHG | BODY MASS INDEX: 32.82 KG/M2 | OXYGEN SATURATION: 93 % | WEIGHT: 197.2 LBS | SYSTOLIC BLOOD PRESSURE: 103 MMHG

## 2024-03-11 DIAGNOSIS — L85.8 CUTANEOUS HORN: ICD-10-CM

## 2024-03-11 DIAGNOSIS — M54.50 ACUTE MIDLINE LOW BACK PAIN WITHOUT SCIATICA: ICD-10-CM

## 2024-03-11 DIAGNOSIS — M54.50 ACUTE MIDLINE LOW BACK PAIN WITHOUT SCIATICA: Primary | ICD-10-CM

## 2024-03-11 DIAGNOSIS — E11.69 TYPE 2 DIABETES MELLITUS WITH OTHER SPECIFIED COMPLICATION, UNSPECIFIED WHETHER LONG TERM INSULIN USE (HCC): ICD-10-CM

## 2024-03-11 PROCEDURE — 1123F ACP DISCUSS/DSCN MKR DOCD: CPT | Performed by: FAMILY MEDICINE

## 2024-03-11 PROCEDURE — 72100 X-RAY EXAM L-S SPINE 2/3 VWS: CPT

## 2024-03-11 PROCEDURE — 72070 X-RAY EXAM THORAC SPINE 2VWS: CPT

## 2024-03-11 PROCEDURE — 99213 OFFICE O/P EST LOW 20 MIN: CPT | Performed by: FAMILY MEDICINE

## 2024-03-11 SDOH — ECONOMIC STABILITY: FOOD INSECURITY: WITHIN THE PAST 12 MONTHS, YOU WORRIED THAT YOUR FOOD WOULD RUN OUT BEFORE YOU GOT MONEY TO BUY MORE.: NEVER TRUE

## 2024-03-11 SDOH — ECONOMIC STABILITY: INCOME INSECURITY: HOW HARD IS IT FOR YOU TO PAY FOR THE VERY BASICS LIKE FOOD, HOUSING, MEDICAL CARE, AND HEATING?: NOT HARD AT ALL

## 2024-03-11 SDOH — ECONOMIC STABILITY: FOOD INSECURITY: WITHIN THE PAST 12 MONTHS, THE FOOD YOU BOUGHT JUST DIDN'T LAST AND YOU DIDN'T HAVE MONEY TO GET MORE.: NEVER TRUE

## 2024-03-11 ASSESSMENT — PATIENT HEALTH QUESTIONNAIRE - PHQ9
2. FEELING DOWN, DEPRESSED OR HOPELESS: 0
1. LITTLE INTEREST OR PLEASURE IN DOING THINGS: 0
SUM OF ALL RESPONSES TO PHQ9 QUESTIONS 1 & 2: 0
SUM OF ALL RESPONSES TO PHQ QUESTIONS 1-9: 0

## 2024-03-13 RX ORDER — METHYLPREDNISOLONE 4 MG/1
TABLET ORAL
Qty: 1 KIT | Refills: 0 | Status: SHIPPED | OUTPATIENT
Start: 2024-03-13 | End: 2024-03-19

## 2024-03-13 RX ORDER — METHOCARBAMOL 500 MG/1
500 TABLET, FILM COATED ORAL 3 TIMES DAILY PRN
Qty: 30 TABLET | Refills: 0 | Status: SHIPPED | OUTPATIENT
Start: 2024-03-13 | End: 2024-03-23

## 2024-03-26 ENCOUNTER — OFFICE VISIT (OUTPATIENT)
Dept: FAMILY MEDICINE CLINIC | Age: 89
End: 2024-03-26
Payer: MEDICARE

## 2024-03-26 VITALS — HEART RATE: 88 BPM | DIASTOLIC BLOOD PRESSURE: 72 MMHG | SYSTOLIC BLOOD PRESSURE: 112 MMHG | OXYGEN SATURATION: 90 %

## 2024-03-26 DIAGNOSIS — L89.150 PRESSURE INJURY OF COCCYGEAL REGION, UNSTAGEABLE (HCC): ICD-10-CM

## 2024-03-26 DIAGNOSIS — D49.2 NEOPLASM OF UNSPECIFIED BEHAVIOR OF BONE, SOFT TISSUE, AND SKIN: ICD-10-CM

## 2024-03-26 DIAGNOSIS — R20.9 SKIN SENSATION DISTURBANCE: ICD-10-CM

## 2024-03-26 DIAGNOSIS — L81.9 PIGMENTED SKIN LESION SUSPICIOUS FOR MALIGNANT NEOPLASM: Primary | ICD-10-CM

## 2024-03-26 DIAGNOSIS — S22.080A COMPRESSION FRACTURE OF T12 VERTEBRA, INITIAL ENCOUNTER (HCC): ICD-10-CM

## 2024-03-26 PROCEDURE — 99213 OFFICE O/P EST LOW 20 MIN: CPT | Performed by: FAMILY MEDICINE

## 2024-03-26 PROCEDURE — 1123F ACP DISCUSS/DSCN MKR DOCD: CPT | Performed by: FAMILY MEDICINE

## 2024-03-26 PROCEDURE — 11441 EXC FACE-MM B9+MARG 0.6-1 CM: CPT | Performed by: FAMILY MEDICINE

## 2024-03-26 RX ORDER — METHYLPREDNISOLONE 4 MG/1
TABLET ORAL
Qty: 1 KIT | Refills: 0 | Status: SHIPPED | OUTPATIENT
Start: 2024-03-26

## 2024-03-26 RX ORDER — OSTOMY ADHESIVE
STRIP MISCELLANEOUS
Qty: 15 EACH | Refills: 3 | Status: SHIPPED | OUTPATIENT
Start: 2024-03-26 | End: 2024-03-26

## 2024-03-26 RX ORDER — METHOCARBAMOL 500 MG/1
500 TABLET, FILM COATED ORAL 3 TIMES DAILY
Qty: 30 TABLET | Refills: 0 | Status: SHIPPED | OUTPATIENT
Start: 2024-03-26 | End: 2024-04-05

## 2024-03-26 RX ORDER — OSTOMY ADHESIVE
STRIP MISCELLANEOUS
Qty: 15 EACH | Refills: 3 | Status: SHIPPED | OUTPATIENT
Start: 2024-03-26

## 2024-03-26 NOTE — PROGRESS NOTES
daily Yes Provider, MD Yesi   aspirin 81 MG tablet Take 1 tablet by mouth 2 times daily Yes Provider, MD Yesi   therapeutic multivitamin-minerals (THERAGRAN-M) tablet Take 1 tablet by mouth daily Yes Provider, MD Yesi     Allergies   Allergen Reactions    Lisinopril      Other reaction(s): Other (See Comments)  Cough       OBJECTIVE:  Estimated body mass index is 32.82 kg/m² as calculated from the following:    Height as of 7/24/23: 1.651 m (5' 5\").    Weight as of 3/11/24: 89.4 kg (197 lb 3.2 oz).  Vitals:    03/26/24 1054   BP: 112/72   Pulse: 88   SpO2: 90%       Physical Exam  Vitals and nursing note reviewed.   Constitutional:       Appearance: He is well-developed.   HENT:      Head: Normocephalic and atraumatic.     Cardiovascular:      Rate and Rhythm: Normal rate.   Pulmonary:      Effort: Pulmonary effort is normal.   Skin:     Findings: Lesion (on the right ear) present.   Neurological:      Mental Status: He is alert and oriented to person, place, and time.   Psychiatric:         Behavior: Behavior normal.         Thought Content: Thought content normal.         Judgment: Judgment normal.       PROCEDURE NOTE    Written consent obtained.  Patient and site confirmed.  A 3 mm lesion indicated for removal by shave excision.   Indication for removal suspicious lesion.  0.8 milliliters of Xylocaine without epinephrine administered.  Shave excision performed.  Hemostasis by electrocautery.  Wound closure necessary no.  Pathology sent yes.  Wound care instructions.   Signs and symptoms of infection discussed.  Return to office as needed.         ASSESSMENT PLAN      Diagnosis Orders   1. Pigmented skin lesion suspicious for malignant neoplasm  SURGICAL PATHOLOGY      2. Compression fracture of T12 vertebra, initial encounter ([facility])  methylPREDNISolone (MEDROL DOSEPACK) 4 MG tablet    methocarbamol (ROBAXIN) 500 MG tablet    MRI THORACIC SPINE WO CONTRAST      3. Pressure injury of coccygeal

## 2024-03-29 ENCOUNTER — HOSPITAL ENCOUNTER (OUTPATIENT)
Dept: NUCLEAR MEDICINE | Age: 89
Discharge: HOME | End: 2024-03-29
Attending: FAMILY MEDICINE
Payer: MEDICARE

## 2024-03-29 DIAGNOSIS — S22.080A COMPRESSION FRACTURE OF T12 VERTEBRA, INITIAL ENCOUNTER (HCC): ICD-10-CM

## 2024-03-29 PROCEDURE — 78830 RP LOCLZJ TUM SPECT W/CT 1: CPT

## 2024-03-29 PROCEDURE — A9503 TC99M MEDRONATE: HCPCS | Performed by: FAMILY MEDICINE

## 2024-03-29 PROCEDURE — 3430000000 HC RX DIAGNOSTIC RADIOPHARMACEUTICAL: Performed by: FAMILY MEDICINE

## 2024-03-29 RX ORDER — TC 99M MEDRONATE 20 MG/10ML
26 INJECTION, POWDER, LYOPHILIZED, FOR SOLUTION INTRAVENOUS
Status: COMPLETED | OUTPATIENT
Start: 2024-03-29 | End: 2024-03-29

## 2024-03-29 RX ADMIN — TC 99M MEDRONATE 26 MILLICURIE: 20 INJECTION, POWDER, LYOPHILIZED, FOR SOLUTION INTRAVENOUS at 11:06

## 2024-04-01 ENCOUNTER — TELEPHONE (OUTPATIENT)
Dept: ORTHOPEDIC SURGERY | Age: 89
End: 2024-04-01

## 2024-04-01 DIAGNOSIS — S22.080A COMPRESSION FRACTURE OF T12 VERTEBRA, INITIAL ENCOUNTER (HCC): Primary | ICD-10-CM

## 2024-04-01 NOTE — TELEPHONE ENCOUNTER
Appointment Request     Patient requesting earlier appointment: Yes  Appointment offered to patient: OFFERED 4/17/24. THIS IS A COMPRESSION FX. WANT TO KNOW IF THIS APPT IS TOO FAR OUT TO BE CONSIDERED FOR KYPHOPLASTY. PLS CALL DAUGHTER VEGA  Patient Contact Number: [phone]

## 2024-04-02 ENCOUNTER — OFFICE VISIT (OUTPATIENT)
Dept: ORTHOPEDIC SURGERY | Age: 89
End: 2024-04-02
Payer: MEDICARE

## 2024-04-02 VITALS — BODY MASS INDEX: 32.82 KG/M2 | HEIGHT: 65 IN | WEIGHT: 197 LBS

## 2024-04-02 DIAGNOSIS — S22.080A COMPRESSION FRACTURE OF T12 VERTEBRA, INITIAL ENCOUNTER (HCC): Primary | ICD-10-CM

## 2024-04-02 PROCEDURE — 99214 OFFICE O/P EST MOD 30 MIN: CPT | Performed by: PHYSICIAN ASSISTANT

## 2024-04-02 PROCEDURE — 1123F ACP DISCUSS/DSCN MKR DOCD: CPT | Performed by: PHYSICIAN ASSISTANT

## 2024-04-02 RX ORDER — TRAMADOL HYDROCHLORIDE 50 MG/1
50 TABLET ORAL EVERY 6 HOURS PRN
Qty: 28 TABLET | Refills: 0 | Status: SHIPPED | OUTPATIENT
Start: 2024-04-02 | End: 2024-04-09

## 2024-04-02 NOTE — PROGRESS NOTES
New Patient: Thoracic SPINE    Referring Provider:  No ref. provider found    CHIEF COMPLAINT:    Chief Complaint   Patient presents with    Back Pain     thoracic       HISTORY OF PRESENT ILLNESS:       Mr. Oneil Ma  is a pleasant 89 y.o. male, here with his daughter ,for evaluation regarding his thoracic spine pain. He states his pain began after a fall in his home approximately 5 weeks ago.  Patient has a history of chronic low back pain but states that after the fall his pain significantly increased.  He was seen by his primary care physician, Dr. Mendoza, who did order diagnostic studies which showed an age-indeterminate T12 compression fracture.  This was followed up with a nuclear medicine SPECT CT scan of the thoracic spine which did reveal the T12 compression fracture to be acute.  Patient states that the pain now is a 8/10 which is constant but does not interfere with his sleep at night.  Patient finds that standing and walking has been difficult due to pain in his back but also due to end-stage osteoarthritis within both knees.  He denies any numbness and tingling into his lower extremities but does have a generalized weakness within his legs.  He denies any new bowel or bladder dysfunction or saddle anesthesia.  He does find that prolonged sitting and standing is worse than lying down.  He is presently taking Tylenol and Robaxin for the pain.   Pain Assessment  Location of Pain: Back  Location Modifiers: Other (Comment)  Severity of Pain: 8  Quality of Pain: Other (Comment)  Duration of Pain: Other (Comment)  Frequency of Pain: Other (Comment)]  Current/Past Treatment:   Physical Therapy: None  Chiropractic: None  Injection: None  Medications: Tylenol and Robaxin    Past Medical History:   Past Medical History:   Diagnosis Date    Arthritis     Bradycardia     OA (osteoarthritis)     PE (pulmonary embolism) 1991    Slow heart rate 2011    had pm    Wears glasses         Past Surgical History:

## 2024-04-30 ENCOUNTER — OFFICE VISIT (OUTPATIENT)
Dept: ORTHOPEDIC SURGERY | Age: 89
End: 2024-04-30
Payer: MEDICARE

## 2024-04-30 VITALS — BODY MASS INDEX: 32.82 KG/M2 | HEIGHT: 65 IN | WEIGHT: 197 LBS

## 2024-04-30 DIAGNOSIS — S22.080A COMPRESSION FRACTURE OF T12 VERTEBRA, INITIAL ENCOUNTER (HCC): Primary | ICD-10-CM

## 2024-04-30 PROCEDURE — 99214 OFFICE O/P EST MOD 30 MIN: CPT | Performed by: PHYSICIAN ASSISTANT

## 2024-04-30 PROCEDURE — 1123F ACP DISCUSS/DSCN MKR DOCD: CPT | Performed by: PHYSICIAN ASSISTANT

## 2024-04-30 NOTE — PROGRESS NOTES
Follow-up: Thoracic SPINE    Referring Provider:  No ref. provider found    CHIEF COMPLAINT:    Chief Complaint   Patient presents with    Back Pain     LUMBAR       HISTORY OF PRESENT ILLNESS:       Mr. Oneil Ma  is a pleasant 89 y.o. male, here with his daughter ,for follow-up evaluation regarding his T12 compression fracture.  Patient states that he is now been in his [place] brace for almost 4 weeks and he has been relatively comfortable in the brace.  Pain Assessment  Location of Pain: Back  Location Modifiers: Other (Comment)  Severity of Pain: 8  Quality of Pain: Other (Comment)  Duration of Pain: Other (Comment)  Frequency of Pain: Other (Comment)]    4/2/2024   He states his pain began after a fall in his home approximately 5 weeks ago.  Patient has a history of chronic low back pain but states that after the fall his pain significantly increased.  He was seen by his primary care physician, Dr. Mendoza, who did order diagnostic studies which showed an age-indeterminate T12 compression fracture.  This was followed up with a nuclear medicine SPECT CT scan of the thoracic spine which did reveal the T12 compression fracture to be acute.  Patient states that the pain now is a 8/10 which is constant but does not interfere with his sleep at night.  Patient finds that standing and walking has been difficult due to pain in his back but also due to end-stage osteoarthritis within both knees.  He denies any numbness and tingling into his lower extremities but does have a generalized weakness within his legs.  He denies any new bowel or bladder dysfunction or saddle anesthesia.  He does find that prolonged sitting and standing is worse than lying down.  He is presently taking Tylenol and Robaxin for the pain.   Pain Assessment  Location of Pain: Back  Location Modifiers: Other (Comment)  Severity of Pain: 8  Quality of Pain: Other (Comment)  Duration of Pain: Other (Comment)  Frequency of Pain: Other

## 2024-05-28 ENCOUNTER — OFFICE VISIT (OUTPATIENT)
Dept: ORTHOPEDIC SURGERY | Age: 89
End: 2024-05-28
Payer: MEDICARE

## 2024-05-28 VITALS — HEIGHT: 65 IN | BODY MASS INDEX: 32.82 KG/M2 | WEIGHT: 197 LBS

## 2024-05-28 DIAGNOSIS — S22.080A COMPRESSION FRACTURE OF T12 VERTEBRA, INITIAL ENCOUNTER (HCC): Primary | ICD-10-CM

## 2024-05-28 PROCEDURE — 99213 OFFICE O/P EST LOW 20 MIN: CPT | Performed by: PHYSICIAN ASSISTANT

## 2024-05-28 PROCEDURE — 1123F ACP DISCUSS/DSCN MKR DOCD: CPT | Performed by: PHYSICIAN ASSISTANT

## 2024-05-28 NOTE — PROGRESS NOTES
Follow-up: Thoracic SPINE    Referring Provider:  No ref. provider found    CHIEF COMPLAINT:    No chief complaint on file.      HISTORY OF PRESENT ILLNESS:       Mr. Oneil Ma  is a pleasant 89 y.o. male, here with his daughter ,for follow-up evaluation regarding his T12 compression fracture.  Patient has been in his [place] brace now for approximately 8 weeks.  The brace is ill fitting and we will have the brace remolded to be more conforming.  He continues to complain of low back pain which is somewhat improved with Tylenol or ibuprofen.  Pain Assessment  Location of Pain: Back  Location Modifiers: Other (Comment)  Severity of Pain: 9  Quality of Pain: Other (Comment)  Duration of Pain: Other (Comment)]    4/30/2024   Patient states that he is now been in his [place] brace for almost 4 weeks and he has been relatively comfortable in the brace.  His pain has been well-controlled with the [place] brace even though he continues to complain of low back pain.   ]    4/2/2024   He states his pain began after a fall in his home approximately 5 weeks ago.  Patient has a history of chronic low back pain but states that after the fall his pain significantly increased.  He was seen by his primary care physician, Dr. Mendoza, who did order diagnostic studies which showed an age-indeterminate T12 compression fracture.  This was followed up with a nuclear medicine SPECT CT scan of the thoracic spine which did reveal the T12 compression fracture to be acute.  Patient states that the pain now is a 8/10 which is constant but does not interfere with his sleep at night.  Patient finds that standing and walking has been difficult due to pain in his back but also due to end-stage osteoarthritis within both knees.  He denies any numbness and tingling into his lower extremities but does have a generalized weakness within his legs.  He denies any new bowel or bladder dysfunction or saddle anesthesia.  He does find that prolonged

## 2024-06-18 DIAGNOSIS — S22.080A COMPRESSION FRACTURE OF T12 VERTEBRA, INITIAL ENCOUNTER (HCC): ICD-10-CM

## 2024-06-18 RX ORDER — METHYLPREDNISOLONE 4 MG/1
TABLET ORAL
Qty: 21 TABLET | Refills: 1 | Status: SHIPPED | OUTPATIENT
Start: 2024-06-18

## 2024-07-02 ENCOUNTER — OFFICE VISIT (OUTPATIENT)
Dept: ORTHOPEDIC SURGERY | Age: 89
End: 2024-07-02
Payer: MEDICARE

## 2024-07-02 VITALS — BODY MASS INDEX: 32.82 KG/M2 | WEIGHT: 197 LBS | HEIGHT: 65 IN

## 2024-07-02 DIAGNOSIS — M47.816 LUMBAR FACET ARTHROPATHY: ICD-10-CM

## 2024-07-02 DIAGNOSIS — M51.36 DDD (DEGENERATIVE DISC DISEASE), LUMBAR: ICD-10-CM

## 2024-07-02 DIAGNOSIS — S22.080A COMPRESSION FRACTURE OF T12 VERTEBRA, INITIAL ENCOUNTER (HCC): Primary | ICD-10-CM

## 2024-07-02 PROCEDURE — 1123F ACP DISCUSS/DSCN MKR DOCD: CPT | Performed by: PHYSICIAN ASSISTANT

## 2024-07-02 PROCEDURE — 99213 OFFICE O/P EST LOW 20 MIN: CPT | Performed by: PHYSICIAN ASSISTANT

## 2024-07-02 RX ORDER — TRAMADOL HYDROCHLORIDE 50 MG/1
50 TABLET ORAL 2 TIMES DAILY
Qty: 60 TABLET | Refills: 0 | Status: SHIPPED | OUTPATIENT
Start: 2024-07-02 | End: 2024-08-01

## 2024-07-02 NOTE — PROGRESS NOTES
place and person.   Mood & Affect:The patient's mood and affect are appropriate.  Vascular: Examination reveals no swelling tenderness in upper or lower extremities. Good capillary refill.  Lymphatic: The lymphatic examination bilaterally reveals all areas to be without enlargement or induration  Sensation: Sensation is intact without deficit  Coordination/Balance: Good coordination.     Thoracic spine EXAMINATION:  Inspection: Local inspection shows no step-off or bruising.  Thoracic alignment is scoliotic.  Sagittal and Coronal balance is neutral.      Palpation:   Diffuse tenderness at the midline.  No tenderness bilaterally at the paraspinal or trochanters.  There is no step-off or paraspinal spasm.   Range of Motion: Thoracic flexion, extension and rotation are  limited due to pain.  Strength:   Strength testing is 5/5 in all muscle groups tested.   Special Tests:   Straight leg raise and crossed SLR negative.  Leg length and pelvis level.   Skin: There are no rashes, ulcerations or lesions.  Reflexes: Reflexes are symmetrically 2+ at the patellar and ankle tendons.  Clonus absent bilaterally at the feet.  Gait & station: Patient presents in a wheelchair but uses a walker for ambulation    Additional Examinations:   RIGHT LOWER EXTREMITY: Inspection/examination of the right lower extremity does not show any tenderness, deformity or injury. Range of motion is unremarkable. There is no gross instability.  There are no rashes, ulcerations or lesions. Strength and tone are normal.  LEFT LOWER EXTREMITY:  Inspection/examination of the left lower extremity does not show any tenderness, deformity or injury. Range of motion is unremarkable. There is no gross instability. There are no rashes, ulcerations or lesions.  Strength and tone are normal.    Diagnostic Testing:    X-rays: X-rays of the thoracolumbar spine that were obtained in the office today and independently reviewed with the patient and his daughter show the

## 2024-08-05 ENCOUNTER — TELEPHONE (OUTPATIENT)
Dept: FAMILY MEDICINE CLINIC | Age: 89
End: 2024-08-05

## 2024-08-05 NOTE — TELEPHONE ENCOUNTER
----- Message from Gabby Lutz sent at 8/2/2024  8:10 AM EDT -----  Regarding: [facility] Appointment Request  [facility] Appointment Request    Patient needs appointment for [facility] Appointment Type: Existing Condition Follow Up.    Patient Requested Dates(s):August 6  Patient Requested Time:3:00pm  Provider Name:   Shaun Mendoza MD        Reason for Appointment Request: Established Patient - Available appointments did not meet patient need the patient's wife cancelled her appointment on August 6-3:00pm due to the wife is currently at the facility and the patient wanted to have the appointment instead. Please call the daughter for further assistance. Thank you  --------------------------------------------------------------------------------------------------------------------------    Relationship to Patient: Spouse/Partner daughter- Patria   [place]    Call Back Information: OK to leave message on voicemail  Preferred Call Back Number: Phone [phone]

## 2024-08-05 NOTE — TELEPHONE ENCOUNTER
Called lvm with daughter informing that the requested time slot has already been filled and to call back and we can schedule patient for the next available time slot

## 2024-10-01 ENCOUNTER — OFFICE VISIT (OUTPATIENT)
Dept: ORTHOPEDIC SURGERY | Age: 89
End: 2024-10-01
Payer: MEDICARE

## 2024-10-01 VITALS — BODY MASS INDEX: 32.82 KG/M2 | RESPIRATION RATE: 14 BRPM | HEIGHT: 65 IN | WEIGHT: 197 LBS

## 2024-10-01 DIAGNOSIS — G56.03 BILATERAL CARPAL TUNNEL SYNDROME: Primary | ICD-10-CM

## 2024-10-01 PROCEDURE — 20526 THER INJECTION CARP TUNNEL: CPT | Performed by: ORTHOPAEDIC SURGERY

## 2024-10-01 PROCEDURE — 1123F ACP DISCUSS/DSCN MKR DOCD: CPT | Performed by: ORTHOPAEDIC SURGERY

## 2024-10-01 PROCEDURE — 99214 OFFICE O/P EST MOD 30 MIN: CPT | Performed by: ORTHOPAEDIC SURGERY

## 2024-10-01 RX ORDER — LIDOCAINE HYDROCHLORIDE 10 MG/ML
1 INJECTION, SOLUTION INFILTRATION; PERINEURAL ONCE
Status: COMPLETED | OUTPATIENT
Start: 2024-10-01 | End: 2024-10-01

## 2024-10-01 RX ORDER — TRIAMCINOLONE ACETONIDE 40 MG/ML
40 INJECTION, SUSPENSION INTRA-ARTICULAR; INTRAMUSCULAR ONCE
Status: COMPLETED | OUTPATIENT
Start: 2024-10-01 | End: 2024-10-01

## 2024-10-01 RX ADMIN — LIDOCAINE HYDROCHLORIDE 1 ML: 10 INJECTION, SOLUTION INFILTRATION; PERINEURAL at 12:42

## 2024-10-01 RX ADMIN — TRIAMCINOLONE ACETONIDE 40 MG: 40 INJECTION, SUSPENSION INTRA-ARTICULAR; INTRAMUSCULAR at 12:43

## 2024-10-01 NOTE — PROGRESS NOTES
We last examined this patient 1+ year ago at which time Dr. TJ Hernandez injected the bilateral wrists for treatment of carpal tunnel syndrome. He obtained prompt and complete relief of all symptoms.  Unfortunately, the condition has recently recurred on the left only and the patient returns to the office requesting additional treatment.  He complains of pain, swelling, paresthesias and stiffness of the hand for the past several weeks.  Symptoms have become worse recently.    The patient's social history, past medical history, family history, medications, allergies and review of systems have been reviewed, dated 10/1/24 and are recorded in the chart.    I have personally examined and reviewed all previous imaging studies, laboratory tests(BMP, Lipid Panel, Hepatic Function Panel) and medical encounters pertinent to this patient's visit today.      On physical examination there is moderate left volar wrist swelling.  There is severe bilateral thenar muscle atrophy.  The Tinel sign is negative.  The Phalen test is positive.  Range of motion of the fingers, thumbs and wrists is full bilaterally.  There is hypalgesia in the distribution of the median nerve.  Two point discrimination is abnormal.  Distal circulation is intact.  All joints are stable.  There are no subcutaneous nodules or enlarged epitrochlear lymph nodes.     Bilateral chronic severe carpal tunnel syndrome.     The nature of this medical problem is fully discussed with the patient and his daughter, as well as all treatment options, including the pertinent risks, complications, prognosis and post treatment care.  All questions are answered. The left wrist is prepared with Betadine and alcohol and the carpal tunnel is injected with 2 milliliters of a mixture of 1 ml of 1% lidocaine and 40 mg. of Kenalog.  The patient is instructed regarding the expected response and possible reactions to the injection as well as appropriate post injection care.  Acetaminophen

## 2024-11-08 RX ORDER — PANTOPRAZOLE SODIUM 20 MG/1
20 TABLET, DELAYED RELEASE ORAL DAILY
Qty: 90 TABLET | Refills: 0 | Status: SHIPPED | OUTPATIENT
Start: 2024-11-08

## 2024-11-08 NOTE — TELEPHONE ENCOUNTER
Refill Request     CONFIRM preferrred pharmacy with the patient.    If Mail Order Rx - Pend for 90 day refill.      Last Seen: Last Seen Department: 3/26/2024  Last Seen by PCP: Visit date not found    Last Written: 8/28/2024    If no future appointment scheduled, route STAFF MESSAGE with patient name to the  Pool for scheduling.      Next Appointment:   No future appointments.    Message sent to  to schedule appt with patient?  NO      Requested Prescriptions     Pending Prescriptions Disp Refills    pantoprazole (PROTONIX) 20 MG tablet [Pharmacy Med Name: PANTOPRAZOLE SOD DR 20 MG TAB] 90 tablet 3     Sig: TAKE 1 TABLET BY MOUTH DAILY

## 2025-01-21 ENCOUNTER — E-VISIT (OUTPATIENT)
Dept: FAMILY MEDICINE CLINIC | Age: 89
End: 2025-01-21

## 2025-01-21 DIAGNOSIS — J01.90 ACUTE BACTERIAL SINUSITIS: Primary | ICD-10-CM

## 2025-01-21 DIAGNOSIS — J20.9 ACUTE BRONCHITIS DUE TO INFECTION: ICD-10-CM

## 2025-01-21 DIAGNOSIS — B96.89 ACUTE BACTERIAL SINUSITIS: Primary | ICD-10-CM

## 2025-01-21 RX ORDER — BENZONATATE 100 MG/1
100-200 CAPSULE ORAL 3 TIMES DAILY PRN
Qty: 60 CAPSULE | Refills: 0 | Status: SHIPPED | OUTPATIENT
Start: 2025-01-21 | End: 2025-01-31

## 2025-01-21 RX ORDER — METHYLPREDNISOLONE 4 MG/1
TABLET ORAL
Qty: 1 KIT | Refills: 0 | Status: SHIPPED | OUTPATIENT
Start: 2025-01-21

## 2025-01-21 ASSESSMENT — LIFESTYLE VARIABLES: SMOKING_STATUS: NO, I'VE NEVER SMOKED

## 2025-01-21 NOTE — PROGRESS NOTES
HPI: as per patient provided history  Exam: N/A (electronic visit)  ASSESSMENT/PLAN:  1. Acute bacterial sinusitis  - amoxicillin-clavulanate (AUGMENTIN) 875-125 MG per tablet; Take 1 tablet by mouth 2 times daily for 7 days  Dispense: 14 tablet; Refill: 0    2. Acute bronchitis due to infection  - benzonatate (TESSALON) 100 MG capsule; Take 1-2 capsules by mouth 3 times daily as needed for Cough  Dispense: 60 capsule; Refill: 0  - methylPREDNISolone (MEDROL DOSEPACK) 4 MG tablet; Use as directed per package instructions  Dispense: 1 kit; Refill: 0       Patient instructed to call the office if worsens, or fails to improve as anticipated.    11-20 minutes were spent on the digital evaluation and management of this patient.    Sinusitis: Care Instructions  Your Care Instructions    Sinusitis is an infection of the lining of the sinus cavities in your head. Sinusitis often follows a cold. It causes pain and pressure in your head and face.  In most cases, sinusitis gets better on its own in 1 to 2 weeks. But some mild symptoms may last for several weeks. Sometimes antibiotics are needed.  Follow-up care is a key part of your treatment and safety. Be sure to make and go to all appointments, and call your doctor if you are having problems. It's also a good idea to know your test results and keep a list of the medicines you take.  How can you care for yourself at home?  Take an over-the-counter pain medicine, such as acetaminophen (Tylenol), ibuprofen (Advil, Motrin), or naproxen (Aleve). Read and follow all instructions on the label.  If the doctor prescribed antibiotics, take them as directed. Do not stop taking them just because you feel better. You need to take the full course of antibiotics.  Be careful when taking over-the-counter cold or flu medicines and Tylenol at the same time. Many of these medicines have acetaminophen, which is Tylenol. Read the labels to make sure that you are not taking more than the

## 2025-02-06 RX ORDER — PANTOPRAZOLE SODIUM 20 MG/1
20 TABLET, DELAYED RELEASE ORAL DAILY
Qty: 90 TABLET | Refills: 0 | Status: SHIPPED | OUTPATIENT
Start: 2025-02-06

## 2025-02-06 NOTE — TELEPHONE ENCOUNTER
Refill Request     CONFIRM preferrred pharmacy with the patient.    If Mail Order Rx - Pend for 90 day refill.      Last Seen: Last Seen Department: 1/21/2025  Last Seen by PCP: Visit date not found    Last Written: 11/10/2024    If no future appointment scheduled, route STAFF MESSAGE with patient name to the  Pool for scheduling.      Next Appointment:   No future appointments.    Message sent to  to schedule appt with patient?  NO      Requested Prescriptions     Pending Prescriptions Disp Refills    pantoprazole (PROTONIX) 20 MG tablet [Pharmacy Med Name: PANTOPRAZOLE SOD DR 20 MG TAB] 90 tablet 0     Sig: TAKE 1 TABLET BY MOUTH DAILY

## 2025-03-17 ENCOUNTER — E-VISIT (OUTPATIENT)
Dept: FAMILY MEDICINE CLINIC | Age: 89
End: 2025-03-17
Payer: MEDICARE

## 2025-03-17 DIAGNOSIS — N39.0 ACUTE UTI: Primary | ICD-10-CM

## 2025-03-17 PROCEDURE — 99422 OL DIG E/M SVC 11-20 MIN: CPT | Performed by: NURSE PRACTITIONER

## 2025-03-17 RX ORDER — CIPROFLOXACIN 250 MG/1
250 TABLET, FILM COATED ORAL 2 TIMES DAILY
Qty: 14 TABLET | Refills: 0 | Status: SHIPPED | OUTPATIENT
Start: 2025-03-17 | End: 2025-03-18 | Stop reason: ALTCHOICE

## 2025-03-17 NOTE — PROGRESS NOTES
HPI: as per patient provided history  Exam: N/A (electronic visit)  ASSESSMENT/PLAN:  1. Acute UTI  - ciprofloxacin (CIPRO) 250 MG tablet; Take 1 tablet by mouth 2 times daily for 7 days  Dispense: 14 tablet; Refill: 0       Patient instructed to call the office if worsens, or fails to improve as anticipated.    11-20 minutes were spent on the digital evaluation and management of this patient.

## 2025-03-18 ENCOUNTER — APPOINTMENT (OUTPATIENT)
Dept: GENERAL RADIOLOGY | Age: 89
DRG: 683 | End: 2025-03-18
Payer: MEDICARE

## 2025-03-18 ENCOUNTER — APPOINTMENT (OUTPATIENT)
Dept: CT IMAGING | Age: 89
DRG: 683 | End: 2025-03-18
Payer: MEDICARE

## 2025-03-18 ENCOUNTER — HOSPITAL ENCOUNTER (INPATIENT)
Age: 89
LOS: 3 days | Discharge: NURSING, REHAB OR LONG-TERM CARE | DRG: 683 | End: 2025-03-21
Attending: EMERGENCY MEDICINE | Admitting: STUDENT IN AN ORGANIZED HEALTH CARE EDUCATION/TRAINING PROGRAM
Payer: MEDICARE

## 2025-03-18 DIAGNOSIS — R62.7 FAILURE TO THRIVE IN ADULT: ICD-10-CM

## 2025-03-18 DIAGNOSIS — E11.65 HYPERGLYCEMIA DUE TO DIABETES MELLITUS (HCC): ICD-10-CM

## 2025-03-18 DIAGNOSIS — I50.22 CHRONIC HEART FAILURE WITH REDUCED EJECTION FRACTION (HFREF, <= 40%) (HCC): ICD-10-CM

## 2025-03-18 DIAGNOSIS — N32.89 DISTENDED BLADDER: ICD-10-CM

## 2025-03-18 DIAGNOSIS — N28.9 ACUTE RENAL INSUFFICIENCY: Primary | ICD-10-CM

## 2025-03-18 DIAGNOSIS — E87.5 HYPERKALEMIA: ICD-10-CM

## 2025-03-18 DIAGNOSIS — E86.0 DEHYDRATION: ICD-10-CM

## 2025-03-18 DIAGNOSIS — N30.01 ACUTE CYSTITIS WITH HEMATURIA: ICD-10-CM

## 2025-03-18 PROBLEM — N30.80 EMPHYSEMATOUS CYSTITIS: Status: ACTIVE | Noted: 2025-03-18

## 2025-03-18 LAB
ALBUMIN SERPL-MCNC: 2.7 G/DL (ref 3.4–5)
ALBUMIN/GLOB SERPL: 0.6 {RATIO} (ref 1.1–2.2)
ALP SERPL-CCNC: 125 U/L (ref 40–129)
ALT SERPL-CCNC: 11 U/L (ref 10–40)
ANION GAP SERPL CALCULATED.3IONS-SCNC: 10 MMOL/L (ref 3–16)
AST SERPL-CCNC: 14 U/L (ref 15–37)
BACTERIA URNS QL MICRO: ABNORMAL /HPF
BASOPHILS # BLD: 0.1 K/UL (ref 0–0.2)
BASOPHILS NFR BLD: 0.6 %
BILIRUB SERPL-MCNC: 0.3 MG/DL (ref 0–1)
BILIRUB UR QL STRIP.AUTO: ABNORMAL
BUN SERPL-MCNC: 49 MG/DL (ref 7–20)
CALCIUM SERPL-MCNC: 9 MG/DL (ref 8.3–10.6)
CHLORIDE SERPL-SCNC: 101 MMOL/L (ref 99–110)
CLARITY UR: CLEAR
CO2 SERPL-SCNC: 21 MMOL/L (ref 21–32)
COLOR UR: ABNORMAL
CREAT SERPL-MCNC: 2.6 MG/DL (ref 0.8–1.3)
DEPRECATED RDW RBC AUTO: 13.9 % (ref 12.4–15.4)
EKG ATRIAL RATE: 61 BPM
EKG DIAGNOSIS: NORMAL
EKG P AXIS: 63 DEGREES
EKG P-R INTERVAL: 148 MS
EKG Q-T INTERVAL: 476 MS
EKG QRS DURATION: 144 MS
EKG QTC CALCULATION (BAZETT): 479 MS
EKG R AXIS: 243 DEGREES
EKG T AXIS: 54 DEGREES
EKG VENTRICULAR RATE: 61 BPM
EOSINOPHIL # BLD: 0.3 K/UL (ref 0–0.6)
EOSINOPHIL NFR BLD: 2.7 %
FLUAV RNA RESP QL NAA+PROBE: NOT DETECTED
FLUBV RNA RESP QL NAA+PROBE: NOT DETECTED
GFR SERPLBLD CREATININE-BSD FMLA CKD-EPI: 22 ML/MIN/{1.73_M2}
GLUCOSE BLD-MCNC: 249 MG/DL (ref 70–99)
GLUCOSE SERPL-MCNC: 311 MG/DL (ref 70–99)
GLUCOSE UR STRIP.AUTO-MCNC: 100 MG/DL
HCT VFR BLD AUTO: 36.1 % (ref 40.5–52.5)
HGB BLD-MCNC: 11.8 G/DL (ref 13.5–17.5)
HGB UR QL STRIP.AUTO: ABNORMAL
INR PPP: 1.08 (ref 0.85–1.15)
KETONES UR STRIP.AUTO-MCNC: 15 MG/DL
LACTATE BLDV-SCNC: 1.7 MMOL/L (ref 0.4–2)
LEUKOCYTE ESTERASE UR QL STRIP.AUTO: ABNORMAL
LIPASE SERPL-CCNC: 17 U/L (ref 13–60)
LYMPHOCYTES # BLD: 1.4 K/UL (ref 1–5.1)
LYMPHOCYTES NFR BLD: 13.2 %
MCH RBC QN AUTO: 31.6 PG (ref 26–34)
MCHC RBC AUTO-ENTMCNC: 32.7 G/DL (ref 31–36)
MCV RBC AUTO: 96.7 FL (ref 80–100)
MONOCYTES # BLD: 1.3 K/UL (ref 0–1.3)
MONOCYTES NFR BLD: 11.8 %
NEUTROPHILS # BLD: 7.7 K/UL (ref 1.7–7.7)
NEUTROPHILS NFR BLD: 71.7 %
NITRITE UR QL STRIP.AUTO: POSITIVE
NT-PROBNP SERPL-MCNC: 1204 PG/ML (ref 0–449)
PERFORMED ON: ABNORMAL
PH UR STRIP.AUTO: 5 [PH] (ref 5–8)
PLATELET # BLD AUTO: 458 K/UL (ref 135–450)
PMV BLD AUTO: 7.8 FL (ref 5–10.5)
POTASSIUM SERPL-SCNC: 6.1 MMOL/L (ref 3.5–5.1)
PROCALCITONIN SERPL IA-MCNC: 0.3 NG/ML (ref 0–0.15)
PROT SERPL-MCNC: 6.9 G/DL (ref 6.4–8.2)
PROT UR STRIP.AUTO-MCNC: >=300 MG/DL
PROTHROMBIN TIME: 14.2 SEC (ref 11.9–14.9)
RBC # BLD AUTO: 3.74 M/UL (ref 4.2–5.9)
RBC #/AREA URNS HPF: >100 /HPF (ref 0–4)
SARS-COV-2 RNA RESP QL NAA+PROBE: NOT DETECTED
SODIUM SERPL-SCNC: 132 MMOL/L (ref 136–145)
SP GR UR STRIP.AUTO: 1.02 (ref 1–1.03)
TROPONIN, HIGH SENSITIVITY: 104 NG/L (ref 0–22)
TROPONIN, HIGH SENSITIVITY: 88 NG/L (ref 0–22)
UA COMPLETE W REFLEX CULTURE PNL UR: YES
UA DIPSTICK W REFLEX MICRO PNL UR: YES
URN SPEC COLLECT METH UR: ABNORMAL
UROBILINOGEN UR STRIP-ACNC: 2 E.U./DL
WBC # BLD AUTO: 10.7 K/UL (ref 4–11)
WBC #/AREA URNS HPF: ABNORMAL /HPF (ref 0–5)

## 2025-03-18 PROCEDURE — 96365 THER/PROPH/DIAG IV INF INIT: CPT

## 2025-03-18 PROCEDURE — 83690 ASSAY OF LIPASE: CPT

## 2025-03-18 PROCEDURE — 36415 COLL VENOUS BLD VENIPUNCTURE: CPT

## 2025-03-18 PROCEDURE — 6370000000 HC RX 637 (ALT 250 FOR IP): Performed by: STUDENT IN AN ORGANIZED HEALTH CARE EDUCATION/TRAINING PROGRAM

## 2025-03-18 PROCEDURE — 93005 ELECTROCARDIOGRAM TRACING: CPT | Performed by: PHYSICIAN ASSISTANT

## 2025-03-18 PROCEDURE — 71045 X-RAY EXAM CHEST 1 VIEW: CPT

## 2025-03-18 PROCEDURE — 87186 SC STD MICRODIL/AGAR DIL: CPT

## 2025-03-18 PROCEDURE — 99285 EMERGENCY DEPT VISIT HI MDM: CPT

## 2025-03-18 PROCEDURE — 85025 COMPLETE CBC W/AUTO DIFF WBC: CPT

## 2025-03-18 PROCEDURE — 51798 US URINE CAPACITY MEASURE: CPT

## 2025-03-18 PROCEDURE — 6360000002 HC RX W HCPCS: Performed by: PHYSICIAN ASSISTANT

## 2025-03-18 PROCEDURE — 87636 SARSCOV2 & INF A&B AMP PRB: CPT

## 2025-03-18 PROCEDURE — 6360000002 HC RX W HCPCS: Performed by: EMERGENCY MEDICINE

## 2025-03-18 PROCEDURE — 2500000003 HC RX 250 WO HCPCS: Performed by: INTERNAL MEDICINE

## 2025-03-18 PROCEDURE — 80053 COMPREHEN METABOLIC PANEL: CPT

## 2025-03-18 PROCEDURE — 96375 TX/PRO/DX INJ NEW DRUG ADDON: CPT

## 2025-03-18 PROCEDURE — 6370000000 HC RX 637 (ALT 250 FOR IP): Performed by: INTERNAL MEDICINE

## 2025-03-18 PROCEDURE — 84484 ASSAY OF TROPONIN QUANT: CPT

## 2025-03-18 PROCEDURE — 87086 URINE CULTURE/COLONY COUNT: CPT

## 2025-03-18 PROCEDURE — 83880 ASSAY OF NATRIURETIC PEPTIDE: CPT

## 2025-03-18 PROCEDURE — 93010 ELECTROCARDIOGRAM REPORT: CPT | Performed by: INTERNAL MEDICINE

## 2025-03-18 PROCEDURE — 2580000003 HC RX 258: Performed by: PHYSICIAN ASSISTANT

## 2025-03-18 PROCEDURE — 2060000000 HC ICU INTERMEDIATE R&B

## 2025-03-18 PROCEDURE — 87077 CULTURE AEROBIC IDENTIFY: CPT

## 2025-03-18 PROCEDURE — 83605 ASSAY OF LACTIC ACID: CPT

## 2025-03-18 PROCEDURE — 74176 CT ABD & PELVIS W/O CONTRAST: CPT

## 2025-03-18 PROCEDURE — 96372 THER/PROPH/DIAG INJ SC/IM: CPT

## 2025-03-18 PROCEDURE — 2580000003 HC RX 258: Performed by: INTERNAL MEDICINE

## 2025-03-18 PROCEDURE — 84145 PROCALCITONIN (PCT): CPT

## 2025-03-18 PROCEDURE — 6370000000 HC RX 637 (ALT 250 FOR IP): Performed by: PHYSICIAN ASSISTANT

## 2025-03-18 PROCEDURE — 81001 URINALYSIS AUTO W/SCOPE: CPT

## 2025-03-18 PROCEDURE — 85610 PROTHROMBIN TIME: CPT

## 2025-03-18 RX ORDER — 0.9 % SODIUM CHLORIDE 0.9 %
500 INTRAVENOUS SOLUTION INTRAVENOUS ONCE
Status: COMPLETED | OUTPATIENT
Start: 2025-03-18 | End: 2025-03-18

## 2025-03-18 RX ORDER — 0.9 % SODIUM CHLORIDE 0.9 %
1000 INTRAVENOUS SOLUTION INTRAVENOUS ONCE
Status: DISCONTINUED | OUTPATIENT
Start: 2025-03-18 | End: 2025-03-18

## 2025-03-18 RX ORDER — ENOXAPARIN SODIUM 100 MG/ML
30 INJECTION SUBCUTANEOUS DAILY
Status: DISCONTINUED | OUTPATIENT
Start: 2025-03-19 | End: 2025-03-21 | Stop reason: HOSPADM

## 2025-03-18 RX ORDER — MORPHINE SULFATE 4 MG/ML
4 INJECTION, SOLUTION INTRAMUSCULAR; INTRAVENOUS ONCE
Refills: 0 | Status: COMPLETED | OUTPATIENT
Start: 2025-03-18 | End: 2025-03-18

## 2025-03-18 RX ORDER — OXYCODONE HYDROCHLORIDE 5 MG/1
5 TABLET ORAL EVERY 6 HOURS PRN
Refills: 0 | Status: DISCONTINUED | OUTPATIENT
Start: 2025-03-18 | End: 2025-03-21 | Stop reason: HOSPADM

## 2025-03-18 RX ORDER — OXYCODONE HYDROCHLORIDE 5 MG/1
2.5 TABLET ORAL EVERY 6 HOURS PRN
Refills: 0 | Status: DISCONTINUED | OUTPATIENT
Start: 2025-03-18 | End: 2025-03-21 | Stop reason: HOSPADM

## 2025-03-18 RX ADMIN — OXYCODONE 5 MG: 5 TABLET ORAL at 22:44

## 2025-03-18 RX ADMIN — MORPHINE SULFATE 4 MG: 4 INJECTION, SOLUTION INTRAMUSCULAR; INTRAVENOUS at 16:29

## 2025-03-18 RX ADMIN — SODIUM CHLORIDE 1000 MG: 9 INJECTION, SOLUTION INTRAVENOUS at 15:49

## 2025-03-18 RX ADMIN — SODIUM ZIRCONIUM CYCLOSILICATE 10 G: 10 POWDER, FOR SUSPENSION ORAL at 15:48

## 2025-03-18 RX ADMIN — INSULIN HUMAN 6 UNITS: 100 INJECTION, SOLUTION PARENTERAL at 15:49

## 2025-03-18 RX ADMIN — SODIUM CHLORIDE 500 ML: 0.9 INJECTION, SOLUTION INTRAVENOUS at 14:13

## 2025-03-18 RX ADMIN — SODIUM BICARBONATE: 84 INJECTION, SOLUTION INTRAVENOUS at 19:17

## 2025-03-18 ASSESSMENT — PAIN DESCRIPTION - ORIENTATION
ORIENTATION: LOWER
ORIENTATION: LOWER

## 2025-03-18 ASSESSMENT — PAIN DESCRIPTION - DESCRIPTORS
DESCRIPTORS: DISCOMFORT
DESCRIPTORS: ACHING

## 2025-03-18 ASSESSMENT — PAIN DESCRIPTION - LOCATION
LOCATION: PELVIS
LOCATION: PELVIS

## 2025-03-18 ASSESSMENT — PAIN SCALES - GENERAL
PAINLEVEL_OUTOF10: 6
PAINLEVEL_OUTOF10: 6

## 2025-03-18 ASSESSMENT — PAIN - FUNCTIONAL ASSESSMENT: PAIN_FUNCTIONAL_ASSESSMENT: NONE - DENIES PAIN

## 2025-03-18 NOTE — CONSULTS
Thank you to requesting provider:  Dr. Mares  , for asking us to see Oneil Ma  Reason for consultation:  Acute kidney injury   Chief Complaint:  Lower abdominal pain     History of Presenting Illness      89 y/o male brought to the ER with lower abdominal pain and found to have cystitis on CT with hematuria.  Not able to get a esparza placed in the ER.  Bladder scans are negative although CT showed distended bladder.  We have been asked to see him for acute kidney injury and hyperkalemia.  Intake appears poor.  He has a history of CHF and he is currently on losartan and spironolactone.  No edema.        Past Medical/Surgical History      Active Ambulatory Problems     Diagnosis Date Noted    Arthritis     Bradycardia     Wears glasses     Slow heart rate     Cervical spine arthritis 07/08/2013    Squamous cell skin cancer     Neoplasm of uncertain behavior of skin     Venous insufficiency 01/04/2021    Ischemic cardiomyopathy 01/04/2021    Type 2 diabetes mellitus with other specified complication, unspecified whether long term insulin use (HCC) 01/04/2021    Mixed hyperlipidemia 01/04/2021     Resolved Ambulatory Problems     Diagnosis Date Noted    Pulmonary embolism (HCC)     OA (osteoarthritis) 11/03/2020    Annual physical exam 01/04/2021     Past Medical History:   Diagnosis Date    PE (pulmonary embolism) 1991         Review of Systems     Constitutional:  No weight loss, no fever/chills  Eyes:  No eye pain, no eye redness  Cardiovascular:  No chest pain, no worsening of edema  Respiratory:  No hemoptysis, no stridor  Gastrointestinal:  No blood in stool, no n/v, no diarrhea  Genitoruinary:  + hematuria, + difficulty with urination, mostly incontinence   Musculoskeletal:  No joint swelling, no redness  Integumentary:  No Rash, no itching  Neurological:  No focal weakness, No new sensory deficit  Psychiatric:  No depression, no confusion  Endocrine:  No polyuria, no polydipsia       Medications   Labs     03/18/25  1352   *   K 6.1*      CO2 21   GLUCOSE 311*   BUN 49*   CREATININE 2.6*   LABGLOM 22*       Assessment:    Acute Kidney Injury:  KDIGO stage 2  Etiology:  Concern for urinary retention vs. ATN due to cystitis  Hyperkalemia:  More common in CHRISTINA due to obstruction.  CT did not show hydronephrosis but did have a distended bladder.  Also on spironolactone and losartan at home  CHF:  EF = 30% / compensated with no edema.  On room air.  Not short of breath  Hematuria/Cystitis:  On IV antibiotics.  Urology consulted     Plan:    Miller placement would be helpful  Urology consult   Follow labs  Insulin to help with potassium  Lokelma   Gentle IV fluids   Expand work up as indicated    Thank you for asking us to participate in the management of your patient, please do not hesitate to contact me for any concerns regarding my recommendations as outlined above.    -----------------------------  Zuleika Bonds M.D.   [facility]

## 2025-03-18 NOTE — ED PROVIDER NOTES
Emergency Department Attending Provider Note  Location: [facility] EMERGENCY DEPARTMENT  3/18/2025     Patient Identification  Oneil Ma is a 89 y.o. male      Oneil Ma was evaluated in the Emergency Department for hematuria and bladder pains.     HPI: as noted above    Physical Exam: Elderly frail-appearing bedbound or in wheelchair.  Suprapubic tenderness without guarding      EKG Interpretation  Rhythm is atrial sensed ventricular paced rhythm  Rate is 60  Axis is abnormal  No STEMI criteria  Qtc is 479    Bedside Ultrasound  No results found.     Patient seen and evaluated.  Relevant records reviewed.  Patient presents with symptoms noted above.  Patient has evidence of CHRISTINA with hyperkalemia 6.1.  No EKG changes noted.  Has evidence of acute cystitis.  CT abdomen pelvis shows no evidence of ureterolithiasis.  He does have likely some degree of urinary retention.  Will place catheter, admission, nephrology consult.    Although initial history and physical exam information was obtained by RICHARD/NPP/MD/ (who also dictated a record of this visit), I personally saw the patient and made/approved the management plan and take responsibility for patient management. I, Dr. Bee, am the primary clinician of record.     I personally saw the patient and independently provided 10 minutes of non-concurrent critical care out of the total shared critical care time excluding separately billed procedures.    This chart was generated in part by using Dragon Dictation system and may contain errors related to that system including errors in grammar, punctuation, and spelling, as well as words and phrases that may be inappropriate. If there are any questions or concerns please feel free to contact the dictating provider for clarification.     Bob Bee MD   [employer]        Bob Bee MD  03/18/25 1012

## 2025-03-18 NOTE — ED PROVIDER NOTES
[facility] TELEMETRY  EMERGENCY DEPARTMENT ENCOUNTER        Pt Name: Oneil Ma  MRN: [id number]  Birthdate 8/12/1932  Date of evaluation: 3/18/2025  Provider: Joselito Lund PA-C  PCP: Shaun Mendoza MD  Note Started: 2:15 PM EDT 3/18/25       I have seen and evaluated this patient with my supervising physician Bob Bee MD.      CHIEF COMPLAINT       Chief Complaint   Patient presents with    Hematuria     Pt arrived by EMS from home with c/o lower abd pain with blood in urine. Pt reports also reports lower back pain. Pt wears depends and c/o urinary incontinence. Pt dirty of stool and urine upon arrival.         HISTORY OF PRESENT ILLNESS: 1 or more Elements     History From: Patient, daughter and granddaughter    Oneil Ma is a 89 y.o. male who presents to the Emergency Department by EMS.  Patient with complaint pelvic pain.  It is relayed to me by the patient and family members that he has had chronic back pain for 2 to 3 years or greater.  However more specifically over the past 6 months the patient has had a gradual decline going from a cane to a walker now to wheelchair.  This has become more noticeable and worse over the past 2 months and months.  He is also experienced weight loss..  He report he wears a depends undergarment.  States he not been of urine.  Dribbles.  Some blood had been noted.  He had some stool in his depends.  He is not constipated nor is it diarrhea.  The family does indicate they changed him on a reasonable and regular basis.    Patient does not report chest pain or shortness of breath.  It is relayed to me that he does follow with cardiology [facility] and he has CHF with ICD/pacemaker and his EF last assessed a few months ago was 35%.  His BP typically is soft.  Initially 107/66.  Rechecked at 102/64.    Patient does not report dysuria.  The patient does report lower abdominal discomfort and this is the primary reason he came into the  cardiomediastinal silhouette is without acute process. The osseous structures are without acute process.  Elevated right hemidiaphragm.     No acute process.         ED Provider US Interpretation.    No results found.    PROCEDURES   Unless otherwise noted below, none     Procedures      CRITICAL CARE TIME (.cctime)   Critical Care  There was a high probability of life-threatening clinical deterioration in the patient's condition requiring my urgent intervention.  Total critical care time with the patient was 35 minutes excluding separately reportable procedures.  Critical care required due to patients finding of hyperkalemia requiring an emergent reduction of his serum potassium.  I did consult nephrology.  Patient also has UTI and acute kidney injury.  Potentially related to UTI or dehydration.      PAST MEDICAL HISTORY      has a past medical history of Arthritis, Bradycardia, OA (osteoarthritis), PE (pulmonary embolism) (1991), Slow heart rate (2011), and Wears glasses.     EMERGENCY DEPARTMENT COURSE and DIFFERENTIAL DIAGNOSIS/MDM:   Vitals:    Vitals:    03/20/25 2339 03/21/25 0415 03/21/25 0654 03/21/25 1104   BP: (!) 92/49 101/68 112/69 123/77   Pulse: 79 88 88 88   Resp: 16 16 16 16   Temp: 98.7 °F (37.1 °C) 97.9 °F (36.6 °C) 97.5 °F (36.4 °C) 97.4 °F (36.3 °C)   TempSrc: Axillary Oral Oral Oral   SpO2: 91% 94% 93% 93%   Weight:       Height:           Is this patient to be included in the SEP-1 Core Measure due to severe sepsis or septic shock?   No   Exclusion criteria - the patient is NOT to be included for SEP-1 Core Measure due to:  Infection is not suspected    Patient was given the following medications:  Medications   sodium chloride 0.9 % bolus 500 mL (0 mLs IntraVENous Stopped 3/18/25 1522)   insulin regular (HumuLIN R;NovoLIN R) injection 6 Units (6 Units SubCUTAneous Given 3/18/25 1549)   cefTRIAXone (ROCEPHIN) 1,000 mg in sodium chloride 0.9 % 50 mL IVPB (addEASE) (0 mg IntraVENous Stopped

## 2025-03-18 NOTE — ED NOTES
1504 - Consult placed to Nephrology for acute renal insufficiency and hyperkalemia. Naya will page Dr. Bonds for consult.     1525 - Callback received from Dr. Bonds; call transferred directly to Joselito Lund PA-C. Case is being discussed at this time.

## 2025-03-19 ENCOUNTER — ANESTHESIA EVENT (OUTPATIENT)
Dept: ENDOSCOPY | Age: 89
End: 2025-03-19
Payer: MEDICARE

## 2025-03-19 ENCOUNTER — APPOINTMENT (OUTPATIENT)
Dept: GENERAL RADIOLOGY | Age: 89
DRG: 683 | End: 2025-03-19
Payer: MEDICARE

## 2025-03-19 PROBLEM — N18.9 ACUTE KIDNEY INJURY SUPERIMPOSED ON CKD: Status: ACTIVE | Noted: 2025-03-19

## 2025-03-19 PROBLEM — N30.01 ACUTE CYSTITIS WITH HEMATURIA: Status: ACTIVE | Noted: 2025-03-19

## 2025-03-19 PROBLEM — N17.9 ACUTE KIDNEY INJURY SUPERIMPOSED ON CKD: Status: ACTIVE | Noted: 2025-03-19

## 2025-03-19 PROBLEM — I50.22 CHRONIC HEART FAILURE WITH REDUCED EJECTION FRACTION (HFREF, <= 40%) (HCC): Status: ACTIVE | Noted: 2025-03-19

## 2025-03-19 LAB
ANION GAP SERPL CALCULATED.3IONS-SCNC: 11 MMOL/L (ref 3–16)
ANION GAP SERPL CALCULATED.3IONS-SCNC: 13 MMOL/L (ref 3–16)
ANION GAP SERPL CALCULATED.3IONS-SCNC: 14 MMOL/L (ref 3–16)
BASOPHILS # BLD: 0.1 K/UL (ref 0–0.2)
BASOPHILS NFR BLD: 0.6 %
BUN SERPL-MCNC: 42 MG/DL (ref 7–20)
BUN SERPL-MCNC: 44 MG/DL (ref 7–20)
BUN SERPL-MCNC: 44 MG/DL (ref 7–20)
CALCIUM SERPL-MCNC: 8.1 MG/DL (ref 8.3–10.6)
CALCIUM SERPL-MCNC: 8.2 MG/DL (ref 8.3–10.6)
CALCIUM SERPL-MCNC: 8.5 MG/DL (ref 8.3–10.6)
CHLORIDE SERPL-SCNC: 101 MMOL/L (ref 99–110)
CHLORIDE SERPL-SCNC: 105 MMOL/L (ref 99–110)
CHLORIDE SERPL-SCNC: 107 MMOL/L (ref 99–110)
CO2 SERPL-SCNC: 17 MMOL/L (ref 21–32)
CO2 SERPL-SCNC: 19 MMOL/L (ref 21–32)
CO2 SERPL-SCNC: 22 MMOL/L (ref 21–32)
CREAT SERPL-MCNC: 2.5 MG/DL (ref 0.8–1.3)
CREAT SERPL-MCNC: 2.6 MG/DL (ref 0.8–1.3)
CREAT SERPL-MCNC: 2.7 MG/DL (ref 0.8–1.3)
DEPRECATED RDW RBC AUTO: 14.4 % (ref 12.4–15.4)
EOSINOPHIL # BLD: 0.2 K/UL (ref 0–0.6)
EOSINOPHIL NFR BLD: 1.7 %
EST. AVERAGE GLUCOSE BLD GHB EST-MCNC: 205.9 MG/DL
EST. AVERAGE GLUCOSE BLD GHB EST-MCNC: 208.7 MG/DL
GFR SERPLBLD CREATININE-BSD FMLA CKD-EPI: 21 ML/MIN/{1.73_M2}
GFR SERPLBLD CREATININE-BSD FMLA CKD-EPI: 22 ML/MIN/{1.73_M2}
GFR SERPLBLD CREATININE-BSD FMLA CKD-EPI: 23 ML/MIN/{1.73_M2}
GLUCOSE BLD-MCNC: 110 MG/DL (ref 70–99)
GLUCOSE BLD-MCNC: 188 MG/DL (ref 70–99)
GLUCOSE BLD-MCNC: 189 MG/DL (ref 70–99)
GLUCOSE BLD-MCNC: 227 MG/DL (ref 70–99)
GLUCOSE BLD-MCNC: 246 MG/DL (ref 70–99)
GLUCOSE BLD-MCNC: 288 MG/DL (ref 70–99)
GLUCOSE BLD-MCNC: 322 MG/DL (ref 70–99)
GLUCOSE SERPL-MCNC: 120 MG/DL (ref 70–99)
GLUCOSE SERPL-MCNC: 123 MG/DL (ref 70–99)
GLUCOSE SERPL-MCNC: 292 MG/DL (ref 70–99)
HBA1C MFR BLD: 8.8 %
HBA1C MFR BLD: 8.9 %
HCT VFR BLD AUTO: 34.5 % (ref 40.5–52.5)
HCT VFR BLD AUTO: 36.9 % (ref 40.5–52.5)
HGB BLD-MCNC: 11.1 G/DL (ref 13.5–17.5)
HGB BLD-MCNC: 11.7 G/DL (ref 13.5–17.5)
LACTATE BLDV-SCNC: 1.6 MMOL/L (ref 0.4–2)
LYMPHOCYTES # BLD: 1.9 K/UL (ref 1–5.1)
LYMPHOCYTES NFR BLD: 15.7 %
MCH RBC QN AUTO: 31.4 PG (ref 26–34)
MCHC RBC AUTO-ENTMCNC: 31.8 G/DL (ref 31–36)
MCV RBC AUTO: 98.8 FL (ref 80–100)
MONOCYTES # BLD: 1.5 K/UL (ref 0–1.3)
MONOCYTES NFR BLD: 12.5 %
NEUTROPHILS # BLD: 8.2 K/UL (ref 1.7–7.7)
NEUTROPHILS NFR BLD: 69.5 %
PERFORMED ON: ABNORMAL
PLATELET # BLD AUTO: 384 K/UL (ref 135–450)
PMV BLD AUTO: 7.4 FL (ref 5–10.5)
POTASSIUM SERPL-SCNC: 5.5 MMOL/L (ref 3.5–5.1)
POTASSIUM SERPL-SCNC: 5.5 MMOL/L (ref 3.5–5.1)
POTASSIUM SERPL-SCNC: 5.7 MMOL/L (ref 3.5–5.1)
POTASSIUM SERPL-SCNC: 5.8 MMOL/L (ref 3.5–5.1)
RBC # BLD AUTO: 3.73 M/UL (ref 4.2–5.9)
SODIUM SERPL-SCNC: 133 MMOL/L (ref 136–145)
SODIUM SERPL-SCNC: 138 MMOL/L (ref 136–145)
SODIUM SERPL-SCNC: 138 MMOL/L (ref 136–145)
WBC # BLD AUTO: 11.9 K/UL (ref 4–11)

## 2025-03-19 PROCEDURE — 6370000000 HC RX 637 (ALT 250 FOR IP): Performed by: INTERNAL MEDICINE

## 2025-03-19 PROCEDURE — 85014 HEMATOCRIT: CPT

## 2025-03-19 PROCEDURE — 97165 OT EVAL LOW COMPLEX 30 MIN: CPT

## 2025-03-19 PROCEDURE — 85025 COMPLETE CBC W/AUTO DIFF WBC: CPT

## 2025-03-19 PROCEDURE — 84132 ASSAY OF SERUM POTASSIUM: CPT

## 2025-03-19 PROCEDURE — 83036 HEMOGLOBIN GLYCOSYLATED A1C: CPT

## 2025-03-19 PROCEDURE — 2060000000 HC ICU INTERMEDIATE R&B

## 2025-03-19 PROCEDURE — 99232 SBSQ HOSP IP/OBS MODERATE 35: CPT | Performed by: INTERNAL MEDICINE

## 2025-03-19 PROCEDURE — 6370000000 HC RX 637 (ALT 250 FOR IP): Performed by: STUDENT IN AN ORGANIZED HEALTH CARE EDUCATION/TRAINING PROGRAM

## 2025-03-19 PROCEDURE — 2500000003 HC RX 250 WO HCPCS: Performed by: STUDENT IN AN ORGANIZED HEALTH CARE EDUCATION/TRAINING PROGRAM

## 2025-03-19 PROCEDURE — 97530 THERAPEUTIC ACTIVITIES: CPT

## 2025-03-19 PROCEDURE — 2580000003 HC RX 258: Performed by: INTERNAL MEDICINE

## 2025-03-19 PROCEDURE — 6370000000 HC RX 637 (ALT 250 FOR IP): Performed by: PHYSICIAN ASSISTANT

## 2025-03-19 PROCEDURE — 92611 MOTION FLUOROSCOPY/SWALLOW: CPT

## 2025-03-19 PROCEDURE — 0T9B70Z DRAINAGE OF BLADDER WITH DRAINAGE DEVICE, VIA NATURAL OR ARTIFICIAL OPENING: ICD-10-PCS | Performed by: NURSE PRACTITIONER

## 2025-03-19 PROCEDURE — 80048 BASIC METABOLIC PNL TOTAL CA: CPT

## 2025-03-19 PROCEDURE — 2580000003 HC RX 258: Performed by: STUDENT IN AN ORGANIZED HEALTH CARE EDUCATION/TRAINING PROGRAM

## 2025-03-19 PROCEDURE — 92526 ORAL FUNCTION THERAPY: CPT

## 2025-03-19 PROCEDURE — 92610 EVALUATE SWALLOWING FUNCTION: CPT

## 2025-03-19 PROCEDURE — 74230 X-RAY XM SWLNG FUNCJ C+: CPT

## 2025-03-19 PROCEDURE — 83605 ASSAY OF LACTIC ACID: CPT

## 2025-03-19 PROCEDURE — 6370000000 HC RX 637 (ALT 250 FOR IP): Performed by: NURSE PRACTITIONER

## 2025-03-19 PROCEDURE — 6360000002 HC RX W HCPCS: Performed by: STUDENT IN AN ORGANIZED HEALTH CARE EDUCATION/TRAINING PROGRAM

## 2025-03-19 PROCEDURE — 2500000003 HC RX 250 WO HCPCS: Performed by: INTERNAL MEDICINE

## 2025-03-19 PROCEDURE — 36415 COLL VENOUS BLD VENIPUNCTURE: CPT

## 2025-03-19 PROCEDURE — 87040 BLOOD CULTURE FOR BACTERIA: CPT

## 2025-03-19 PROCEDURE — 85018 HEMOGLOBIN: CPT

## 2025-03-19 RX ORDER — GLUCAGON 1 MG/ML
1 KIT INJECTION PRN
Status: DISCONTINUED | OUTPATIENT
Start: 2025-03-19 | End: 2025-03-21 | Stop reason: HOSPADM

## 2025-03-19 RX ORDER — ACETAMINOPHEN 650 MG/1
650 SUPPOSITORY RECTAL EVERY 6 HOURS PRN
Status: DISCONTINUED | OUTPATIENT
Start: 2025-03-19 | End: 2025-03-21 | Stop reason: HOSPADM

## 2025-03-19 RX ORDER — CALCIUM GLUCONATE 20 MG/ML
1000 INJECTION, SOLUTION INTRAVENOUS ONCE
Status: COMPLETED | OUTPATIENT
Start: 2025-03-19 | End: 2025-03-19

## 2025-03-19 RX ORDER — LIDOCAINE HYDROCHLORIDE 10 MG/ML
0.3 INJECTION, SOLUTION EPIDURAL; INFILTRATION; INTRACAUDAL; PERINEURAL
Status: CANCELLED | OUTPATIENT
Start: 2025-03-19

## 2025-03-19 RX ORDER — SODIUM CHLORIDE 9 MG/ML
INJECTION, SOLUTION INTRAVENOUS PRN
Status: DISCONTINUED | OUTPATIENT
Start: 2025-03-19 | End: 2025-03-21 | Stop reason: HOSPADM

## 2025-03-19 RX ORDER — INSULIN LISPRO 100 [IU]/ML
0-4 INJECTION, SOLUTION INTRAVENOUS; SUBCUTANEOUS
Status: DISCONTINUED | OUTPATIENT
Start: 2025-03-19 | End: 2025-03-21 | Stop reason: HOSPADM

## 2025-03-19 RX ORDER — ONDANSETRON 2 MG/ML
4 INJECTION INTRAMUSCULAR; INTRAVENOUS EVERY 6 HOURS PRN
Status: DISCONTINUED | OUTPATIENT
Start: 2025-03-19 | End: 2025-03-21 | Stop reason: HOSPADM

## 2025-03-19 RX ORDER — LIDOCAINE HYDROCHLORIDE 20 MG/ML
JELLY TOPICAL ONCE
Status: COMPLETED | OUTPATIENT
Start: 2025-03-19 | End: 2025-03-19

## 2025-03-19 RX ORDER — ASPIRIN 81 MG/1
81 TABLET ORAL DAILY
Status: DISCONTINUED | OUTPATIENT
Start: 2025-03-19 | End: 2025-03-21 | Stop reason: HOSPADM

## 2025-03-19 RX ORDER — SODIUM CHLORIDE 0.9 % (FLUSH) 0.9 %
5-40 SYRINGE (ML) INJECTION EVERY 12 HOURS SCHEDULED
Status: CANCELLED | OUTPATIENT
Start: 2025-03-19

## 2025-03-19 RX ORDER — POLYETHYLENE GLYCOL 3350 17 G/17G
17 POWDER, FOR SOLUTION ORAL DAILY PRN
Status: DISCONTINUED | OUTPATIENT
Start: 2025-03-19 | End: 2025-03-21 | Stop reason: HOSPADM

## 2025-03-19 RX ORDER — DEXTROSE MONOHYDRATE 100 MG/ML
INJECTION, SOLUTION INTRAVENOUS CONTINUOUS PRN
Status: DISCONTINUED | OUTPATIENT
Start: 2025-03-19 | End: 2025-03-21 | Stop reason: HOSPADM

## 2025-03-19 RX ORDER — DEXTROSE MONOHYDRATE 100 MG/ML
INJECTION, SOLUTION INTRAVENOUS CONTINUOUS PRN
Status: DISCONTINUED | OUTPATIENT
Start: 2025-03-19 | End: 2025-03-19 | Stop reason: SDUPTHER

## 2025-03-19 RX ORDER — GLUCAGON 1 MG/ML
1 KIT INJECTION PRN
Status: DISCONTINUED | OUTPATIENT
Start: 2025-03-19 | End: 2025-03-19 | Stop reason: SDUPTHER

## 2025-03-19 RX ORDER — SODIUM CHLORIDE 0.9 % (FLUSH) 0.9 %
5-40 SYRINGE (ML) INJECTION PRN
Status: CANCELLED | OUTPATIENT
Start: 2025-03-19

## 2025-03-19 RX ORDER — ACETAMINOPHEN 325 MG/1
650 TABLET ORAL EVERY 6 HOURS PRN
Status: DISCONTINUED | OUTPATIENT
Start: 2025-03-19 | End: 2025-03-21 | Stop reason: HOSPADM

## 2025-03-19 RX ORDER — PANTOPRAZOLE SODIUM 20 MG/1
20 TABLET, DELAYED RELEASE ORAL
Status: DISCONTINUED | OUTPATIENT
Start: 2025-03-19 | End: 2025-03-21 | Stop reason: HOSPADM

## 2025-03-19 RX ORDER — ATORVASTATIN CALCIUM 10 MG/1
10 TABLET, FILM COATED ORAL DAILY
Status: DISCONTINUED | OUTPATIENT
Start: 2025-03-19 | End: 2025-03-21 | Stop reason: HOSPADM

## 2025-03-19 RX ORDER — SODIUM CHLORIDE 0.9 % (FLUSH) 0.9 %
5-40 SYRINGE (ML) INJECTION EVERY 12 HOURS SCHEDULED
Status: DISCONTINUED | OUTPATIENT
Start: 2025-03-19 | End: 2025-03-21 | Stop reason: HOSPADM

## 2025-03-19 RX ORDER — SODIUM CHLORIDE, SODIUM LACTATE, POTASSIUM CHLORIDE, CALCIUM CHLORIDE 600; 310; 30; 20 MG/100ML; MG/100ML; MG/100ML; MG/100ML
INJECTION, SOLUTION INTRAVENOUS CONTINUOUS
Status: CANCELLED | OUTPATIENT
Start: 2025-03-19

## 2025-03-19 RX ORDER — LOSARTAN POTASSIUM 25 MG/1
25 TABLET ORAL DAILY
Status: DISCONTINUED | OUTPATIENT
Start: 2025-03-19 | End: 2025-03-21 | Stop reason: HOSPADM

## 2025-03-19 RX ORDER — SODIUM CHLORIDE 0.9 % (FLUSH) 0.9 %
5-40 SYRINGE (ML) INJECTION PRN
Status: DISCONTINUED | OUTPATIENT
Start: 2025-03-19 | End: 2025-03-21 | Stop reason: HOSPADM

## 2025-03-19 RX ORDER — SODIUM CHLORIDE 9 MG/ML
INJECTION, SOLUTION INTRAVENOUS PRN
Status: CANCELLED | OUTPATIENT
Start: 2025-03-19

## 2025-03-19 RX ORDER — ONDANSETRON 4 MG/1
4 TABLET, ORALLY DISINTEGRATING ORAL EVERY 8 HOURS PRN
Status: DISCONTINUED | OUTPATIENT
Start: 2025-03-19 | End: 2025-03-21 | Stop reason: HOSPADM

## 2025-03-19 RX ADMIN — DEXTROSE MONOHYDRATE 250 ML: 100 INJECTION, SOLUTION INTRAVENOUS at 06:37

## 2025-03-19 RX ADMIN — INSULIN LISPRO 3 UNITS: 100 INJECTION, SOLUTION INTRAVENOUS; SUBCUTANEOUS at 17:22

## 2025-03-19 RX ADMIN — INSULIN HUMAN 10 UNITS: 100 INJECTION, SOLUTION PARENTERAL at 06:38

## 2025-03-19 RX ADMIN — CALCIUM GLUCONATE 1000 MG: 20 INJECTION, SOLUTION INTRAVENOUS at 06:57

## 2025-03-19 RX ADMIN — Medication 10 ML: at 09:06

## 2025-03-19 RX ADMIN — SODIUM ZIRCONIUM CYCLOSILICATE 10 G: 10 POWDER, FOR SUSPENSION ORAL at 17:24

## 2025-03-19 RX ADMIN — SODIUM ZIRCONIUM CYCLOSILICATE 10 G: 10 POWDER, FOR SUSPENSION ORAL at 10:33

## 2025-03-19 RX ADMIN — LIDOCAINE HYDROCHLORIDE: 20 JELLY TOPICAL at 15:00

## 2025-03-19 RX ADMIN — ASPIRIN 81 MG: 81 TABLET, COATED ORAL at 10:33

## 2025-03-19 RX ADMIN — ATORVASTATIN CALCIUM 10 MG: 10 TABLET, FILM COATED ORAL at 10:32

## 2025-03-19 RX ADMIN — OXYCODONE 5 MG: 5 TABLET ORAL at 10:59

## 2025-03-19 RX ADMIN — PANTOPRAZOLE SODIUM 20 MG: 20 TABLET, DELAYED RELEASE ORAL at 05:44

## 2025-03-19 RX ADMIN — SODIUM CHLORIDE 1000 MG: 9 INJECTION, SOLUTION INTRAVENOUS at 21:30

## 2025-03-19 RX ADMIN — SODIUM BICARBONATE: 84 INJECTION, SOLUTION INTRAVENOUS at 06:04

## 2025-03-19 RX ADMIN — POLYETHYLENE GLYCOL (3350) 17 G: 17 POWDER, FOR SOLUTION ORAL at 17:25

## 2025-03-19 RX ADMIN — SODIUM BICARBONATE: 84 INJECTION, SOLUTION INTRAVENOUS at 17:56

## 2025-03-19 RX ADMIN — INSULIN LISPRO 2 UNITS: 100 INJECTION, SOLUTION INTRAVENOUS; SUBCUTANEOUS at 21:21

## 2025-03-19 ASSESSMENT — PAIN SCALES - GENERAL: PAINLEVEL_OUTOF10: 8

## 2025-03-19 ASSESSMENT — LIFESTYLE VARIABLES
HOW MANY STANDARD DRINKS CONTAINING ALCOHOL DO YOU HAVE ON A TYPICAL DAY: PATIENT DOES NOT DRINK
HOW OFTEN DO YOU HAVE A DRINK CONTAINING ALCOHOL: NEVER

## 2025-03-19 ASSESSMENT — PAIN DESCRIPTION - LOCATION: LOCATION: BACK

## 2025-03-19 ASSESSMENT — PAIN DESCRIPTION - ORIENTATION: ORIENTATION: LOWER

## 2025-03-19 ASSESSMENT — PAIN DESCRIPTION - DESCRIPTORS: DESCRIPTORS: ACHING

## 2025-03-19 NOTE — PROGRESS NOTES
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]  Distended bladder [N32.89]  Acute renal insufficiency [N28.9]  Failure to thrive in adult [R62.7]  Acute cystitis with hematuria [N30.01]  Hyperglycemia due to diabetes mellitus (HCC) [E11.65]  Chronic heart failure with reduced ejection fraction (HFrEF, <= 40%) ([facility]) [I50.22]  Admit Date:  3/18/2025  Precautions/Restrictions/WB Status/ Lines/ Wounds/ Oxygen: Fall risk, Bed/chair alarm, Lines (IV and external catheter), and Council (hard of hearing)      Treatment Time:  8:55-9:35  Treatment Number:  1  Timed Code Treatment Minutes: 30 minutes  Total Treatment Minutes:  40  minutes    Patient Goals for Therapy: \"to go home\"          Discharge Recommendations: Home with initial 24/7 assist  and Home OT  DME needs for discharge: Needs Met       Therapy recommendations for staff:   Assist of 1 for transfers with use of rolling walker (RW) and gait belt to/from chair    History of Present Illness: 89 y.o. male who presented to [facility] with pelvic pain and found to have emphysematous cystitis.  PMHx significant for ischemic cardiomyopathy, status post biventricular ICD, chronic HFrEF, history of complete heart block, wheelchair-bound, type 2 diabetes.       Patient reports that today, he started develop lower abdominal pain and spasms.  Also noted some blood in his urine as well as lower back pain.  He has chronic urinary incontinence and is wheelchair-bound at baseline.  Patient Nuys any fevers or chills.  Denies any nausea or vomiting.  He declined any symptoms of dysuria.  On my exam, he feels well but per daughter at bedside, she notes that he really does not complain much of pain but you can tell he becomes uncomfortable at times.  He was comfortable my exam.  No other acute

## 2025-03-19 NOTE — CARE COORDINATION
Case Management Assessment  Initial Evaluation    Date/Time of Evaluation: 3/19/2025 9:45 AM  Assessment Completed by: Lexi Armstrong RN    If patient is discharged prior to next notation, then this note serves as note for discharge by case management.    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 Yes    Chart Reviewed: Yes      History Provided by: Patient  Patient Orientation: Alert and Oriented    Patient Cognition: Alert    Hospitalization in the last 30 days (Readmission):  No    If yes, Readmission Assessment in  Navigator will be completed.    Advance Directives:      Code Status: Limited   Patient's Primary Decision Maker is: Legal Next of Kin    Primary Decision Maker: Gayle Choudhury - Child - [phone]    Discharge Planning:    Patient lives with: Children Type of Home: House  Primary Care Giver: Self  Patient Support Systems include: Children, Family Members   Current Financial resources: Medicare  Current community resources: None  Current services prior to admission: Durable Medical Equipment            Current DME: Wheelchair            Type of Home Care services:  None    ADLS  Prior functional level: Assistance with the following:, Mobility, Shopping, Housework, Cooking  Current functional level: Assistance with the following:, Cooking, Housework, Shopping, Mobility    PT  shares the quality data associated with the providers was provided to:     Patient Representative Name:       The Patient and/or Patient Representative Agree with the Discharge Plan?      Lexi Armstrong RN, [facility]  Case Management Department  Ph: [phone] Fax: [phone]

## 2025-03-19 NOTE — PROGRESS NOTES
End of shift report given to Dasha RN  -  care transferred -  patient resting in bed w/ eyes closed - esparza draining hematuric urine.

## 2025-03-19 NOTE — ED NOTES
Pt wet depends replaced with dry and pt medicated with po pain med. Pt aware he will go upstairs soon to inpatient bed assignment.

## 2025-03-19 NOTE — PROGRESS NOTES
Patient returned from [facility] - placed on specialty bed.  Repositioned in bed, esparza draining hematuric urine.  Eyes closed to rest  -  family at bedside and updated on plan of care includiing patient to be NPO after midnight for possible EGD tomorrow afternoon

## 2025-03-19 NOTE — WOUND CARE
Wound Referral Progress Note       NAME:  Oneil Ma  MEDICAL RECORD NUMBER:  [id number]  AGE: 89 y.o.   GENDER: male  : 1932  TODAY'S DATE:  3/19/2025    Subjective  Family at bedside, pt alert and following commands   Reason for WOCN Evaluation and Assessment: Gabe Ma is a 89 y.o. male referred by:   Provider and Nursing    Wound Identification:  Wound Type: pressure  Contributing Factors: chronic pressure, decreased mobility, incontinence of stool, and incontinence of urine    Pt seen for wound care.  Pt admitted from home pelvic pain and cystitis.  He is wheelchair at baseline with chronic urinary incontinence and some stool incontinence.  He has a male Purwick in place here and it is functioning properly and keeping him dry, no diarrhea.  Family states the area is painful to him and it heals and reopens frequently.      Patient Care Goal:  Wound Healing        PAST MEDICAL HISTORY        Diagnosis Date    Arthritis     Bradycardia     OA (osteoarthritis)     PE (pulmonary embolism)     Slow heart rate     had pm    Wears glasses        PAST SURGICAL HISTORY    Past Surgical History:   Procedure Laterality Date    CARDIAC SURGERY  2011    pacemaker, defib    CARPAL TUNNEL RELEASE  1/10/12    Right    CYST REMOVAL      OFF BACK    EYE SURGERY      bilateral cataracts    OTHER SURGICAL HISTORY Left 2016    excision of skin cancer of hand    OTHER SURGICAL HISTORY Left 08/15/2017    excision of lesions times 2, left ear    SKIN BIOPSY  2012    excision lesion behind the right ear       FAMILY HISTORY    Family History   Problem Relation Age of Onset    Arthritis Father     Cancer Father        SOCIAL HISTORY    Social History     Tobacco Use    Smoking status: Never    Smokeless tobacco: Never   Vaping Use    Vaping status: Never Used   Substance Use Topics    Alcohol use: No    Drug use: No       ALLERGIES    Allergies   Allergen Reactions    Lisinopril  hyperlipidemia E78.2    Emphysematous cystitis N30.80    Acute kidney injury superimposed on CKD N17.9, N18.9    Acute cystitis with hematuria N30.01    Chronic heart failure with reduced ejection fraction (HFrEF, <= 40%) ([facility]) I50.22       Sacrum/coccyx:  6x2.5x0.1cm, 100% red, moist tissue, stage 2 PI, POA.            Response to treatment:  Well tolerated by patient.     Pain Assessment:  Severity:  0 / 10  Quality of pain: N/A  Wound Pain Timing/Severity: none  Premedicated: N/A    Plan   Sacrum:  Cleanse wound with NS< pat dry.  Apply Sacral foam dressing and change every 3 days and prn.  If pt having multiple BMs requiring frequent dressing changes, discontinue use of foam dressing and use Triad paste.    Remove brief whiloe in bed if pt agreeable, use incontinent pad only.  Dream Air specialty bed-ordered       Specialty Bed Required :DREAM Air ordered  Yes Alternating pressure relief and Low air loss    Current Diet: ADULT DIET; Dysphagia - Soft and Bite Sized  Dietician consult:  Yes    Discharge Plan:  Placement for patient upon discharge: Skilled Nursing Facility SNF   Patient appropriate for Outpatient Wound Care Center: Yes    Referrals:   and Supplies    Patient/Caregiver Teaching:family at bedside and patient  Level of patient/caregiver understanding able to:  Verbal understanding and Needs reinforcement       Electronically signed by Katelyn Gomez RN, CWOCN on 3/19/2025 at 1:59 PM

## 2025-03-19 NOTE — PLAN OF CARE
Problem: Chronic Conditions and Co-morbidities  Goal: Patient's chronic conditions and co-morbidity symptoms are monitored and maintained or improved  Outcome: Progressing  Flowsheets (Taken 3/19/2025 0110)  Care Plan - Patient's Chronic Conditions and Co-Morbidity Symptoms are Monitored and Maintained or Improved: Monitor and assess patient's chronic conditions and comorbid symptoms for stability, deterioration, or improvement     Problem: Safety - Adult  Goal: Free from fall injury  Outcome: Progressing  Flowsheets (Taken 3/19/2025 0110)  Free From Fall Injury: Instruct family/caregiver on patient safety     Problem: Skin/Tissue Integrity  Goal: Skin integrity remains intact  Description: 1.  Monitor for areas of redness and/or skin breakdown  2.  Assess vascular access sites hourly  3.  Every 4-6 hours minimum:  Change oxygen saturation probe site  4.  Every 4-6 hours:  If on nasal continuous positive airway pressure, respiratory therapy assess nares and determine need for appliance change or resting period  Outcome: Progressing  Flowsheets  Taken 3/19/2025 0110  Skin Integrity Remains Intact: Monitor for areas of redness and/or skin breakdown  Taken 3/19/2025 0027  Skin Integrity Remains Intact: Monitor for areas of redness and/or skin breakdown

## 2025-03-19 NOTE — PROGRESS NOTES
Progress Note    Admit Date:  3/18/2025    Abdominal pain   Hematuria     Emphysematous cystitis   CHRISTINA   HyperK    Subjective:  Mr. Ma today seen resting in bed. Still having some suprapubic discomfort but it is improved  Still having bloody urine     Objective:   Patient Vitals for the past 4 hrs:   BP Temp Temp src Pulse Resp SpO2   03/19/25 0715 103/64 97.7 °F (36.5 °C) Axillary 85 17 97 %   03/19/25 0445 (!) 110/58 97.5 °F (36.4 °C) Oral 82 17 98 %          Intake/Output Summary (Last 24 hours) at 3/19/2025 0749  Last data filed at 3/19/2025 0732  Gross per 24 hour   Intake --   Output 300 ml   Net -300 ml       Physical Exam:    Gen: No distress. Alert. +Chronically ill elderly male   Eyes: PERRL. No sclera icterus. No conjunctival injection.    Neck: No JVD.  Trachea midline.  Resp: No accessory muscle use. No crackles. No wheezes. No rhonchi.   CV: Regular rate. Regular rhythm. No murmur.  No rub. +trace bilateral lower extremity pitting edema.   Peripheral Pulses: +2 palpable, equal bilaterally   GI: Non-tender. Non-distended.  Normal bowel sounds.  Skin: Warm and dry. No nodule on exposed extremities. No rash on exposed extremities. +some erythema of groin   M/S: No cyanosis. No joint deformity. No clubbing.   Neuro: Awake. Grossly nonfocal    Psych: Oriented to person and place. No anxiety or agitation.       I ELIZABETH ROSA MD have reviewed the chart on Oneil Ma and personally interviewed and examined patient, reviewed the data (labs and imaging) and after discussion with my PA formulated the plan.  Agree with note with the following edits.        I reviewed the patient's Past Medical History, Past Surgical History, Medications, and Allergies.     Physical exam:    /64   Pulse 85   Temp 97.7 °F (36.5 °C) (Axillary)   Resp 17   Ht 1.676 m (5' 6\")   Wt 72 kg (158 lb 12.8 oz)   SpO2 97%   BMI 25.63 kg/m²     Gen: No distress. Alert.   Eyes: PERRL. No sclera icterus. No  output   -plan to place esparza today, bladder scan pending     #Elevated troponin   -104-->88  -EKG without acute ischemic changes   -likely 2/2 to renal function    #Dysphagia   #Sensation of food getting stuck  #Unintentional weight loss  -swallow eval pending   -GI consult     #Chronic systolic HF   #SSS s/p biventricular ICD   -last echo from 2022 as above with EF 45%  -on coreg, losartan, aldactone   -holding meds for now     #Thrombocytosis   -likely reactive 2/2 to above     #Chronic back pain   -follows with ortho    Did discuss plan above with daughter     DVT Prophylaxis: SCDs  Diet: ADULT DIET; Regular  Code Status: DNR-CCA    ABAD Hartley  03/19/25  8:46 AM    VERONICA Lockett.

## 2025-03-19 NOTE — PROGRESS NOTES
Progress Note    HISTORY     CC:  Cystitis           We are following for acute kidney injury        Subjective/   HPI:  Potassium better but remains high.  IV fluids going with no edema or SOB.  Urology placed esparza and he is having 300 cc of bloody urine.  Family at bedside     ROS:  Constitutional:  No fevers, No Chills, + weakness  Cardiovascular:  No palpations, no edema  Respiratory:  No wheezing, no cough  Skin:  No rash, no itching  :  + hematuria, + dysuria     Social Hx:  family at the bedside     Past Medical and Surgical History:  - Reviewed, no changes     EXAM       Objective/     Vitals:    03/19/25 0445 03/19/25 0715 03/19/25 1030 03/19/25 1059   BP: (!) 110/58 103/64 (!) 117/47    Pulse: 82 85 60    Resp: 17 17 16 16   Temp: 97.5 °F (36.4 °C) 97.7 °F (36.5 °C) 97.7 °F (36.5 °C)    TempSrc: Oral Axillary Oral    SpO2: 98% 97% 92%    Weight:       Height:         24HR INTAKE/OUTPUT:    Intake/Output Summary (Last 24 hours) at 3/19/2025 1506  Last data filed at 3/19/2025 1347  Gross per 24 hour   Intake 480 ml   Output 300 ml   Net 180 ml     Constitutional:  NAD  Eyes:  Pupils reactive, sclera clear   Neck:  Normal thyroid, no masses   Cardiovascular:  Regular, no rub  Respiratory:  No distress, no wheezing  Psychiatry:  Appropriate mood/affect, alert  Abdomen: +bs, soft, nt, no masses   Musculoskeletal: No LE edema, no clubbing   Lymphatics:  No LAD in neck, no supraclavicular nodes       MEDICAL DECISION MAKING       Data/  Recent Labs     03/18/25  1352 03/19/25  0508   WBC 10.7 11.9*   HGB 11.8* 11.7*   HCT 36.1* 36.9*   MCV 96.7 98.8   * 384     Recent Labs     03/18/25  1352 03/19/25  0040 03/19/25  0509 03/19/25  0918   * 138 138  --    K 6.1* 5.5* 5.8* 5.7*    105 107  --    CO2 21 22 17*  --    GLUCOSE 311* 123* 120*  --    BUN 49* 44* 44*  --    CREATININE 2.6* 2.7* 2.6*  --    LABGLOM 22* 21* 22*  --        Assessment/     Acute Kidney Injury:  KDIGO stage  2  Etiology:  Concern for urinary retention vs. ATN due to cystitis  Hyperkalemia:  More common in [place] due to obstruction.  CT did not show hydronephrosis but did have a distended bladder.  Also on spironolactone and losartan at home  CHF:  EF = 30% / compensated with no edema.  On room air.  Not short of breath  Hematuria/Cystitis:  On IV antibiotics.  Urology consulted and esparza placed.  Cultures + for E. Coli and Proteus Mirabilis     Plan/     Lokelma   IV fluids with HCO3   Follow in/outs  IV antibiotics       Thank you for asking us to participate in the management of your patient, please do not hesitate to contact me for any concerns regarding my recommendations as outlined above.    -----------------------------  Zuleika Bonds M.D.   [facility]

## 2025-03-19 NOTE — PROGRESS NOTES
Speech Language Pathology  Swallowing Disorders and Dysphagia  Clinical Bedside Swallow Assessment  Facility/Department: [facility]    Instrumentation: Yes. MBSS is warranted to further assess oropharyngeal structures and function  Diet recommendation: IDDSI 6 Soft and Bite Sized Solids; IDDSI 0 Thin Liquids small, single sips; Meds PO as tolerated, consider whole in puree  Risk management: upright for all intake, stay upright for at least 30 mins after intake, small bites/sips, 1:1 supervision with intake, oral care 2-3x/day to reduce adverse affects in the event of aspiration, increase physical mobility as able, alternate bites/sips, slow rate of intake, general GERD precautions, general aspiration precautions, and hold PO and contact SLP if s/s of aspiration or worsening respiratory status develop.    NAME:Oneil Ma  : 1932 (89 y.o.)   MRN: [id number]  ROOM: /0322-01  ADMISSION DATE: 3/18/2025  Chief Complaint   Patient presents with    Hematuria     Pt arrived by EMS from home with c/o lower abd pain with blood in urine. Pt reports also reports lower back pain. Pt wears depends and c/o urinary incontinence. Pt dirty of stool and urine upon arrival.       Past Medical History:   Diagnosis Date    Arthritis     Bradycardia     OA (osteoarthritis)     PE (pulmonary embolism)     Slow heart rate     had pm    Wears glasses      Past Surgical History:   Procedure Laterality Date    CARDIAC SURGERY  2011    pacemaker, defib    CARPAL TUNNEL RELEASE  1/10/12    Right    CYST REMOVAL      OFF BACK    EYE SURGERY      bilateral cataracts    OTHER SURGICAL HISTORY Left 2016    excision of skin cancer of hand    OTHER SURGICAL HISTORY Left 08/15/2017    excision of lesions times 2, left ear    SKIN BIOPSY  2012    excision lesion behind the right ear         DATE ONSET: 3/18/2025    Date of Evaluation: 3/19/2025   Evaluating Therapist: MARIO ALBERTO Allan    Chart Reviewed:  no clinical s/s of aspiration    IDDSI 5 (minced and moist): DNT    IDDSI 6 (soft and bite sized): DNT    IDDSI 7 (regular): no anterior bolus loss , suspect reduced/impaired A-P bolus transit, swallow timing subjectively appears timely, prolonged mastication, prolonged oral phase, oral stasis noted post swallow, and no clinical s/s of aspiration. Pt requires increased time for mastication and multiple liquid washes d/t dry mouth. Cough observed with liquids wash of soft and bite-sized solids. See treatment note below    3 oz water: DNT as pt habitually uses single sips      RR following trials: 16    Impressions:    Patient presents as moderate aspiration risk and low PNA / adverse pulmonary event risk given the following factors via BOLUS Framework (TIP Diaz. & Dom, A.H. (2021):  hx GERD / GI disease affecting oropharyngeal swallowing  reduced ambulation     Clinical signs of oropharyngeal dysphagia likely subacute related to reduced physical mobility and the aging swallow. Swallow prognosis is good. Instrumental swallow study is indicated.  Given good oral care status and caregiver support, pt is safe for oral diet at this time from SLP standpoint. Correlate with MD.     Instrumentation: Yes. MBSS is warranted to further assess oropharyngeal structures and function  Diet recommendation: IDDSI 6 Soft and Bite Sized Solids; IDDSI 0 Thin Liquids small, single sips; Meds PO as tolerated, consider whole in puree  Risk management: upright for all intake, stay upright for at least 30 mins after intake, small bites/sips, 1:1 supervision with intake, oral care 2-3x/day to reduce adverse affects in the event of aspiration, increase physical mobility as able, alternate bites/sips, slow rate of intake, general GERD precautions, general aspiration precautions, and hold PO and contact SLP if s/s of aspiration or worsening respiratory status develop.    Prognosis: Good    Recommended Intervention:   Dysphagia

## 2025-03-19 NOTE — CARE COORDINATION
Spoke to family, they state they would like for pt to DC to [facility] in [place] at [place]. They are going to speak to pt regarding this DC plan. They believe pt will be agreeable. Pt has family that works at facility.     Referral called Jermain (admissions) [phone]. He will review chart and return call with determination

## 2025-03-19 NOTE — DISCHARGE INSTRUCTIONS
Heart Failure Resources:  Heart Failure Interactive Workbook:  Go to https://ZipideeitalEverTrue.LyricFind/publication/?v=690306 for a Free Heart Failure Interactive Workbook provided by The American Heart Association. This interactive workbook will provide information on Healthier Living with Heart Failure. Please copy and paste link into search bar. Use your mouse to scroll through the pages.    HF [place] nick:   Heart Failure Free smart phone nick available for iPhone and Android download. Use your phone to track sodium intake, fluid intake, symptoms, and weight.     Low Sodium Diet / Recipes:  Go to www.[employer] website for “renal” diet which is Low Sodium! [employer] is a dialysis company, but this website offers free seasonal cookbooks. Each quarter, they will release 25-30 new recipes with a breakdown of calories, sodium, and glucose. You can also go to wwwSand Technology/recipes website for free recipes.     Discharge Instruction Video:  Scan the QR code below with your camera and click the canva.com link to open the video and watch educational information on Heart Failure and Medications from one of our nurses.   [email or web address]/design/DAFZnsH_JRk/1EtplwoYWXThzTCflO2yfz/edit    Home Exercise Program:   Identification of Green/Yellow/Red zones:  You should be able to identify when you feel good (green zone), if you have 1-2 symptoms of HF (yellow zone), or if you are in need of medical attention (red zone).  In your CHF education folder you were provided a “stop light tool” to outline this information.     We want to you to rate your exertion levels:    Our therapy team has discussed means of identification with you such as the \"Alhaji scale.\"  The Alhaji rating scale ranges from 6 to 20, where 6 means \"no exertion at all\" and 20 means \"maximal exertion.\" The goal is to use this to gauge how much effort it is taking for you to do your normal daily tasks.   You should be able to recognize when too much exertion is

## 2025-03-19 NOTE — ED NOTES
Male purewick in place, pt resting I bed and family at bedside for support. Pt placed in position of comfort and waiting on admission orders.

## 2025-03-19 NOTE — H&P
Hospital Medicine History & Physical      Date of Admission: 3/18/2025    Date of Service:  Pt seen/examined on 3/18/2025    [x]Admitted to Inpatient with expected LOS greater than two midnights due to medical therapy.  []Placed in Observation status.    Chief Admission Complaint: Pelvic pain    Presenting Admission History:      89 y.o. male who presented to [facility] with pelvic pain and found to have emphysematous cystitis.  PMHx significant for ischemic cardiomyopathy, status post biventricular ICD, chronic HFrEF, history of complete heart block, wheelchair-bound, type 2 diabetes.      Patient reports that today, he started develop lower abdominal pain and spasms.  Also noted some blood in his urine as well as lower back pain.  He has chronic urinary incontinence and is wheelchair-bound at baseline.  Patient Nuys any fevers or chills.  Denies any nausea or vomiting.  He declined any symptoms of dysuria.  On my exam, he feels well but per daughter at bedside, she notes that he really does not complain much of pain but you can tell he becomes uncomfortable at times.  He was comfortable my exam.  No other acute complaints.    In the ED, vital signs stable.  Sugars were elevated.  BMP revealed sodium 132, potassium 6.1, BUN 49 with creatinine of 2.6 and glucose of 311.  Troponins were flat.  proBNP elevated at 1204.  CBC showed hemoglobin 11.8, platelet count 458, otherwise unremarkable.  UA with large blood in urine, over 300 protein, positive nitrite and large leukocyte esterase.    CT abdomen pelvis showed emphysematous cystitis.  Chest x-ray with no acute process.  ED tried to place Miller catheter x 2 without success.  Patient was given Lokelma and hyper-K cocktail.  Nephrology was consulted.    Assessment/Plan:      Current Principal Problem:  Emphysematous cystitis    #Emphysematous cystitis:  # Acute cystitis with hematuria  -Urology consulted for Miller catheter placement as it was unable to be placed  arthrosis is noted.     Emphysematous cystitis.  Recommend correlation with urine analysis.     XR CHEST PORTABLE  Result Date: 3/18/2025  EXAMINATION: ONE XRAY VIEW OF THE CHEST 3/18/2025 1:25 pm COMPARISON: None. HISTORY: ORDERING SYSTEM PROVIDED HISTORY: Abdominal pain TECHNOLOGIST PROVIDED HISTORY: Reason for exam:->Abdominal pain Reason for Exam: abdominal pain FINDINGS: Transvenous pacer in place. The lungs are without acute focal process.  There is no effusion or pneumothorax. The cardiomediastinal silhouette is without acute process. The osseous structures are without acute process.  Elevated right hemidiaphragm.     No acute process.       PCP: Shaun Mendoza MD    Past Medical History:        Diagnosis Date    Arthritis     Bradycardia     OA (osteoarthritis)     PE (pulmonary embolism) 1991    Slow heart rate 2011    had pm    Wears glasses        Past Surgical History:        Procedure Laterality Date    CARDIAC SURGERY  09/20/2011    pacemaker, defib    CARPAL TUNNEL RELEASE  1/10/12    Right    CYST REMOVAL      OFF BACK    EYE SURGERY      bilateral cataracts    OTHER SURGICAL HISTORY Left 12/20/2016    excision of skin cancer of hand    OTHER SURGICAL HISTORY Left 08/15/2017    excision of lesions times 2, left ear    SKIN BIOPSY  5/17/2012    excision lesion behind the right ear       Medications Prior to Admission:   Prior to Admission medications    Medication Sig Start Date End Date Taking? Authorizing Provider   ciprofloxacin (CIPRO) 250 MG tablet Take 1 tablet by mouth 2 times daily for 7 days 3/17/25 3/24/25  Cee Lucas APRN - CNP   pantoprazole (PROTONIX) 20 MG tablet TAKE 1 TABLET BY MOUTH DAILY 2/6/25   Melanie Perry MD   methylPREDNISolone (MEDROL DOSEPACK) 4 MG tablet Use as directed per package instructions 1/21/25   Cee Lucas APRN - CNP   Control Gel Formula Dressing (DUODERM SIGNAL DRESSING) MISC Apply dressing over the pressure injuries and  change every 3 days. 3/26/24   Shaun Mendoza MD   Diaper Rash Products (DESITIN MULTI-PURPOSE HEALING) OINT Apply to buttocks region two times a day 12/5/22   Cee Lucas APRN - CNP   diclofenac sodium (VOLTAREN) 1 % GEL APPLY 2 GRAMS TO AFFECTED AREA(S) TWO TIMES A DAY 5/26/20   Glenn, MD Jose   atorvastatin (LIPITOR) 10 MG tablet  5/16/18   ProviderYesi MD   diphenhydrAMINE-APAP, sleep, (TYLENOL PM EXTRA STRENGTH)  MG tablet Take 1 tablet by mouth nightly as needed for Sleep    Yesi Dyson MD   spironolactone (ALDACTONE) 25 MG tablet Take 1 tablet by mouth daily    Yesi Dyson MD   carvedilol (COREG) 12.5 MG tablet Take 1 tablet by mouth 2 times daily (with meals)    Yesi Dyson MD   losartan (COZAAR) 25 MG tablet Take 1 tablet by mouth daily    Yesi Dyson MD   aspirin 81 MG tablet Take 1 tablet by mouth 2 times daily    Yesi Dyson MD   therapeutic multivitamin-minerals (THERAGRAN-M) tablet Take 1 tablet by mouth daily    Yesi Dyson MD       Labs: Personally reviewed and interpreted for clinical significance.   Recent Labs     03/18/25  1352   WBC 10.7   HGB 11.8*   HCT 36.1*   *     Recent Labs     03/18/25  1352   *   K 6.1*      CO2 21   BUN 49*   CREATININE 2.6*   CALCIUM 9.0     Recent Labs     03/18/25  1352 03/18/25  1639   PROBNP 1,204*  --    TROPHS 104* 88*     No results for input(s): \"LABA1C\" in the last 72 hours.  Recent Labs     03/18/25  1352   AST 14*   ALT 11   BILITOT 0.3   ALKPHOS 125     Recent Labs     03/18/25  1352   INR 1.08   LACTA 1.7        Prince Fuller MD

## 2025-03-19 NOTE — PROGRESS NOTES
Patient admitted to room 322-1 from ED. Patient oriented to room, call light, bed rails, phone, lights and bathroom. Patient instructed about the schedule of the day including: vital sign frequency, lab draws, possible tests, frequency of MD and staff rounds, daily weights, I &O's and prescribed diet. Telemetry box in place, patient aware of placement and reason. Bed locked, in lowest position, side rails up 2/4, call light within reach.        Recliner Assessment  Patient is able to demonstrate the ability to move from a reclining position to an upright position within the recliner.       4 Eyes Skin Assessment     NAME:  Oneil Ma  YOB: 1932  MEDICAL RECORD NUMBER:  [id number]    The patient is being assessed for  Admission    I agree that at least one RN has performed a thorough Head to Toe Skin Assessment on the patient. ALL assessment sites listed below have been assessed.      Areas assessed by both nurses:    Head, Face, Ears, Shoulders, Back, Chest, Arms, Elbows, Hands, Sacrum. Buttock, Coccyx, Ischium, Legs. Feet and Heels, and Under Medical Devices                   Does the Patient have a Wound? Yes wound(s) were present on assessment. LDA wound assessment was Initiated and completed by RN       Kyle Prevention initiated by RN: Yes  Wound Care Orders initiated by RN: Yes    Pressure Injury (Stage 3,4, Unstageable, DTI, NWPT, and Complex wounds) if present, place Wound referral order by RN under : No    New Ostomies, if present place, Ostomy referral order under : No     Nurse 1 eSignature: Electronically signed by Dasha Salgado RN on 3/19/25 at 12:32 AM EDT    **SHARE this note so that the co-signing nurse can place an eSignature**    Nurse 2 eSignature: Electronically signed by Arturo Sellers RN on 3/19/25 at 12:38 AM EDT

## 2025-03-19 NOTE — PROCEDURES
[facility] Procedure Note      Provider: MARIFER Millan CNP Patient ID:  Admission Date: 3/18/2025 Name: Oneil Ma   MRN: [id number]   Patient Location: /0322-01 : 1932  Attending: Britany Mares DO Date of Service: 3/19/2025  PCP: Shaun Mendoza MD     Diagnoses:  Emphysematous cystitis    Procedure:   1. Insertion of esparza catheter - complicated     Findings:   Hematuria  Significant amount of air expelled    Indication for Procedure:  The patient was informed of the need for bladder drainage based on CT image with large amount of gas within the bladder and emphysematous cystitis.  We had a discussion of the procedure. He had a chance to ask questions which were answered prior to the catheterization.     Procedure Details:  The urethral meatus was prepped and draped in the usual fashion. Viscous lidocaine jelly (2%) was instilled retrograde via the urethra. A 16F Esparza catheter was then passed into the bladder. He had return of 200mL of red urine. 10 mL of sterile water was instilled into the Esparza balloon. A stat lock was applied to the left thigh. He tolerated the procedure well.    EBL:   1 mL    Plan:  See Progress Notes for urology plan regarding Esparza    MARIFER Millan CNP  3/19/2025

## 2025-03-19 NOTE — CONSULTS
Reason for Consult: emphysematous cystitis, unable to place esparza in ED    History of Present Illness: Oneil Ma is a 89 y.o. male with PMH osteoarthritis, pulmonary embolism, bradycardia who came to the hospital for hematuria that started on Saturday. Also having more uncomfortable urination. CT abdomen/pelvis with emphysematous cystitis, significant amount of gas within the bladder. I was able to place a 16F esparza catheter with ease using lidocaine gel. Large amount of air expelled and a total of about 200cc hematuria was present. His abdominal distention has resolved s/p catheter placement.       ROS:  General: no fever or chills  CV: No chest pain  Pulm: No SOB  GI: No nausea, vomiting, diarrhea or constipation  Skin: No rash  Neuro: No headaches, dizziness or neurologic symptoms  : as above  Hematologic: no complaints  Endocrine: no complaints  Psych: no complaints    Past Medical History:   Past Medical History:   Diagnosis Date    Arthritis     Bradycardia     OA (osteoarthritis)     PE (pulmonary embolism) 1991    Slow heart rate 2011    had pm    Wears glasses        Past Surgical History:  Past Surgical History:   Procedure Laterality Date    CARDIAC SURGERY  09/20/2011    pacemaker, defib    CARPAL TUNNEL RELEASE  1/10/12    Right    CYST REMOVAL      OFF BACK    EYE SURGERY      bilateral cataracts    OTHER SURGICAL HISTORY Left 12/20/2016    excision of skin cancer of hand    OTHER SURGICAL HISTORY Left 08/15/2017    excision of lesions times 2, left ear    SKIN BIOPSY  5/17/2012    excision lesion behind the right ear       Social History:  Social History     Socioeconomic History    Marital status:      Spouse name: Not on file    Number of children: Not on file    Years of education: Not on file    Highest education level: Not on file   Occupational History    Not on file   Tobacco Use    Smoking status: Never    Smokeless tobacco: Never   Vaping Use    Vaping status: Never

## 2025-03-19 NOTE — PROGRESS NOTES
Transferred care to ISMAEL Scruggs. Face to face bedside report given, no need voiced at this time.   Pt awake in bed.   No signs of distress noted.  Call light within reach.   Denies needs.

## 2025-03-19 NOTE — PROCEDURES
SPEECH/LANGUAGE PATHOLOGY  Swallowing Disorders and Dysphagia  VIDEOFLUOROSCOPIC STUDY OF SWALLOWING (VFSS) / Modified Barium Swallow Study (MBSS)  [facility]      Diet Recommendation: IDDSI 6 Soft and Bite Sized Solids ; IDDSI 0 Thin Liquids - SMALL SIPS--NO straws ; Meds whole in puree  Risk Management: upright for all intake, stay upright for at least 30 mins after intake, small bites/sips, 1:1 supervision with intake, oral care 2-3x/day to reduce adverse affects in the event of aspiration, increase physical mobility as able, alternate bites/sips as needed for dry mouth, consider gravies and sauces, slow rate of intake, general GERD precautions, general aspiration precautions, and hold PO and contact SLP if s/s of aspiration or worsening respiratory status develop.  Specialist Referrals: GI  Ancillary Tests: Could consider EGD given findings in upper esophageal region (see images below and radiologist's report) and considering reports of weight loss        PATIENT NAME:  Oneil Ma      :  1932    Room: /0322-01   TODAY'S DATE:  3/19/2025    [x]The admitting diagnosis and active problem list, as listed below have been reviewed prior to initiation of this evaluation.     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]  Distended bladder [N32.89]  Acute renal insufficiency [N28.9]  Failure to thrive in adult [R62.7]  Acute cystitis with hematuria [N30.01]  Hyperglycemia due to diabetes mellitus (HCC) [E11.65]  Chronic heart failure with reduced ejection fraction (HFrEF, <= 40%) (formerly [facility]) [I50.22]     ACTIVE PROBLEM LIST:   Patient Active Problem List   Diagnosis    Arthritis    Bradycardia    Wears glasses    Slow heart rate    Cervical spine arthritis    Squamous cell skin cancer    Neoplasm of uncertain behavior of skin    Venous insufficiency    Ischemic cardiomyopathy    Type 2 diabetes mellitus with other specified  considering reports of weight loss  Goals: Tolerate LRD  Follow-up exam: Will f/u per original POC    Diagnosis Code: R13.12- oropharyngeal dysphagia    Education   SLP educated the patient re: Role of SLP, rationale for completion of assessment, anatomical components of swallow structures as they pertain to airway protection, results of assessment, recommendations, POC, and rationale for MBS  Response to education:   [x] Verbalized Understanding  [x] Demonstrated Understanding  [] No evidence of learning  [x] Ongoing education is recommended  [x] Caregiver aware of results and recommendations  [x] RN aware of results and recommendations    Goals  Short Term Goals  Timeframe for short term goals: 5 days (03/24/2025)  Goal 1: The patient will tolerate instrumental assessment when able  03/19/2025 GOAL MET  Goal 2: The patient/caregiver will demonstrate understanding of compensatory swallow strategies, for improved swallow safety 3/19/2025 : Goal addressed, see above. Ongoing, progressing.  Goal 3: The patient will tolerate recommended diet with no clinical s/s of aspiration 5/5 3/19/2025 : Goal addressed, see above. Ongoing, progressing.        Long Term Goals  Timeframe for Long term goals 7 days (03/26/2025)  Goal 1: The patient will tolerate least restrictive diet with no clinical s/s of aspiration or worsening respiratory/pulmonary status 3/19/2025 : Goal addressed, see above. Ongoing, progressing.        Pain: The patient does not complain of pain    Patient seen for inpatient MBS. Referring provider will be notified of results and recommendations. Please don't hesitate to contact me at [phone] with questions or concerns.      Therapy Time:    Individual   Time In  1445   Time Out  1507   Minutes  22 min/1 unit      Signature:  Sherrell Carr M.A. SLP  Speech-Language Pathologist  [place] Lic #[id number]  [phone]

## 2025-03-20 ENCOUNTER — ANESTHESIA (OUTPATIENT)
Dept: ENDOSCOPY | Age: 89
End: 2025-03-20
Payer: MEDICARE

## 2025-03-20 LAB
ANION GAP SERPL CALCULATED.3IONS-SCNC: 11 MMOL/L (ref 3–16)
ANION GAP SERPL CALCULATED.3IONS-SCNC: 12 MMOL/L (ref 3–16)
ANION GAP SERPL CALCULATED.3IONS-SCNC: 8 MMOL/L (ref 3–16)
ANION GAP SERPL CALCULATED.3IONS-SCNC: 9 MMOL/L (ref 3–16)
BASOPHILS # BLD: 0.1 K/UL (ref 0–0.2)
BASOPHILS NFR BLD: 0.6 %
BUN SERPL-MCNC: 32 MG/DL (ref 7–20)
BUN SERPL-MCNC: 32 MG/DL (ref 7–20)
BUN SERPL-MCNC: 36 MG/DL (ref 7–20)
BUN SERPL-MCNC: 39 MG/DL (ref 7–20)
CALCIUM SERPL-MCNC: 8.1 MG/DL (ref 8.3–10.6)
CALCIUM SERPL-MCNC: 8.1 MG/DL (ref 8.3–10.6)
CALCIUM SERPL-MCNC: 8.2 MG/DL (ref 8.3–10.6)
CALCIUM SERPL-MCNC: 8.6 MG/DL (ref 8.3–10.6)
CHLORIDE SERPL-SCNC: 100 MMOL/L (ref 99–110)
CHLORIDE SERPL-SCNC: 101 MMOL/L (ref 99–110)
CHLORIDE SERPL-SCNC: 102 MMOL/L (ref 99–110)
CHLORIDE SERPL-SCNC: 98 MMOL/L (ref 99–110)
CO2 SERPL-SCNC: 23 MMOL/L (ref 21–32)
CO2 SERPL-SCNC: 26 MMOL/L (ref 21–32)
CO2 SERPL-SCNC: 27 MMOL/L (ref 21–32)
CO2 SERPL-SCNC: 27 MMOL/L (ref 21–32)
CREAT SERPL-MCNC: 1.8 MG/DL (ref 0.8–1.3)
CREAT SERPL-MCNC: 1.9 MG/DL (ref 0.8–1.3)
CREAT SERPL-MCNC: 2 MG/DL (ref 0.8–1.3)
CREAT SERPL-MCNC: 2.4 MG/DL (ref 0.8–1.3)
DEPRECATED RDW RBC AUTO: 14.3 % (ref 12.4–15.4)
EOSINOPHIL # BLD: 0.3 K/UL (ref 0–0.6)
EOSINOPHIL NFR BLD: 3.2 %
GFR SERPLBLD CREATININE-BSD FMLA CKD-EPI: 25 ML/MIN/{1.73_M2}
GFR SERPLBLD CREATININE-BSD FMLA CKD-EPI: 31 ML/MIN/{1.73_M2}
GFR SERPLBLD CREATININE-BSD FMLA CKD-EPI: 33 ML/MIN/{1.73_M2}
GFR SERPLBLD CREATININE-BSD FMLA CKD-EPI: 35 ML/MIN/{1.73_M2}
GLUCOSE BLD-MCNC: 115 MG/DL (ref 70–99)
GLUCOSE BLD-MCNC: 122 MG/DL (ref 70–99)
GLUCOSE BLD-MCNC: 142 MG/DL (ref 70–99)
GLUCOSE BLD-MCNC: 174 MG/DL (ref 70–99)
GLUCOSE BLD-MCNC: 269 MG/DL (ref 70–99)
GLUCOSE SERPL-MCNC: 105 MG/DL (ref 70–99)
GLUCOSE SERPL-MCNC: 145 MG/DL (ref 70–99)
GLUCOSE SERPL-MCNC: 155 MG/DL (ref 70–99)
GLUCOSE SERPL-MCNC: 164 MG/DL (ref 70–99)
HCT VFR BLD AUTO: 29.3 % (ref 40.5–52.5)
HCT VFR BLD AUTO: 33.1 % (ref 40.5–52.5)
HCT VFR BLD AUTO: 34 % (ref 40.5–52.5)
HGB BLD-MCNC: 10.7 G/DL (ref 13.5–17.5)
HGB BLD-MCNC: 11.2 G/DL (ref 13.5–17.5)
HGB BLD-MCNC: 9.9 G/DL (ref 13.5–17.5)
LYMPHOCYTES # BLD: 1.8 K/UL (ref 1–5.1)
LYMPHOCYTES NFR BLD: 17.7 %
MCH RBC QN AUTO: 31.9 PG (ref 26–34)
MCHC RBC AUTO-ENTMCNC: 33 G/DL (ref 31–36)
MCV RBC AUTO: 96.8 FL (ref 80–100)
MONOCYTES # BLD: 1.4 K/UL (ref 0–1.3)
MONOCYTES NFR BLD: 13.7 %
NEUTROPHILS # BLD: 6.7 K/UL (ref 1.7–7.7)
NEUTROPHILS NFR BLD: 64.8 %
PERFORMED ON: ABNORMAL
PLATELET # BLD AUTO: 368 K/UL (ref 135–450)
PMV BLD AUTO: 7.6 FL (ref 5–10.5)
POTASSIUM SERPL-SCNC: 4.5 MMOL/L (ref 3.5–5.1)
POTASSIUM SERPL-SCNC: 4.5 MMOL/L (ref 3.5–5.1)
POTASSIUM SERPL-SCNC: 4.7 MMOL/L (ref 3.5–5.1)
POTASSIUM SERPL-SCNC: 4.8 MMOL/L (ref 3.5–5.1)
RBC # BLD AUTO: 3.51 M/UL (ref 4.2–5.9)
SODIUM SERPL-SCNC: 135 MMOL/L (ref 136–145)
SODIUM SERPL-SCNC: 135 MMOL/L (ref 136–145)
SODIUM SERPL-SCNC: 137 MMOL/L (ref 136–145)
SODIUM SERPL-SCNC: 137 MMOL/L (ref 136–145)
WBC # BLD AUTO: 10.3 K/UL (ref 4–11)

## 2025-03-20 PROCEDURE — 36415 COLL VENOUS BLD VENIPUNCTURE: CPT

## 2025-03-20 PROCEDURE — 3609017100 HC EGD: Performed by: INTERNAL MEDICINE

## 2025-03-20 PROCEDURE — 6360000002 HC RX W HCPCS: Performed by: NURSE ANESTHETIST, CERTIFIED REGISTERED

## 2025-03-20 PROCEDURE — 3700000000 HC ANESTHESIA ATTENDED CARE: Performed by: INTERNAL MEDICINE

## 2025-03-20 PROCEDURE — 7100000010 HC PHASE II RECOVERY - FIRST 15 MIN: Performed by: INTERNAL MEDICINE

## 2025-03-20 PROCEDURE — 2500000003 HC RX 250 WO HCPCS: Performed by: INTERNAL MEDICINE

## 2025-03-20 PROCEDURE — 3700000001 HC ADD 15 MINUTES (ANESTHESIA): Performed by: INTERNAL MEDICINE

## 2025-03-20 PROCEDURE — 85014 HEMATOCRIT: CPT

## 2025-03-20 PROCEDURE — 2580000003 HC RX 258: Performed by: INTERNAL MEDICINE

## 2025-03-20 PROCEDURE — 2060000000 HC ICU INTERMEDIATE R&B

## 2025-03-20 PROCEDURE — 80048 BASIC METABOLIC PNL TOTAL CA: CPT

## 2025-03-20 PROCEDURE — 85018 HEMOGLOBIN: CPT

## 2025-03-20 PROCEDURE — 97162 PT EVAL MOD COMPLEX 30 MIN: CPT

## 2025-03-20 PROCEDURE — 85025 COMPLETE CBC W/AUTO DIFF WBC: CPT

## 2025-03-20 PROCEDURE — 2580000003 HC RX 258: Performed by: NURSE ANESTHETIST, CERTIFIED REGISTERED

## 2025-03-20 PROCEDURE — 6360000002 HC RX W HCPCS: Performed by: INTERNAL MEDICINE

## 2025-03-20 PROCEDURE — 2500000003 HC RX 250 WO HCPCS: Performed by: STUDENT IN AN ORGANIZED HEALTH CARE EDUCATION/TRAINING PROGRAM

## 2025-03-20 PROCEDURE — 99232 SBSQ HOSP IP/OBS MODERATE 35: CPT | Performed by: INTERNAL MEDICINE

## 2025-03-20 PROCEDURE — 7100000011 HC PHASE II RECOVERY - ADDTL 15 MIN: Performed by: INTERNAL MEDICINE

## 2025-03-20 PROCEDURE — 97530 THERAPEUTIC ACTIVITIES: CPT

## 2025-03-20 PROCEDURE — 0DJ08ZZ INSPECTION OF UPPER INTESTINAL TRACT, VIA NATURAL OR ARTIFICIAL OPENING ENDOSCOPIC: ICD-10-PCS | Performed by: STUDENT IN AN ORGANIZED HEALTH CARE EDUCATION/TRAINING PROGRAM

## 2025-03-20 PROCEDURE — 6370000000 HC RX 637 (ALT 250 FOR IP): Performed by: STUDENT IN AN ORGANIZED HEALTH CARE EDUCATION/TRAINING PROGRAM

## 2025-03-20 PROCEDURE — 2709999900 HC NON-CHARGEABLE SUPPLY: Performed by: INTERNAL MEDICINE

## 2025-03-20 RX ORDER — SODIUM CHLORIDE 9 MG/ML
INJECTION, SOLUTION INTRAVENOUS PRN
Status: DISCONTINUED | OUTPATIENT
Start: 2025-03-20 | End: 2025-03-20 | Stop reason: HOSPADM

## 2025-03-20 RX ORDER — SODIUM CHLORIDE 0.9 % (FLUSH) 0.9 %
5-40 SYRINGE (ML) INJECTION PRN
Status: DISCONTINUED | OUTPATIENT
Start: 2025-03-20 | End: 2025-03-20 | Stop reason: HOSPADM

## 2025-03-20 RX ORDER — SODIUM CHLORIDE 0.9 % (FLUSH) 0.9 %
5-40 SYRINGE (ML) INJECTION EVERY 12 HOURS SCHEDULED
Status: DISCONTINUED | OUTPATIENT
Start: 2025-03-20 | End: 2025-03-20 | Stop reason: HOSPADM

## 2025-03-20 RX ORDER — ONDANSETRON 2 MG/ML
4 INJECTION INTRAMUSCULAR; INTRAVENOUS
Status: DISCONTINUED | OUTPATIENT
Start: 2025-03-20 | End: 2025-03-20 | Stop reason: HOSPADM

## 2025-03-20 RX ORDER — OXYCODONE HYDROCHLORIDE 5 MG/1
5 TABLET ORAL PRN
Status: DISCONTINUED | OUTPATIENT
Start: 2025-03-20 | End: 2025-03-20 | Stop reason: HOSPADM

## 2025-03-20 RX ORDER — DIPHENHYDRAMINE HYDROCHLORIDE 50 MG/ML
12.5 INJECTION, SOLUTION INTRAMUSCULAR; INTRAVENOUS
Status: DISCONTINUED | OUTPATIENT
Start: 2025-03-20 | End: 2025-03-20 | Stop reason: HOSPADM

## 2025-03-20 RX ORDER — SODIUM CHLORIDE, SODIUM LACTATE, POTASSIUM CHLORIDE, CALCIUM CHLORIDE 600; 310; 30; 20 MG/100ML; MG/100ML; MG/100ML; MG/100ML
INJECTION, SOLUTION INTRAVENOUS
Status: DISCONTINUED | OUTPATIENT
Start: 2025-03-20 | End: 2025-03-20 | Stop reason: SDUPTHER

## 2025-03-20 RX ORDER — NALOXONE HYDROCHLORIDE 0.4 MG/ML
INJECTION, SOLUTION INTRAMUSCULAR; INTRAVENOUS; SUBCUTANEOUS PRN
Status: DISCONTINUED | OUTPATIENT
Start: 2025-03-20 | End: 2025-03-20 | Stop reason: HOSPADM

## 2025-03-20 RX ORDER — LABETALOL HYDROCHLORIDE 5 MG/ML
10 INJECTION, SOLUTION INTRAVENOUS
Status: DISCONTINUED | OUTPATIENT
Start: 2025-03-20 | End: 2025-03-20 | Stop reason: HOSPADM

## 2025-03-20 RX ORDER — PROPOFOL 10 MG/ML
INJECTION, EMULSION INTRAVENOUS
Status: DISCONTINUED | OUTPATIENT
Start: 2025-03-20 | End: 2025-03-20 | Stop reason: SDUPTHER

## 2025-03-20 RX ORDER — OXYCODONE HYDROCHLORIDE 5 MG/1
10 TABLET ORAL PRN
Status: DISCONTINUED | OUTPATIENT
Start: 2025-03-20 | End: 2025-03-20 | Stop reason: HOSPADM

## 2025-03-20 RX ADMIN — PROPOFOL 40 MG: 10 INJECTION, EMULSION INTRAVENOUS at 15:00

## 2025-03-20 RX ADMIN — SODIUM CHLORIDE, POTASSIUM CHLORIDE, SODIUM LACTATE AND CALCIUM CHLORIDE: 600; 310; 30; 20 INJECTION, SOLUTION INTRAVENOUS at 14:55

## 2025-03-20 RX ADMIN — Medication 10 ML: at 20:19

## 2025-03-20 RX ADMIN — INSULIN LISPRO 2 UNITS: 100 INJECTION, SOLUTION INTRAVENOUS; SUBCUTANEOUS at 20:19

## 2025-03-20 RX ADMIN — SODIUM BICARBONATE: 84 INJECTION, SOLUTION INTRAVENOUS at 05:44

## 2025-03-20 RX ADMIN — MEROPENEM 500 MG: 500 INJECTION INTRAVENOUS at 22:21

## 2025-03-20 RX ADMIN — PHENYLEPHRINE HYDROCHLORIDE 300 MCG: 10 INJECTION INTRAVENOUS at 15:02

## 2025-03-20 RX ADMIN — SODIUM BICARBONATE: 84 INJECTION, SOLUTION INTRAVENOUS at 23:55

## 2025-03-20 RX ADMIN — OXYCODONE 5 MG: 5 TABLET ORAL at 18:39

## 2025-03-20 RX ADMIN — ACETAMINOPHEN 650 MG: 325 TABLET ORAL at 22:22

## 2025-03-20 RX ADMIN — MEROPENEM 500 MG: 500 INJECTION INTRAVENOUS at 11:48

## 2025-03-20 ASSESSMENT — PAIN SCALES - GENERAL
PAINLEVEL_OUTOF10: 0
PAINLEVEL_OUTOF10: 0

## 2025-03-20 ASSESSMENT — PAIN DESCRIPTION - LOCATION: LOCATION: HEAD

## 2025-03-20 ASSESSMENT — PAIN - FUNCTIONAL ASSESSMENT
PAIN_FUNCTIONAL_ASSESSMENT: 0-10
PAIN_FUNCTIONAL_ASSESSMENT: ACTIVITIES ARE NOT PREVENTED

## 2025-03-20 ASSESSMENT — PAIN DESCRIPTION - DESCRIPTORS
DESCRIPTORS: ACHING
DESCRIPTORS: OTHER (COMMENT)

## 2025-03-20 NOTE — PROGRESS NOTES
Pt Name: Oneil Ma  Medical Record Number: [id number]  Date of Birth 8/12/1932   Today's Date: 3/20/2025      Subjective:  awake in bed, urine clearing -yellow, family at bedside.     ROS: Constitutional: No fever    Vitals:  Vitals:    03/20/25 0405 03/20/25 0716 03/20/25 0917 03/20/25 1127   BP: (!) 98/52 (!) 82/40 (!) 127/100 (!) 110/53   Pulse: 87 74  87   Resp: 17 17 18   Temp: 97.3 °F (36.3 °C)   97.7 °F (36.5 °C)   TempSrc: Oral   Oral   SpO2: 95% 94%  95%   Weight: 73.5 kg (162 lb 0.6 oz)      Height:         I/O last 3 completed shifts:  In: 1812.7 [P.O.:720; I.V.:1092.7]  Out: 1400 [Urine:1400]    Exam:  General: Awake, oriented, no acute distress  Respiratory: Nonlabored breathing  Abdomen: Soft, non-tender, non-distended, no masses  : esparza catheter intact  Skin: Skin color, texture, turgor normal, no rashes or lesions  Neurologic: no gross deficits    CURRENT MEDICATIONS   Scheduled Meds:   meropenem  500 mg IntraVENous Q12H    sodium chloride flush  5-40 mL IntraVENous 2 times per day    insulin lispro  0-4 Units SubCUTAneous 4x Daily AC & HS    aspirin  81 mg Oral Daily    atorvastatin  10 mg Oral Daily    [Held by provider] losartan  25 mg Oral Daily    pantoprazole  20 mg Oral QAM AC    sodium zirconium cyclosilicate  10 g Oral Daily    [Held by provider] enoxaparin  30 mg SubCUTAneous Daily     Continuous Infusions:   sodium chloride      dextrose      sodium bicarbonate 75 mEq in sodium chloride 0.45 % 1,000 mL infusion 100 mL/hr at 03/20/25 0544     PRN Meds:.sodium chloride flush, sodium chloride, ondansetron **OR** ondansetron, polyethylene glycol, acetaminophen **OR** acetaminophen, glucose, dextrose bolus **OR** dextrose bolus, glucagon (rDNA), dextrose, oxyCODONE **OR** oxyCODONE    LABS     Recent Labs     03/18/25  1352 03/19/25  0040 03/19/25  0508 03/19/25  0509 03/19/25  1530 03/19/25  2343 03/20/25  0815   WBC 10.7  --  11.9*  --   --   --  10.3   HGB 11.8*  --  11.7*  --    --  11.1* 11.2*   HCT 36.1*  --  36.9*  --   --  34.5* 34.0*   *  --  384  --   --   --  368   *   < >  --    < > 133* 137 137   K 6.1*   < >  --    < > 5.5* 4.8 4.5      < >  --    < > 101 102 101   CO2 21   < >  --    < > 19* 23 27   BUN 49*   < >  --    < > 42* 39* 36*   CREATININE 2.6*   < >  --    < > 2.5* 2.4* 1.9*   CALCIUM 9.0   < >  --    < > 8.1* 8.1* 8.6   INR 1.08  --   --   --   --   --   --    AST 14*  --   --   --   --   --   --    ALT 11  --   --   --   --   --   --    BILITOT 0.3  --   --   --   --   --   --     < > = values in this interval not displayed.       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   Miller cahteter placed 3/19/25  Urine culture positive E. Coli and Proteus Mirabilis  PLAN   Continue culture specific antibiotics  Leave indwelling catheter in place x 1 week.     Urology will sign off, call with questions.     Clementine Estrada, MARIFER - CNP 3/20/2025 12:31 PM

## 2025-03-20 NOTE — FLOWSHEET NOTE
03/20/25 1613   Vitals   Temp 97.6 °F (36.4 °C)   Temp Source Axillary   Pulse 93   Heart Rate Source Monitor   Respirations 16   /67   MAP (Calculated) 83   BP Location Right upper arm   BP Upper/Lower Upper   BP Method Automatic   Patient Position Semi fowlers   Cardiac Rhythm Ventricular paced   Oxygen Therapy   SpO2 95 %   Pulse Oximeter Device Mode Continuous   Pulse Oximeter Device Location Left   O2 Device None (Room air)     Patient back from Endo. Vitals stable. Patient is A/O x4. Denies any pain. Patient repositioned in,no further needs expressed at this time,family at bedside.

## 2025-03-20 NOTE — CARE COORDINATION
Discharge Planning Note:    NEGRITO spoke with Anson at [facility] [phone].      Pt has been accepted and facility has already started pre-cert.       1515: Pre cert has been approved    Pt is currently having a procedure.  CM contacted Anson at [facility] and updated him that discharge will most likely be tomorrow.    Report # for [facility]: [phone]  Fax#: [phone]

## 2025-03-20 NOTE — CONSULTS
GASTROENTEROLOGY INPATIENT CONSULTATION        IDENTIFYING DATA/REASON FOR CONSULTATION   PATIENT:  Oneil Ma  MRN:  [id number]  ADMIT DATE: 3/18/2025  TIME OF EVALUATION: 3/20/2025 1:33 PM  HOSPITAL STAY:   LOS: 2 days     REASON FOR CONSULTATION:  Dysphagia     HISTORY OF PRESENT ILLNESS   Oneil Ma is a 89 y.o. male with a PMH of ischemic cardiomyopathy, status post biventricular ICD, chronic HFrEF, history of complete heart block, wheelchair-bound, type 2 diabetes who presented on 3/18/2025 with lower abdominal pain and found to have cystitis on CT with hematuria. We have been consulted for dysphagia.      [facility] [place] [facility] 3/19   IMPRESSION:  1. Positive silent tracheal aspiration of thin liquids when swallowing from a  straw.  2. Mild laryngeal penetration without tracheal aspiration when swallowing  thin liquids from a spoon and cup.  3. No evidence of laryngeal penetration or tracheal aspiration of nectar  thickened liquids, purees, or solids.  4. The combination of a mild cricopharyngeus bar and mild anterior esophageal  web at the C5-C6 level contribute to form a mild upper esophageal stenosis.  Please see separate speech pathology report for full discussion of findings  and recommendations  Patient states that he has problems with food sticking in particular pieces of bread or sandwiches he has to cut into very small pieces to get him to go down.  Seems to be predominantly sticking in the right side of his pharynx.  Patient denies any heartburn or indigestion.    Prior Endoscopic Evaluations: none located    PAST MEDICAL, SURGICAL, FAMILY, and SOCIAL HISTORY     Past Medical History:   Diagnosis Date    Arthritis     Bradycardia     OA (osteoarthritis)     PE (pulmonary embolism) 1991    Slow heart rate 2011    had pm    Wears glasses      Past Surgical History:   Procedure Laterality Date    CARDIAC SURGERY  09/20/2011    pacemaker, defib    CARPAL TUNNEL RELEASE  1/10/12    Right    CYST REMOVAL      OFF  Contrast? None   Final Result   Emphysematous cystitis.  Recommend correlation with urine analysis.         XR CHEST PORTABLE   Final Result   No acute process.               ASSESSMENT AND RECOMMENDATIONS   Oneil Ma is a 89 y.o. male with a PMH of ischemic cardiomyopathy, status post biventricular ICD, chronic HFrEF, history of complete heart block, wheelchair-bound, type 2 diabetes who presented on 3/18/2025 with lower abdominal pain and found to have cystitis on CT with hematuria. We have been consulted for dysphagia.      IMPRESSION:    Dysphagia      RECOMMENDATIONS:    EGD today with Dr. Baltazar   Keep NPO   Continue PPI    If you have any questions or need any further information, please feel free to contact our consult team or reach out via Wi3.  Thank you for allowing us to participate in the care of Oneil Ma.    The note was completed using Dragon voice recognition transcription. Every effort was made to ensure accuracy; however, inadvertent transcription errors may be present despite my best efforts to edit errors.

## 2025-03-20 NOTE — PROGRESS NOTES
Patient assessment as noted per flowsheet  -  alert and oriented, IVF infusing as ordered -  repositioned for comfort, purewick catheter w/ hematuric urine.  Call light in patient's reach.

## 2025-03-20 NOTE — PROGRESS NOTES
Progress Note    Admit Date:  3/18/2025    Abdominal pain   Hematuria     Emphysematous cystitis   CHRISTINA   HyperK    Subjective:  Mr. Ma today seen resting in bed. Still having some suprapubic discomfort but it is improved  Hematuria resolved     Objective:   Patient Vitals for the past 4 hrs:   BP Pulse Resp SpO2   03/20/25 0917 (!) 127/100 -- -- --   03/20/25 0716 (!) 82/40 74 17 94 %          Intake/Output Summary (Last 24 hours) at 3/20/2025 1007  Last data filed at 3/20/2025 1005  Gross per 24 hour   Intake 1812.67 ml   Output 1350 ml   Net 462.67 ml       Physical Exam:    Gen: No distress. Alert. +Chronically ill elderly male   Eyes: PERRL. No sclera icterus. No conjunctival injection.    Neck: No JVD.  Trachea midline.  Resp: No accessory muscle use. No crackles. No wheezes. No rhonchi.   CV: Regular rate. Regular rhythm. No murmur.  No rub. +trace bilateral lower extremity pitting edema.   Peripheral Pulses: +2 palpable, equal bilaterally   GI: Non-tender. Non-distended.  Normal bowel sounds.  Skin: Warm and dry. No nodule on exposed extremities. No rash on exposed extremities. +some erythema of groin   M/S: No cyanosis. No joint deformity. No clubbing.   Neuro: Awake. Grossly nonfocal    Psych: Oriented to person and place. No anxiety or agitation.         Medications:  meropenem, 1,000 mg, Q8H  sodium chloride flush, 5-40 mL, 2 times per day  insulin lispro, 0-4 Units, 4x Daily AC & HS  aspirin, 81 mg, Daily  atorvastatin, 10 mg, Daily  [Held by provider] losartan, 25 mg, Daily  pantoprazole, 20 mg, QAM AC  sodium zirconium cyclosilicate, 10 g, Daily  [Held by provider] enoxaparin, 30 mg, Daily      PRN Medications:  sodium chloride flush, 5-40 mL, PRN  sodium chloride, , PRN  ondansetron, 4 mg, Q8H PRN   Or  ondansetron, 4 mg, Q6H PRN  polyethylene glycol, 17 g, Daily PRN  acetaminophen, 650 mg, Q6H PRN   Or  acetaminophen, 650 mg, Q6H PRN  glucose, 4 tablet, PRN  dextrose bolus, 125 mL, PRN    Or  dextrose bolus, 250 mL, PRN  glucagon (rDNA), 1 mg, PRN  dextrose, , Continuous PRN  oxyCODONE, 2.5 mg, Q6H PRN   Or  oxyCODONE, 5 mg, Q6H PRN          Data:  CBC:   Recent Labs     03/18/25  1352 03/19/25  0508 03/19/25  2343 03/20/25  0815   WBC 10.7 11.9*  --  10.3   HGB 11.8* 11.7* 11.1* 11.2*   HCT 36.1* 36.9* 34.5* 34.0*   MCV 96.7 98.8  --  96.8   * 384  --  368     BMP:   Recent Labs     03/19/25  1530 03/19/25  2343 03/20/25  0815   * 137 137   K 5.5* 4.8 4.5    102 101   CO2 19* 23 27   BUN 42* 39* 36*   CREATININE 2.5* 2.4* 1.9*     LIVER PROFILE:   Recent Labs     03/18/25  1352   AST 14*   ALT 11   LIPASE 17.0   BILITOT 0.3   ALKPHOS 125     PT/INR:   Recent Labs     03/18/25  1352   PROTIME 14.2   INR 1.08       CULTURES  Results       Procedure Component Value Units Date/Time    Blood Culture 2 [[id number]] Collected: 03/19/25 0049    Order Status: Sent Specimen: Blood Updated: 03/19/25 0049    Blood Culture 1 [[id number]] Collected: 03/19/25 0049    Order Status: Sent Specimen: Blood Updated: 03/19/25 0049    COVID-19 & Influenza Combo [[id number]] Collected: 03/18/25 2203    Order Status: Completed Specimen: Nasopharyngeal Swab Updated: 03/18/25 2232     SARS-CoV-2 RNA, RT PCR NOT DETECTED     Comment: Not Detected results do not preclude SARS-CoV-2 infection and  should not be used as the sole basis for patient management  decisions.  Results must be combined with clinical observations,  patient history, and epidemiological information.  Testing was performed using IMELDA JESSICA SARS-CoV-2, Influenza A/B  and Respiratory Syncytial Virus nucleic acid assay. This  test is a multiplex Real-Time Reverse Transcriptase Polymerase Chain  Reaction (RT-PCR)-based in vitro diagnostic test intended for the  qualitative detection of nucleic acids from SARS-CoV-2,  influenza A,influenza B and Respiratory Syncytial Virus  in nasopharyngeal and nasal swab specimens.    Patient Fact Sheet:

## 2025-03-20 NOTE — ANESTHESIA PRE PROCEDURE
Department of Anesthesiology  Preprocedure Note       Name:  Oneil Ma   Age:  89 y.o.  :  1932                                          MRN:  [id number]         Date:  3/20/2025      Surgeon: Surgeon(s):  Jagjit Baltazar DO    Procedure: Procedure(s):  EGD W/ANES    Medications prior to admission:   Prior to Admission medications    Medication Sig Start Date End Date Taking? Authorizing Provider   diphenhydrAMINE-APAP, sleep, (TYLENOL PM EXTRA STRENGTH)  MG tablet Take 2 tablets by mouth nightly as needed for Sleep   Yes ProviderYesi MD   pantoprazole (PROTONIX) 20 MG tablet TAKE 1 TABLET BY MOUTH DAILY 25  Yes Melanie Perry MD   atorvastatin (LIPITOR) 10 MG tablet Take 1 tablet by mouth daily 18  Yes ProviderYesi MD   spironolactone (ALDACTONE) 25 MG tablet Take 0.5 tablets by mouth daily   Yes ProviderYesi MD   carvedilol (COREG) 3.125 MG tablet Take 1 tablet by mouth 2 times daily (with meals)   Yes Provider, MD Yesi   losartan (COZAAR) 25 MG tablet Take 1 tablet by mouth daily   Yes Provider, MD Yesi   aspirin 81 MG tablet Take 1 tablet by mouth daily   Yes ProviderYesi MD       Current medications:    Current Facility-Administered Medications   Medication Dose Route Frequency Provider Last Rate Last Admin   • meropenem (MERREM) 500 mg in sodium chloride 0.9 % 100 mL IVPB (addEASE)  500 mg IntraVENous Q12H Brian Smith MD 33.3 mL/hr at 25 1148 500 mg at 25 1148   • sodium chloride flush 0.9 % injection 5-40 mL  5-40 mL IntraVENous 2 times per day Prince Fuller MD   10 mL at 25 0906   • sodium chloride flush 0.9 % injection 5-40 mL  5-40 mL IntraVENous PRN Prince Fuller MD       • 0.9 % sodium chloride infusion   IntraVENous PRN Prince Fuller MD       • ondansetron (ZOFRAN-ODT) disintegrating tablet 4 mg  4 mg Oral Q8H PRN Prince Fuller MD        Or   • ondansetron (ZOFRAN) injection 4 mg

## 2025-03-20 NOTE — FLOWSHEET NOTE
03/20/25 0716   Vitals   Pulse 74   Heart Rate Source Monitor   Respirations 17   BP (!) 82/40   MAP (Calculated) 54   BP Location Right upper arm   BP Upper/Lower Upper   BP Method Automatic   Patient Position Lying right side   Oxygen Therapy   SpO2 94 %   O2 Device None (Room air)     Shift assessment completed-see flow sheet. Patient in bed awake,alert and oriented to self and place.  Vitals obtained- BP rechecked 127/100.  Morning medications held per NPO order-plan for EGD today.  Miller intact, clear yellow urine noted.  Patient repositioned to left side, no further needs expressed currently-call light within reach.

## 2025-03-20 NOTE — H&P
[facility] ENDOSCOPY     Procedure H&P    Patient: Oneil Ma MRN: [id number]     YOB: 1932  Age: 89 y.o.  Sex: male    Unit: [facility] ENDOSCOPY Room/Bed: ENDO/NONE Location: [facility]     Procedure: Procedure(s):  EGD W/ANES    Indication:Dysphgia  Referring  Physician:        Brief history: Bread sticks in throat    Nurses past medical history notes reviewed and agreed.   Medications reviewed.    Allergies: Lisinopril     Allergies noted: Yes     Past Medical History:   Past Medical History:   Diagnosis Date    Arthritis     Bradycardia     OA (osteoarthritis)     PE (pulmonary embolism) 1991    Slow heart rate 2011    had pm    Wears glasses        Past Surgical History:   Past Surgical History:   Procedure Laterality Date    CARDIAC SURGERY  09/20/2011    pacemaker, defib    CARPAL TUNNEL RELEASE  1/10/12    Right    CYST REMOVAL      OFF BACK    EYE SURGERY      bilateral cataracts    OTHER SURGICAL HISTORY Left 12/20/2016    excision of skin cancer of hand    OTHER SURGICAL HISTORY Left 08/15/2017    excision of lesions times 2, left ear    SKIN BIOPSY  5/17/2012    excision lesion behind the right ear       Social History:   Social History     Socioeconomic History    Marital status:      Spouse name: Not on file    Number of children: Not on file    Years of education: Not on file    Highest education level: Not on file   Occupational History    Not on file   Tobacco Use    Smoking status: Never    Smokeless tobacco: Never   Vaping Use    Vaping status: Never Used   Substance and Sexual Activity    Alcohol use: No    Drug use: No    Sexual activity: Not on file   Other Topics Concern    Not on file   Social History Narrative    Not on file     Social Drivers of Health     Financial Resource Strain: Low Risk  (3/11/2024)    Overall Financial Resource Strain (CARDIA)     Difficulty of Paying Living Expenses: Not hard at all   Food Insecurity: No Food Insecurity (3/19/2025)     03/19/2025 227 (H)  70 - 99 mg/dl Final    Performed on 03/19/2025 ACCU-CHEK   Final    Sodium 03/19/2025 133 (L)  136 - 145 mmol/L Final    Potassium reflex Magnesium 03/19/2025 5.5 (H)  3.5 - 5.1 mmol/L Final    Chloride 03/19/2025 101  99 - 110 mmol/L Final    CO2 03/19/2025 19 (L)  21 - 32 mmol/L Final    Anion Gap 03/19/2025 13  3 - 16 Final    Glucose 03/19/2025 292 (H)  70 - 99 mg/dL Final    BUN 03/19/2025 42 (H)  7 - 20 mg/dL Final    Creatinine 03/19/2025 2.5 (H)  0.8 - 1.3 mg/dL Final    Est, Glom Filt Rate 03/19/2025 23 (A)  >60 Final    Calcium 03/19/2025 8.1 (L)  8.3 - 10.6 mg/dL Final    Sodium 03/19/2025 137  136 - 145 mmol/L Final    Potassium reflex Magnesium 03/19/2025 4.8  3.5 - 5.1 mmol/L Final    Chloride 03/19/2025 102  99 - 110 mmol/L Final    CO2 03/19/2025 23  21 - 32 mmol/L Final    Anion Gap 03/19/2025 12  3 - 16 Final    Glucose 03/19/2025 164 (H)  70 - 99 mg/dL Final    BUN 03/19/2025 39 (H)  7 - 20 mg/dL Final    Creatinine 03/19/2025 2.4 (H)  0.8 - 1.3 mg/dL Final    Est, Glom Filt Rate 03/19/2025 25 (A)  >60 Final    Calcium 03/19/2025 8.1 (L)  8.3 - 10.6 mg/dL Final    Hemoglobin 03/19/2025 11.1 (L)  13.5 - 17.5 g/dL Final    Hematocrit 03/19/2025 34.5 (L)  40.5 - 52.5 % Final    POC Glucose 03/19/2025 322 (H)  70 - 99 mg/dl Final    Performed on 03/19/2025 ACCU-CHEK   Final    Sodium 03/20/2025 137  136 - 145 mmol/L Final    Potassium reflex Magnesium 03/20/2025 4.5  3.5 - 5.1 mmol/L Final    Chloride 03/20/2025 101  99 - 110 mmol/L Final    CO2 03/20/2025 27  21 - 32 mmol/L Final    Anion Gap 03/20/2025 9  3 - 16 Final    Glucose 03/20/2025 105 (H)  70 - 99 mg/dL Final    BUN 03/20/2025 36 (H)  7 - 20 mg/dL Final    Creatinine 03/20/2025 1.9 (H)  0.8 - 1.3 mg/dL Final    Est, Glom Filt Rate 03/20/2025 33 (A)  >60 Final    Calcium 03/20/2025 8.6  8.3 - 10.6 mg/dL Final    POC Glucose 03/19/2025 288 (H)  70 - 99 mg/dl Final    Performed on 03/19/2025 ACCU-CHEK   Final    WBC  03/20/2025 10.3  4.0 - 11.0 K/uL Final    RBC 03/20/2025 3.51 (L)  4.20 - 5.90 M/uL Final    Hemoglobin 03/20/2025 11.2 (L)  13.5 - 17.5 g/dL Final    Hematocrit 03/20/2025 34.0 (L)  40.5 - 52.5 % Final    MCV 03/20/2025 96.8  80.0 - 100.0 fL Final    MCH 03/20/2025 31.9  26.0 - 34.0 pg Final    MCHC 03/20/2025 33.0  31.0 - 36.0 g/dL Final    RDW 03/20/2025 14.3  12.4 - 15.4 % Final    Platelets 03/20/2025 368  135 - 450 K/uL Final    MPV 03/20/2025 7.6  5.0 - 10.5 fL Final    Neutrophils % 03/20/2025 64.8  % Final    Lymphocytes % 03/20/2025 17.7  % Final    Monocytes % 03/20/2025 13.7  % Final    Eosinophils % 03/20/2025 3.2  % Final    Basophils % 03/20/2025 0.6  % Final    Neutrophils Absolute 03/20/2025 6.7  1.7 - 7.7 K/uL Final    Lymphocytes Absolute 03/20/2025 1.8  1.0 - 5.1 K/uL Final    Monocytes Absolute 03/20/2025 1.4 (H)  0.0 - 1.3 K/uL Final    Eosinophils Absolute 03/20/2025 0.3  0.0 - 0.6 K/uL Final    Basophils Absolute 03/20/2025 0.1  0.0 - 0.2 K/uL Final    POC Glucose 03/20/2025 174 (H)  70 - 99 mg/dl Final    Performed on 03/20/2025 ACCU-CHEK   Final    POC Glucose 03/20/2025 115 (H)  70 - 99 mg/dl Final    Performed on 03/20/2025 ACCU-CHEK   Final    POC Glucose 03/20/2025 122 (H)  70 - 99 mg/dl Final    Performed on 03/20/2025 ACCU-CHEK   Final        Imaging:  FL MODIFIED BARIUM SWALLOW W VIDEO   Final Result   1. Positive silent tracheal aspiration of thin liquids when swallowing from a   straw.   2. Mild laryngeal penetration without tracheal aspiration when swallowing   thin liquids from a spoon and cup.   3. No evidence of laryngeal penetration or tracheal aspiration of nectar   thickened liquids, purees, or solids.   4. The combination of a mild cricopharyngeus bar and mild anterior esophageal   web at the C5-C6 level contribute to form a mild upper esophageal stenosis.   Please see separate speech pathology report for full discussion of findings   and recommendations.         CT ABDOMEN

## 2025-03-20 NOTE — PROGRESS NOTES
Progress Note    HISTORY     CC:  Cystitis           We are following for acute kidney injury        Subjective/   HPI:  He is doing better.  Scr is down to 1.9 and hematuria resolving.  Seems to be in less pain and slept better.    ROS:  Constitutional:  No fevers, No Chills, + weakness  Cardiovascular:  No palpations, mild edema  Respiratory:  No wheezing, no cough  Skin:  No rash, no itching  :  clearing hematuria, improved lower abdominal pain     Social Hx:  family at the bedside     Past Medical and Surgical History:  - Reviewed, no changes     EXAM       Objective/     Vitals:    03/20/25 0716 03/20/25 0917 03/20/25 1127 03/20/25 1400   BP: (!) 82/40 (!) 127/100 (!) 110/53 121/67   Pulse: 74  87 94   Resp: 17  18 18   Temp:   97.7 °F (36.5 °C) 96.9 °F (36.1 °C)   TempSrc:   Oral Temporal   SpO2: 94%  95% 94%   Weight:       Height:         24HR INTAKE/OUTPUT:    Intake/Output Summary (Last 24 hours) at 3/20/2025 1442  Last data filed at 3/20/2025 1005  Gross per 24 hour   Intake 1332.67 ml   Output 1350 ml   Net -17.33 ml     Constitutional:  NAD  Eyes:  Pupils reactive, sclera clear   Neck:  Normal thyroid, no masses   Cardiovascular:  Regular, no rub  Respiratory:  No distress, no wheezing  Psychiatry:  Appropriate mood/affect, alert  Abdomen: +bs, soft, nt, no masses   Musculoskeletal: trace LE edema, no clubbing   Lymphatics:  No LAD in neck, no supraclavicular nodes       MEDICAL DECISION MAKING       Data/  Recent Labs     03/18/25  1352 03/19/25  0508 03/19/25  2343 03/20/25  0815   WBC 10.7 11.9*  --  10.3   HGB 11.8* 11.7* 11.1* 11.2*   HCT 36.1* 36.9* 34.5* 34.0*   MCV 96.7 98.8  --  96.8   * 384  --  368     Recent Labs     03/19/25  1530 03/19/25  2343 03/20/25  0815   * 137 137   K 5.5* 4.8 4.5    102 101   CO2 19* 23 27   GLUCOSE 292* 164* 105*   BUN 42* 39* 36*   CREATININE 2.5* 2.4* 1.9*   LABGLOM 23* 25* 33*       Assessment/     Acute Kidney Injury:  KDIGO  stage 2  Etiology:  Concern for urinary retention vs. ATN due to cystitis  Improving with fluids and esparza / Scr now 2.6 -> 1.9   Hyperkalemia:  More common in [place] due to obstruction.  CT did not show hydronephrosis but did have a distended bladder.  Also on spironolactone and losartan at home  Has resolved with medical management   CHF:  EF = 30% / compensated with no edema.  On room air.  Not short of breath  Hematuria/Cystitis:  On IV antibiotics.  Urology consulted and esparza placed.  Cultures + for E. Coli and Proteus Mirabilis     Plan/     Reduce rate of IV fluids   Follow in/outs  IV antibiotics       Thank you for asking us to participate in the management of your patient, please do not hesitate to contact me for any concerns regarding my recommendations as outlined above.    -----------------------------  Zuleika Bonds M.D.   [facility]

## 2025-03-20 NOTE — PROGRESS NOTES
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]  Distended bladder [N32.89]  Acute renal insufficiency [N28.9]  Failure to thrive in adult [R62.7]  Acute cystitis with hematuria [N30.01]  Hyperglycemia due to diabetes mellitus (HCC) [E11.65]  Chronic heart failure with reduced ejection fraction (HFrEF, <= 40%) (HCC) [I50.22]  Admit Date:  3/18/2025  Precautions/Restrictions/WB Status/ Lines/ Wounds/ Oxygen: Fall risk, Bed/chair alarm, Lines (IV and internal catheter), Confusion, [place] (hard of hearing), and Telemetry      Treatment Time:  338- 6840  Treatment Number:  1   Timed Code Treatment Minutes: 23 minutes  Total Treatment Minutes:  33  minutes    Patient Stated Goals for Therapy: none stated          Discharge Recommendations:  SNF vs home with 24 hr assist and HHPT  DME needs for discharge: Needs Met       Therapy recommendation for EMS Transport: requires transport by cot due to pt needs lift equipment for safe transfers and pt needs A x 2 for safe transfers    Therapy recommendations for staff:   Assist of 2 for transfers with use of KARYNA STEDY to/from BSC  to/from chair    History of Present Illness: 89 y.o. male who presented to [facility] with pelvic pain and found to have emphysematous cystitis.  PMHx significant for ischemic cardiomyopathy, status post biventricular ICD, chronic HFrEF, history of complete heart block, wheelchair-bound, type 2 diabetes.       Patient reports that today, he started develop lower abdominal pain and spasms.  Also noted some blood in his urine as well as lower back pain.  He has chronic urinary incontinence and is wheelchair-bound at baseline.  Patient Nu[place] any fevers or chills.  Denies any nausea or vomiting.  He declined any symptoms of dysuria.  On my exam, he feels well but per daughter at bedside, she

## 2025-03-20 NOTE — PROGRESS NOTES
BP (!) 98/52   Pulse 87   Temp 97.3 °F (36.3 °C) (Oral)   Resp 17   Ht 1.676 m (5' 6\")   Wt 73.5 kg (162 lb 0.6 oz)   SpO2 95%   BMI 26.15 kg/m²     Patient is sleeping on bed but easy to wake up. Respiration easy, even and unlabored. Patient tolerated night meds well. Call light and bedside table within reach. Bed at lowest position, locked, side rails x2, bed alarm on. Pt denies needs at this time.

## 2025-03-20 NOTE — PLAN OF CARE
Problem: Discharge Planning  Goal: Discharge to home or other facility with appropriate resources  3/20/2025 0947 by Carolin Del Toro, RN  Outcome: Progressing  Flowsheets (Taken 3/20/2025 0947)  Discharge to home or other facility with appropriate resources: Identify barriers to discharge with patient and caregiver     Problem: Chronic Conditions and Co-morbidities  Goal: Patient's chronic conditions and co-morbidity symptoms are monitored and maintained or improved  3/20/2025 0947 by Carolin Del Toro, RN  Outcome: Progressing     Problem: ABCDS Injury Assessment  Goal: Absence of physical injury  3/20/2025 0947 by Carolin Del Toro, RN  Outcome: Progressing     Problem: Skin/Tissue Integrity  Goal: Skin integrity remains intact  Description: 1.  Monitor for areas of redness and/or skin breakdown  2.  Assess vascular access sites hourly  3.  Every 4-6 hours minimum:  Change oxygen saturation probe site  4.  Every 4-6 hours:  If on nasal continuous positive airway pressure, respiratory therapy assess nares and determine need for appliance change or resting period  3/20/2025 0947 by Carolin Del Toro, RN  Outcome: Progressing

## 2025-03-20 NOTE — ANESTHESIA POSTPROCEDURE EVALUATION
Department of Anesthesiology  Postprocedure Note    Patient: Oneil Ma  MRN: [id number]  YOB: 1932  Date of evaluation: 3/20/2025    Procedure Summary       Date: 03/20/25 Room / Location: [address]    Anesthesia Start: 1455 Anesthesia Stop: 1522    Procedure: EGD W/ANES Diagnosis:       Dysphagia, unspecified type      (Dysphagia, unspecified type [R13.10])    Surgeons: Jagjit Baltazar DO Responsible Provider: Oc Argueta MD    Anesthesia Type: MAC ASA Status: 4            Anesthesia Type: No value filed.    Parisa Phase I:      Parisa Phase II: Parisa Score: 10    Anesthesia Post Evaluation    Patient location during evaluation: PACU  Patient participation: complete - patient participated  Level of consciousness: awake and alert  Airway patency: patent  Nausea & Vomiting: no nausea and no vomiting  Cardiovascular status: blood pressure returned to baseline  Respiratory status: acceptable  Hydration status: euvolemic  Comments: VSS on transfer to phase 2 recovery.  No anesthetic complications.  Pain management: adequate    No notable events documented.

## 2025-03-20 NOTE — FLOWSHEET NOTE
03/19/25 2119   Vital Signs   Temp 97.5 °F (36.4 °C)   Temp Source Axillary   Pulse 73   Heart Rate Source Monitor   Respirations 18   /63   MAP (Calculated) 79   BP Location Right upper arm   BP Method Automatic   Patient Position Semi fowlers   Opioid-Induced Sedation   POSS Score S   RASS   [place]orton Agitation Sedation Scale (RASS) -1   Oxygen Therapy   SpO2 100 %   O2 Device None (Room air)       Patient is sleeping but easy to wake up when name called. With telemetry. With esparza catheter connected to urine bag draining. Assessment completed. Respiration easy, even and unlabored. Night medication given. Call light and bedside table within reach. Bed at lowest position, locked, side rails x2, bed alarm on. Pt denies needs at this time.

## 2025-03-20 NOTE — DISCHARGE INSTR - COC
Continuity of Care Form    Patient Name: Oneil Ma   :  1932  MRN:  [id number]    Admit date:  3/18/2025  Discharge date:  3/21/2025      Code Status Order: Limited   Advance Directives:     Admitting Physician:  Prince Fuller MD  PCP: Shaun Mendoza MD    Discharging Nurse: NELLA Walter RN  Discharging Hospital Unit/Room#: /0322-01  Discharging Unit Phone Number: [phone]    Emergency Contact:   Extended Emergency Contact Information  Primary Emergency Contact: Rolanon,Gayle  Address: [address] [address] [facility]  Home Phone: [phone]  Work Phone: [phone]  Mobile Phone: [phone]  Relation: Child  Secondary Emergency Contact: Miguel Ma  Address: [address] [address] [facility]  Home Phone: [phone]  Mobile Phone: [phone]  Relation: Child    Past Surgical History:  Past Surgical History:   Procedure Laterality Date    CARDIAC SURGERY  2011    pacemaker, defib    CARPAL TUNNEL RELEASE  1/10/12    Right    CYST REMOVAL      OFF BACK    EYE SURGERY      bilateral cataracts    OTHER SURGICAL HISTORY Left 2016    excision of skin cancer of hand    OTHER SURGICAL HISTORY Left 08/15/2017    excision of lesions times 2, left ear    SKIN BIOPSY  2012    excision lesion behind the right ear       Immunization History:   Immunization History   Administered Date(s) Administered    COVID-19, MODERNA BLUE border, Primary or Immunocompromised, (age 12y+), IM, 100 mcg/0.5mL 2021, 2021, 2021, 2022    COVID-19, MODERNA, , (age 12y+), IM, 50mcg/0.5mL 10/11/2023, 2024    Influenza Virus Vaccine 2009, 10/18/2010, 10/26/2015, 10/06/2018    Influenza, FLUAD, (age 65 y+), IM, Quadv, 0.5mL 10/07/2021, 10/05/2022    Influenza, FLUAD, (age 65 y+), IM, Trivalent PF, 0.5mL 10/10/2017, 10/06/2018, 10/11/2019    Influenza, FLUCELVAX, (age 6 mo+), MDCK,  oriented and alert    IV Access:  - Midline     Nursing Mobility/ADLs:  Walking   Assisted  Transfer  Assisted  Bathing  Assisted  Dressing  Assisted  Toileting  Assisted  Feeding  Assisted  Med Admin  Assisted  Med Delivery   whole    Wound Care Documentation and Therapy:  Wound 03/20/25 Coccyx Mid (Active)   Wound Image   03/20/25 0917   Wound Etiology Pressure Stage 2 03/20/25 0917   Dressing Status Clean;Dry;Intact 03/20/25 0917   Dressing/Treatment Foam 03/20/25 0917   Wound Length (cm) 6 cm 03/20/25 0917   Wound Width (cm) 2.5 cm 03/20/25 0917   Wound Surface Area (cm^2) 15 cm^2 03/20/25 0917   Wound Assessment Pink/red 03/20/25 0917   Drainage Amount Scant (moist but unmeasurable) 03/20/25 0917   Drainage Description Serosanguinous 03/20/25 0917   Number of days: 0        Elimination:  Continence:   Bowel: No  Bladder: Miller  Urinary Catheter: Insertion Date: 3/19/2025    Colostomy/Ileostomy/Ileal Conduit: No       Date of Last BM: 3/16/2025    Intake/Output Summary (Last 24 hours) at 3/20/2025 1128  Last data filed at 3/20/2025 1005  Gross per 24 hour   Intake 1572.67 ml   Output 1350 ml   Net 222.67 ml     I/O last 3 completed shifts:  In: 1812.7 [P.O.:720; I.V.:1092.7]  Out: 1400 [Urine:1400]    Safety Concerns:     At Risk for Falls    Impairments/Disabilities:      Vision and Hearing    Nutrition Therapy:  Current Nutrition Therapy:   - Oral Diet:  Dysphagia - Soft and Bite Sized    Routes of Feeding: Oral  Liquids: Thin Liquids  Daily Fluid Restriction: no  Last Modified Barium Swallow with Video (Video Swallowing Test): not done    Treatments at the Time of Hospital Discharge:   Respiratory Treatments:   Oxygen Therapy:  is not on home oxygen therapy.  Ventilator:    - No ventilator support    Rehab Therapies: Physical Therapy and Occupational Therapy  Weight Bearing Status/Restrictions: No weight bearing restrictions  Other Medical Equipment (for information only, NOT a DME order):  wheelchair and  steady  Other Treatments:     Patient's personal belongings (please select all that are sent with patient):  Glasses    RN SIGNATURE:  Electronically signed by La Nena Walter RN on 3/21/25 at 2:40 PM EDT    CASE MANAGEMENT/SOCIAL WORK SECTION    Inpatient Status Date: 3/18    Readmission Risk Assessment Score:  [facility] RISK OF UNPLANNED READMISSION 2.0             16 Total Score        Discharging to Facility/ Agency   Name: [facility]  Address:  Phone: [phone]   Fax:[phone]     Dialysis Facility (if applicable)   Name:  Address:  Dialysis Schedule:  Phone:  Fax:    / signature: Electronically signed by Lexi Armstrong RN on 3/21/25 at 1:58 PM EDT    PHYSICIAN SECTION    Prognosis: Fair    Condition at Discharge: Stable    Rehab Potential (if transferring to Rehab): Fair    Recommended Labs or Other Treatments After Discharge: meropenem 1 g q 8- 5 days   CBC,BMP every 3 days while on meropenem  Dc midline after abx     Physician Certification: I certify the above information and transfer of Oneil Ma  is necessary for the continuing treatment of the diagnosis listed and that he requires Skilled Nursing Facility for less 30 days.     Update Admission H&P: No change in H&P    PHYSICIAN SIGNATURE:  Electronically signed by ELIZABETH ROSA MD on 3/21/25 at 1:50 PM EDT

## 2025-03-20 NOTE — PROGRESS NOTES
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 Treatment unable to be completed due to pt NPO for EGD. RN uncertain of time. SLP to re-attempt as pt's condition and schedule allows. RN aware. No charges.    Thank you,    Parvin Ku M.A. CCC-SLP   Speech-Language Pathologist      Abbe Flap (Upper To Lower Lip) Text: The defect of the lower lip was assessed and measured.  Given the location and size of the defect, an Abbe flap was deemed most appropriate.  Using a sterile surgical marker, an appropriate Abbe flap was measured and drawn on the upper lip. Local anesthesia was then infiltrated.  A scalpel was then used to incise the upper lip through and through the skin, vermilion, muscle and mucosa, leaving the flap pedicled on the opposite side.  The flap was then rotated and transferred to the lower lip defect.  The flap was then sutured into place with a three layer technique, closing the orbicularis oris muscle layer with subcutaneous buried sutures, followed by a mucosal layer and an epidermal layer.

## 2025-03-20 NOTE — PLAN OF CARE
Problem: Chronic Conditions and Co-morbidities  Goal: Patient's chronic conditions and co-morbidity symptoms are monitored and maintained or improved  Outcome: Progressing     Problem: Discharge Planning  Goal: Discharge to home or other facility with appropriate resources  Outcome: Progressing     Problem: Safety - Adult  Goal: Free from fall injury  Outcome: Progressing     Problem: ABCDS Injury Assessment  Goal: Absence of physical injury  Outcome: Progressing     Problem: Skin/Tissue Integrity  Goal: Skin integrity remains intact  Outcome: Progressing

## 2025-03-20 NOTE — PROGRESS NOTES
CMU called for 6 beats of Idioventricular rhythm, patient asymptomatic. Informed Dr. Mosquera per adia. Continue monitoring.

## 2025-03-20 NOTE — PROGRESS NOTES
Transferred care to ISMAEL Coe. Face to face bedside report given, no need voiced at this time. Pt in bed with eyes closed.   No signs of distress noted.  Call light within reach.   Denies needs.

## 2025-03-21 ENCOUNTER — APPOINTMENT (OUTPATIENT)
Dept: GENERAL RADIOLOGY | Age: 89
DRG: 683 | End: 2025-03-21
Payer: MEDICARE

## 2025-03-21 VITALS
WEIGHT: 162.04 LBS | TEMPERATURE: 97.4 F | HEIGHT: 66 IN | RESPIRATION RATE: 16 BRPM | SYSTOLIC BLOOD PRESSURE: 123 MMHG | BODY MASS INDEX: 26.04 KG/M2 | HEART RATE: 88 BPM | DIASTOLIC BLOOD PRESSURE: 77 MMHG | OXYGEN SATURATION: 93 %

## 2025-03-21 LAB
BACTERIA UR CULT: ABNORMAL
BACTERIA UR CULT: ABNORMAL
GLUCOSE BLD-MCNC: 125 MG/DL (ref 70–99)
GLUCOSE BLD-MCNC: 178 MG/DL (ref 70–99)
ORGANISM: ABNORMAL
ORGANISM: ABNORMAL
PERFORMED ON: ABNORMAL
PERFORMED ON: ABNORMAL

## 2025-03-21 PROCEDURE — 6370000000 HC RX 637 (ALT 250 FOR IP): Performed by: STUDENT IN AN ORGANIZED HEALTH CARE EDUCATION/TRAINING PROGRAM

## 2025-03-21 PROCEDURE — 6370000000 HC RX 637 (ALT 250 FOR IP): Performed by: PHYSICIAN ASSISTANT

## 2025-03-21 PROCEDURE — 76937 US GUIDE VASCULAR ACCESS: CPT

## 2025-03-21 PROCEDURE — 36410 VNPNXR 3YR/> PHY/QHP DX/THER: CPT

## 2025-03-21 PROCEDURE — 99239 HOSP IP/OBS DSCHRG MGMT >30: CPT | Performed by: INTERNAL MEDICINE

## 2025-03-21 PROCEDURE — 74220 X-RAY XM ESOPHAGUS 1CNTRST: CPT

## 2025-03-21 PROCEDURE — 05HA33Z INSERTION OF INFUSION DEVICE INTO LEFT BRACHIAL VEIN, PERCUTANEOUS APPROACH: ICD-10-PCS | Performed by: STUDENT IN AN ORGANIZED HEALTH CARE EDUCATION/TRAINING PROGRAM

## 2025-03-21 PROCEDURE — 2580000003 HC RX 258: Performed by: INTERNAL MEDICINE

## 2025-03-21 PROCEDURE — 6360000002 HC RX W HCPCS: Performed by: INTERNAL MEDICINE

## 2025-03-21 RX ORDER — SODIUM CHLORIDE 0.9 % (FLUSH) 0.9 %
5-40 SYRINGE (ML) INJECTION EVERY 12 HOURS SCHEDULED
Status: DISCONTINUED | OUTPATIENT
Start: 2025-03-21 | End: 2025-03-21 | Stop reason: SDUPTHER

## 2025-03-21 RX ORDER — SODIUM CHLORIDE 0.9 % (FLUSH) 0.9 %
5-40 SYRINGE (ML) INJECTION PRN
Status: DISCONTINUED | OUTPATIENT
Start: 2025-03-21 | End: 2025-03-21 | Stop reason: SDUPTHER

## 2025-03-21 RX ORDER — SODIUM CHLORIDE 9 MG/ML
INJECTION, SOLUTION INTRAVENOUS PRN
Status: DISCONTINUED | OUTPATIENT
Start: 2025-03-21 | End: 2025-03-21 | Stop reason: SDUPTHER

## 2025-03-21 RX ORDER — LIDOCAINE HYDROCHLORIDE 10 MG/ML
50 INJECTION, SOLUTION EPIDURAL; INFILTRATION; INTRACAUDAL; PERINEURAL ONCE
Status: DISCONTINUED | OUTPATIENT
Start: 2025-03-21 | End: 2025-03-21 | Stop reason: HOSPADM

## 2025-03-21 RX ADMIN — ASPIRIN 81 MG: 81 TABLET, COATED ORAL at 10:28

## 2025-03-21 RX ADMIN — ATORVASTATIN CALCIUM 10 MG: 10 TABLET, FILM COATED ORAL at 10:28

## 2025-03-21 RX ADMIN — MEROPENEM 500 MG: 500 INJECTION INTRAVENOUS at 10:28

## 2025-03-21 RX ADMIN — PANTOPRAZOLE SODIUM 20 MG: 20 TABLET, DELAYED RELEASE ORAL at 10:28

## 2025-03-21 RX ADMIN — SODIUM ZIRCONIUM CYCLOSILICATE 10 G: 10 POWDER, FOR SUSPENSION ORAL at 10:28

## 2025-03-21 NOTE — FLOWSHEET NOTE
03/20/25 2000   Vital Signs   Temp 97.8 °F (36.6 °C)   Temp Source Oral   Pulse 91   Heart Rate Source Monitor   Respirations 16   /70   MAP (Calculated) 89   BP Location Left upper arm   BP Method Automatic   Patient Position Semi fowlers   Oxygen Therapy   SpO2 95 %   O2 Device None (Room air)     Pt assessment complete.  Pt lying quietly in bed.  Lung sounds clear.  Pt on room air.  Pt Vpaced per monitor with HR of 91.  IV fluids infusing at 50ml/hr.  Miller cath in place draining clear yellow urine.   2 units of insulin given.  No PO meds due at this time.  Pt repositioned for comfort.  Denies needs.  Call light within reach.  Bed exit alarm on.

## 2025-03-21 NOTE — PROGRESS NOTES
Patient educated on discharge instructions as well as new medications use, dosage, administration and possible side effects.  Patient verified knowledge. IV removed without difficulty and dry dressing in place. MidLine is in place. Telemetry monitor removed and returned to [facility]. Pt left facility in stable condition to Skilled nursing facility with all of their personal belongings.

## 2025-03-21 NOTE — PROGRESS NOTES
Progress Note    Patient Oneil Ma  MRN: [id number]  YOB: 1932 Age: 89 y.o. Sex: male  Room: [place] [address]       Admitting Physician: Prince Fuller MD   Date of Admission: 3/18/2025 12:21 PM   Primary Care Physician: Shaun Mendoza MD     Subjective:  Oneil Ma was seen and examined. We are following for dysphagia.  -- Upper endoscopy shows a Zenker's diverticulum.  Esophagram done today shows the same.    ROS:  Constitutional: Denies fever, no change in appetite  Respiratory: Denies cough or shortness of breath  Cardiovascular: Denies chest pain or edema    Objective:  Vital Signs:   Vitals:    03/21/25 1104   BP: 123/77   Pulse: 88   Resp: 16   Temp: 97.4 °F (36.3 °C)   SpO2: 93%         Physical Exam:  Constitutional: Alert and oriented x 4. No acute distress.   HEENT: Sclera anicteric, mucosal membranes moist  Cardiovascular: Regular rate and rhythm.  No murmurs.  Respiratory: Respirations nonlabored, no crepitus  GI: Abdomen nondistended, soft, and nontender.  Normal active bowel sounds.  No masses palpable.   Rectal: Deferred  Musculoskeletal:  No pitting edema of the lower legs.  Neurological: Gross memory appears intact.  No asterixis    Intake/Output:    Intake/Output Summary (Last 24 hours) at 3/21/2025 1658  Last data filed at 3/21/2025 0930  Gross per 24 hour   Intake 220 ml   Output 1250 ml   Net -1030 ml        Current Medications:  Current Facility-Administered Medications   Medication Dose Route Frequency Provider Last Rate Last Admin    lidocaine PF 1 % injection 50 mg  50 mg IntraDERmal Once Brian Smith MD        meropenem (MERREM) 500 mg in sodium chloride 0.9 % 100 mL IVPB (addEASE)  500 mg IntraVENous Q12H Brian Smith MD   Stopped at 03/21/25 1347    sodium chloride flush 0.9 % injection 5-40 mL  5-40 mL IntraVENous 2 times per day Prince Fuller MD   10 mL at 03/20/25 2019    sodium chloride flush 0.9 % injection 5-40 mL  137 137 135* 135*   K  --    < > 5.5* 4.8 4.5 4.5 4.7    < > = values in this interval not displayed.          Assessment:  Hospital Problems           Last Modified POA    * (Principal) Emphysematous cystitis 3/18/2025 Yes    Acute kidney injury superimposed on CKD 3/19/2025 Yes    Acute cystitis with hematuria 3/19/2025 Yes    Chronic heart failure with reduced ejection fraction (HFrEF, <= 40%) ([facility]) 3/19/2025 Yes       This 89-year-old gentleman with dysphagia in particular having problems with bread sticking in his throat.    Plan:  If the dysphagia continues to be a problem for him would recommend evaluation by ENT.  For possible treatment of the diverticulum.      Jagjit Baltazar, DO    [facility]    [phone]. Also available via [employer] Serve    Please note that some or all of this record was generated using voice recognition software. If there are any questions about the content of this document, please contact the author as some errors in translation may have occurred.

## 2025-03-21 NOTE — DISCHARGE SUMMARY
Name:  Oneil Ma  Room:  /0315-01  MRN:    [id number]    Discharge Summary      This discharge summary is in conjunction with a complete physical exam done on the day of discharge.      Discharging Physician: Dr. Smith      Admit: 3/18/2025  Discharge:  3/21/2025    Diagnoses this Admission    Principal Problem:    Emphysematous cystitis  Active Problems:    Acute kidney injury superimposed on CKD    Acute cystitis with hematuria    Chronic heart failure with reduced ejection fraction (HFrEF, <= 40%) ([facility])  Resolved Problems:    * No resolved hospital problems. *          Procedures (Please Review Full Report for Details)  Insertion of esparza catheter    Consults    IP CONSULT TO NEPHROLOGY  IP CONSULT TO HOSPITALIST  IP CONSULT TO UROLOGY  IP CONSULT TO GI  IP CONSULT TO GI  IP CONSULT TO VASCULAR ACCESS TEAM  IP CONSULT TO VASCULAR ACCESS TEAM      HPI:    89 y.o. male who presented to [facility] with pelvic pain and found to have emphysematous cystitis.  PMHx significant for ischemic cardiomyopathy, status post biventricular ICD, chronic HFrEF, history of complete heart block, wheelchair-bound, type 2 diabetes.       Patient reports that today, he started develop lower abdominal pain and spasms.  Also noted some blood in his urine as well as lower back pain.  He has chronic urinary incontinence and is wheelchair-bound at baseline.  Patient Nuys any fevers or chills.  Denies any nausea or vomiting.  He declined any symptoms of dysuria.  On my exam, he feels well but per daughter at bedside, she notes that he really does not complain much of pain but you can tell he becomes uncomfortable at times.  He was comfortable my exam.  No other acute complaints.     In the ED, vital signs stable.  Sugars were elevated.  BMP revealed sodium 132, potassium 6.1, BUN 49 with creatinine of 2.6 and glucose of 311.  Troponins were flat.  proBNP elevated at 1204.  CBC showed hemoglobin 11.8, platelet count 458,  multiplex Real-Time Reverse Transcriptase Polymerase Chain  Reaction (RT-PCR)-based in vitro diagnostic test intended for the  qualitative detection of nucleic acids from SARS-CoV-2,  influenza A,influenza B and Respiratory Syncytial Virus  in nasopharyngeal and nasal swab specimens.    Patient Fact Sheet:  https://www.fda.gov/media/539187/download  Provider Fact Sheet: https://www.fda.gov/media/751035/download  EUA: https://www.fda.gov/media/428534/download  IFU: https://www.fda.gov/media/349519/download    Methodology:  RT-PCR          Influenza A NOT DETECTED     Influenza B NOT DETECTED    Culture, Urine [[id number]]  (Abnormal)  (Susceptibility) Collected: 03/18/25 1343    Order Status: Completed Specimen: Urine, clean catch Updated: 03/21/25 0732     Organism Escherichia coli ESBL     Urine Culture, Routine --     >100,000 CFU/ml  CONTACT PRECAUTIONS INDICATED  Carbapenem antibiotics are the best option for infections caused  by ESBL producing organisms.  Other antibiotic classes are  likely to result in treatment failure, even for organisms  demonstrating in vitro susceptibility.       Organism Proteus mirabilis     Urine Culture, Routine 50,000 CFU/ml    Narrative:      ORDER#: [id number]                          ORDERED BY: NOLAN ROCHA  SOURCE: Urine Clean Catch                  COLLECTED:  03/18/25 13:43  ANTIBIOTICS AT CONNIE.:                      RECEIVED :  03/18/25 15:33  CALL  [place]  [facility] tel. [phone],  Microbiology results called to and read back by ISMAEL Del Toro,  03/20/2025 07:59, by LAZARO    Susceptibility        Escherichia coli ESBL      BACTERIAL SUSCEPTIBILITY PANEL BY UMAIR      ampicillin >=32 mcg/mL Resistant      ampicillin-sulbactam >=32 mcg/mL Resistant      ceFAZolin >=64 mcg/mL Resistant      cefepime  Resistant      cefOXitin <=4 mcg/mL Sensitive      cefTRIAXone >=64 mcg/mL Resistant      cefuroxime >=64 mcg/mL Resistant  [1]       ciprofloxacin >=4 mcg/mL Resistant

## 2025-03-21 NOTE — PROGRESS NOTES
Progress Note    Admit Date:  3/18/2025    Abdominal pain   Hematuria     Emphysematous cystitis   CHRISTINA   HyperK    Subjective:  Mr. Ma today seen resting in bed.   C/o low back pain  Hematuria resolved     Objective:   Patient Vitals for the past 4 hrs:   BP Temp Temp src Pulse Resp SpO2   03/21/25 0654 112/69 97.5 °F (36.4 °C) Oral 88 16 93 %          Intake/Output Summary (Last 24 hours) at 3/21/2025 0921  Last data filed at 3/21/2025 0425  Gross per 24 hour   Intake 220 ml   Output 1150 ml   Net -930 ml       Physical Exam:    Gen: No distress. Alert. +Chronically ill elderly male   Eyes: PERRL. No sclera icterus. No conjunctival injection.    Neck: No JVD.  Trachea midline.  Resp: No accessory muscle use. No crackles. No wheezes. No rhonchi.   CV: Regular rate. Regular rhythm. No murmur.  No rub. +trace bilateral lower extremity pitting edema.   Peripheral Pulses: +2 palpable, equal bilaterally   GI: Non-tender. Non-distended.  Normal bowel sounds.  Skin: Warm and dry. No nodule on exposed extremities. No rash on exposed extremities. +some erythema of groin   M/S: No cyanosis. No joint deformity. No clubbing.   Neuro: Awake. Grossly nonfocal    Psych: Oriented to person and place. No anxiety or agitation.         Medications:  meropenem, 500 mg, Q12H  sodium chloride flush, 5-40 mL, 2 times per day  insulin lispro, 0-4 Units, 4x Daily AC & HS  aspirin, 81 mg, Daily  atorvastatin, 10 mg, Daily  [Held by provider] losartan, 25 mg, Daily  pantoprazole, 20 mg, QAM AC  sodium zirconium cyclosilicate, 10 g, Daily  [Held by provider] enoxaparin, 30 mg, Daily      PRN Medications:  sodium chloride flush, 5-40 mL, PRN  sodium chloride, , PRN  ondansetron, 4 mg, Q8H PRN   Or  ondansetron, 4 mg, Q6H PRN  polyethylene glycol, 17 g, Daily PRN  acetaminophen, 650 mg, Q6H PRN   Or  acetaminophen, 650 mg, Q6H PRN  glucose, 4 tablet, PRN  dextrose bolus, 125 mL, PRN   Or  dextrose bolus, 250 mL, PRN  glucagon (rDNA), 1 mg,

## 2025-03-21 NOTE — CARE COORDINATION
DISCHARGE ORDER  Date/Time 3/21/2025 1:55 PM  Completed by: Lexi Armstrong RN, Case Management    Patient Name: Oneil Ma    : 1932      Admit order Date and Status:ip 3/18  Noted discharge order. (verify MD's last order for status of admission/Traditional Medicare 3 [place] Inpatient qualifying stay required for SNF)    Confirmed discharge plan with:              Patient:  Yes              When pt confirms DC plan does any support person need to be contacted by CM Yes if yes who___debra___                      Discharge to Facility: [facility]   Facility phone number for staff giving report: [phone]    Pre-certification completed: yes   Hospital Exemption Notification (HENS) completed: yes   Discharge orders and Continuity of Care faxed to facility:  [facility]      Transportation:               Medical Transport explained with choice list offered to pt/family.                Choice:(no preference)  Agency used: [facility]   time:   1600      Pt/family/Nursing/Facility aware of  time:   Yes Names: angel lassiter debra, ferdinand oneil  Ambulance form completed:  yes:      Date Last IMM Given: 3/21    Comments:pt to DC to [facility]. Transport arranged. Family and pt notified. Facility notified. Pt is on RA at this time    Pt is being d/c'd to [facility] today. Pt's O2 sats are 93% on ra.    Discharge timeout done with rn, cm, pt/family. All discharge needs and concerns addressed.    Discharging nurse to complete NOREEN, reconcile AVS, and place final copy with patient's discharge packet. Discharging RN to ensure that written prescriptions for  Level II medications are sent with patient to the facility as per protocol.

## 2025-03-21 NOTE — FLOWSHEET NOTE
03/20/25 2339   Vital Signs   Temp 98.7 °F (37.1 °C)   Temp Source Axillary   Pulse 79   Heart Rate Source Monitor   Respirations 16   BP (!) 92/49   MAP (Calculated) 63   BP Location Right upper arm   BP Method Automatic   Patient Position Semi fowlers   Oxygen Therapy   SpO2 91 %   O2 Device None (Room air)     Reassessment complete.  No changes noted.  Pt is lying in bed with their eyes closed. Respirations are easy and even. Call light within reach bed in lowest position with the wheels locked. Will continue to monitor. Gilda Servin RN

## 2025-03-21 NOTE — PROGRESS NOTES
Progress Note    HISTORY     CC:  Cystitis           We are following for acute kidney injury        Subjective/   HPI:  He is doing better.  Scr is down to 1.8.  He had breakfast.  He is feeling ok.    ROS:  Constitutional:  No fevers, No Chills, + weakness  Cardiovascular:  No palpations, mild edema  Respiratory:  No wheezing, no cough  Skin:  No rash, no itching  :  clearing hematuria, improved lower abdominal pain     Social Hx:  No family at the bedside     Past Medical and Surgical History:  - Reviewed, no changes     EXAM       Objective/     Vitals:    03/20/25 2339 03/21/25 0415 03/21/25 0654 03/21/25 1104   BP: (!) 92/49 101/68 112/69 123/77   Pulse: 79 88 88 88   Resp: 16 16 16 16   Temp: 98.7 °F (37.1 °C) 97.9 °F (36.6 °C) 97.5 °F (36.4 °C) 97.4 °F (36.3 °C)   TempSrc: Axillary Oral Oral Oral   SpO2: 91% 94% 93% 93%   Weight:       Height:         24HR INTAKE/OUTPUT:    Intake/Output Summary (Last 24 hours) at 3/21/2025 1233  Last data filed at 3/21/2025 0930  Gross per 24 hour   Intake 220 ml   Output 1250 ml   Net -1030 ml     Constitutional:  NAD  Eyes:  Pupils reactive, sclera clear   Neck:  Normal thyroid, no masses   Cardiovascular:  Regular, no rub  Respiratory:  No distress, no wheezing  Psychiatry:  Appropriate mood/affect, alert  Abdomen: +bs, soft, nt, no masses   Musculoskeletal: trace LE edema, no clubbing   Lymphatics:  No LAD in neck, no supraclavicular nodes       MEDICAL DECISION MAKING       Data/  Recent Labs     03/18/25  1352 03/19/25  0508 03/19/25  2343 03/20/25  0815 03/20/25  1648 03/20/25  2321   WBC 10.7 11.9*  --  10.3  --   --    HGB 11.8* 11.7*   < > 11.2* 10.7* 9.9*   HCT 36.1* 36.9*   < > 34.0* 33.1* 29.3*   MCV 96.7 98.8  --  96.8  --   --    * 384  --  368  --   --     < > = values in this interval not displayed.     Recent Labs     03/20/25  0815 03/20/25  1648 03/20/25  2321    135* 135*   K 4.5 4.5 4.7    98* 100   CO2 27 26 27

## 2025-03-21 NOTE — PROGRESS NOTES
Midline Insertion Procedure Note  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 Brian Smith  Indication for Insertion: Home Antibiotics    Please change all IV tubing prior to use. Arrow Midlines can stay in place up to 28 days. Flush each lumen per protocol to maximize performance of line. Midline CAN be used for blood sampling, change needleless connector after sampling and use pulsatile flush to clear blood in catheter.     Lab Results   Component Value Date    INR 1.08 03/18/2025    PROTIME 14.2 03/18/2025     Lab Results   Component Value Date    WBC 10.3 03/20/2025    HGB 9.9 (L) 03/20/2025    HCT 29.3 (L) 03/20/2025     03/20/2025     Lab Results   Component Value Date    CREATININE 1.8 (H) 03/20/2025    BUN 32 (H) 03/20/2025           Catheter Insertion Date- 3/21/2025   Catheter Brand- Arrow Antimicrobial/Antithrombogenic   Lot Number- [id number]  Lumen-Single    Insertion Site- Left Brachial  Vein Diameter- 5 mm  [place]ian Size- 4.5  Catheter Length- 15 cm  Exposed Catheter Length- 0cm   Easy insertion- Yes  Able to Aspirate Blood- Yes  Easy Flush- Yes    Midline insertion successful- Yes  Ultrasound- yes    Okay To Use Midline- No, X-Ray Dictation Clearance Required    Electronically signed by Ofelia Winter, RN, RN on 3/21/2025 at 3:14 PM

## 2025-03-23 LAB
BACTERIA BLD CULT ORG #2: NORMAL
BACTERIA BLD CULT: NORMAL

## 2025-03-25 ENCOUNTER — TELEPHONE (OUTPATIENT)
Dept: FAMILY MEDICINE CLINIC | Age: 89
End: 2025-03-25

## 2025-03-25 NOTE — TELEPHONE ENCOUNTER
[facility] Transitions Initial Follow Up Call  Outreach made within 2 business days of discharge: Yes  Patient: Oneil Ma Patient : 1932   MRN: [id number]  Reason for Admission: There are no discharge diagnoses documented for the most recent discharge.  Discharge Date: 3/21/25    Spoke with: Ari Rivera  Discharge department/facility: [facility]  Non-face-to-face services provided:  Obtained and reviewed discharge summary and/or continuity of care documents  TCM Interactive Patient Contact:  Was patient able to fill all prescriptions: Yes  Was patient instructed to bring all medications to the follow-up visit: Yes  Is patient taking all medications as directed in the discharge summary? Yes  Does patient understand their discharge instructions: Yes  Does patient have questions or concerns that need addressed prior to 7-14 day follow up office visit: no    Scheduled appointment with PCP within 7-14 days  Follow Up  Will follow up after released from SNF  Discharge to Facility: [facility] [place]   phone number  [phone]     ALLY ORTEGA RN

## 2025-03-28 NOTE — PROGRESS NOTES
Physician Progress Note      PATIENT:               NADEEM BENNETT  CSN #:                  [id number]  :                       1932  ADMIT DATE:       3/18/2025 12:21 PM  DISCH DATE:        3/21/2025 3:38 PM  RESPONDING  PROVIDER #:        Brian Smith MD          QUERY TEXT:    Pt admitted with Cystitis and CHRISTINA. Pt noted to have a creatinine trend of 2.6,   2.7, 2.6, 2.5, 2.4, 1.8 - If possible, please document in the progress notes   and discharge summary if you are evaluating and/or treating any of the   following:    The medical record reflects the following:  Risk Factors: chronic HFrEF, DM2, Ischemic cardiomyopathy  Clinical Indicators: cr trend--  2.6, 2.7, 2.6, 2.5, 2.4, 1.8---UA--post   nitrite, large LE, wbc 21-50, bact-+3, UC--> 100,000 E.coli.  3/18-neph consult--\" CHRISTINA- Etiology:  Concern for urinary retention vs. ATN due   to cystitis.\"  3/21-neph pn--\" Acute Kidney Injury-- Improving with fluids and esparza / Scr   now 2.6 -> 1.8 / prior baseline    1.3. CT did not show hydronephrosis but did have a distended bladder.\"  Treatment: IV fluids, neph consult, labs, CT, cultures, essential home meds,   supportive care    Thank you,  Shaun Jerry RN,BSB    Defined by Kidney Disease Improving Global Outcomes (KDIGO) clinical practice   guideline for acute kidney injury:  -Increase in SCr by greater than or equal to 0.3 mg/dl within 48 hours; or  -Increase or decrease in SCr to greater than or equal to 1.5 times baseline,   which is known or presumed to have occurred within the prior 7 days; or  -Urine volume < 0.5ml/kg/h for 6 hours  Options provided:  -- Acute kidney failure with acute tubular necrosis  -- Acute kidney injury as stated  -- Other - I will add my own diagnosis  -- Disagree - Not applicable / Not valid  -- Disagree - Clinically unable to determine / Unknown  -- Refer to Clinical Documentation Reviewer    PROVIDER RESPONSE TEXT:    This patient has an Acute kidney injury as

## 2025-05-02 RX ORDER — PANTOPRAZOLE SODIUM 20 MG/1
20 TABLET, DELAYED RELEASE ORAL DAILY
Qty: 90 TABLET | Refills: 0 | OUTPATIENT
Start: 2025-05-02

## 2025-05-02 NOTE — TELEPHONE ENCOUNTER
Refill Request     CONFIRM preferrred pharmacy with the patient.    If Mail Order Rx - Pend for 90 day refill.      Last Seen: Last Seen Department: 3/17/2025  Last Seen by PCP: Visit date not found    Last Written: 2/26/25    If no future appointment scheduled, route STAFF MESSAGE with patient name to the  Pool for scheduling.      Next Appointment:   No future appointments.    Message sent to  to schedule appt with patient?  NO      Requested Prescriptions     Pending Prescriptions Disp Refills    pantoprazole (PROTONIX) 20 MG tablet [Pharmacy Med Name: PANTOPRAZOLE SOD DR 20 MG TAB] 90 tablet 0     Sig: TAKE 1 TABLET BY MOUTH DAILY

## (undated) DEVICE — YANKAUER,BULB TIP,W/O VENT,RIGID,STERILE: Brand: MEDLINE

## (undated) DEVICE — CONMED SCOPE SAVER BITE BLOCK, 20X27 MM: Brand: SCOPE SAVER

## (undated) DEVICE — CANNULA,OXY,ADULT,SUPERSOFT,W/7'TUB,SC: Brand: MEDLINE INDUSTRIES, INC.

## (undated) DEVICE — ENDOSCOPIC KIT 2 12 FT OP4 DE2 GWN SYR